# Patient Record
Sex: MALE | Race: WHITE | NOT HISPANIC OR LATINO | Employment: FULL TIME | URBAN - METROPOLITAN AREA
[De-identification: names, ages, dates, MRNs, and addresses within clinical notes are randomized per-mention and may not be internally consistent; named-entity substitution may affect disease eponyms.]

---

## 2018-11-12 ENCOUNTER — EMPLOYEE HEALTH (OUTPATIENT)
Dept: OCCUPATIONAL MEDICINE | Facility: CLINIC | Age: 28
End: 2018-11-12

## 2018-11-12 ENCOUNTER — EH NON-PROVIDER (OUTPATIENT)
Dept: OCCUPATIONAL MEDICINE | Facility: CLINIC | Age: 28
End: 2018-11-12

## 2018-11-12 ENCOUNTER — HOSPITAL ENCOUNTER (OUTPATIENT)
Facility: MEDICAL CENTER | Age: 28
End: 2018-11-12
Attending: PREVENTIVE MEDICINE
Payer: COMMERCIAL

## 2018-11-12 VITALS
BODY MASS INDEX: 35.93 KG/M2 | OXYGEN SATURATION: 96 % | TEMPERATURE: 98 F | SYSTOLIC BLOOD PRESSURE: 126 MMHG | HEIGHT: 70 IN | WEIGHT: 251 LBS | HEART RATE: 103 BPM | DIASTOLIC BLOOD PRESSURE: 72 MMHG

## 2018-11-12 DIAGNOSIS — Z02.1 PRE-EMPLOYMENT HEALTH SCREENING EXAMINATION: ICD-10-CM

## 2018-11-12 DIAGNOSIS — Z02.1 PRE-EMPLOYMENT DRUG SCREENING: ICD-10-CM

## 2018-11-12 LAB
AMP AMPHETAMINE: NORMAL
BAR BARBITURATES: NORMAL
BZO BENZODIAZEPINES: NORMAL
COC COCAINE: NORMAL
INT CON NEG: NORMAL
INT CON POS: NORMAL
MDMA ECSTASY: NORMAL
MET METHAMPHETAMINES: NORMAL
MTD METHADONE: NORMAL
OPI OPIATES: NORMAL
OXY OXYCODONE: NORMAL
PCP PHENCYCLIDINE: NORMAL
POC URINE DRUG SCREEN OCDRS: NORMAL
THC: NORMAL

## 2018-11-12 PROCEDURE — 8915 PR COMPREHENSIVE PHYSICAL: Performed by: PREVENTIVE MEDICINE

## 2018-11-12 PROCEDURE — 86480 TB TEST CELL IMMUN MEASURE: CPT | Performed by: PREVENTIVE MEDICINE

## 2018-11-12 PROCEDURE — 86787 VARICELLA-ZOSTER ANTIBODY: CPT | Performed by: PREVENTIVE MEDICINE

## 2018-11-12 PROCEDURE — 90686 IIV4 VACC NO PRSV 0.5 ML IM: CPT | Performed by: PREVENTIVE MEDICINE

## 2018-11-12 PROCEDURE — 94375 RESPIRATORY FLOW VOLUME LOOP: CPT | Performed by: PREVENTIVE MEDICINE

## 2018-11-12 PROCEDURE — 80305 DRUG TEST PRSMV DIR OPT OBS: CPT | Performed by: PREVENTIVE MEDICINE

## 2018-11-12 PROCEDURE — 90746 HEPB VACCINE 3 DOSE ADULT IM: CPT | Performed by: PREVENTIVE MEDICINE

## 2018-11-13 NOTE — PROGRESS NOTES
Patient's body mass index is 36.01 kg/m². Exercise and nutrition counseling were performed at this visit.

## 2018-11-14 LAB
M TB TUBERC IFN-G BLD QL: NEGATIVE
M TB TUBERC IFN-G/MITOGEN IGNF BLD: 0.01
M TB TUBERC IGNF/MITOGEN IGNF CONTROL: 50.49 [IU]/ML
MITOGEN IGNF BCKGRD COR BLD-ACNC: 0.02 [IU]/ML
VZV IGG SER IA-ACNC: 2.72

## 2018-11-19 ENCOUNTER — EH NON-PROVIDER (OUTPATIENT)
Dept: OCCUPATIONAL MEDICINE | Facility: CLINIC | Age: 28
End: 2018-11-19

## 2018-11-19 DIAGNOSIS — Z71.85 IMMUNIZATION COUNSELING: ICD-10-CM

## 2018-11-19 NOTE — PROGRESS NOTES
Pre-employment medical surveillance reviewed and signed. Quantiferon result documented in immunizations. Document returned to monthly assigned MA.

## 2019-09-24 ENCOUNTER — IMMUNIZATION (OUTPATIENT)
Dept: OCCUPATIONAL MEDICINE | Facility: CLINIC | Age: 29
End: 2019-09-24

## 2019-09-24 DIAGNOSIS — Z23 NEED FOR VACCINATION: ICD-10-CM

## 2019-09-30 ENCOUNTER — HOSPITAL ENCOUNTER (OUTPATIENT)
Dept: LAB | Facility: MEDICAL CENTER | Age: 29
End: 2019-09-30
Payer: COMMERCIAL

## 2019-09-30 LAB
BDY FAT % MEASURED: 32.5 %
BP DIAS: 81 MMHG
BP SYS: 128 MMHG
CHOLEST SERPL-MCNC: 153 MG/DL (ref 100–199)
DIABETES HTDIA: NO
EVENT NAME HTEVT: NORMAL
FASTING HTFAS: YES
GLUCOSE SERPL-MCNC: 92 MG/DL (ref 65–99)
HDLC SERPL-MCNC: 31 MG/DL
HYPERTENSION HTHYP: NO
LDLC SERPL CALC-MCNC: 82 MG/DL
SCREENING LOC CITY HTCIT: NORMAL
SCREENING LOC STATE HTSTA: NORMAL
SCREENING LOCATION HTLOC: NORMAL
SMOKING HTSMO: NO
SUBSCRIBER ID HTSID: NORMAL
TRIGL SERPL-MCNC: 202 MG/DL (ref 0–149)

## 2019-09-30 PROCEDURE — S5190 WELLNESS ASSESSMENT BY NONPH: HCPCS

## 2019-09-30 PROCEDURE — 80061 LIPID PANEL: CPT

## 2019-09-30 PROCEDURE — 36415 COLL VENOUS BLD VENIPUNCTURE: CPT

## 2019-09-30 PROCEDURE — 82947 ASSAY GLUCOSE BLOOD QUANT: CPT

## 2019-10-04 PROCEDURE — 90686 IIV4 VACC NO PRSV 0.5 ML IM: CPT | Performed by: PREVENTIVE MEDICINE

## 2019-11-29 ENCOUNTER — TELEPHONE (OUTPATIENT)
Dept: SCHEDULING | Facility: IMAGING CENTER | Age: 29
End: 2019-11-29

## 2020-03-19 ENCOUNTER — OFFICE VISIT (OUTPATIENT)
Dept: URGENT CARE | Facility: CLINIC | Age: 30
End: 2020-03-19
Payer: COMMERCIAL

## 2020-03-19 VITALS
RESPIRATION RATE: 18 BRPM | TEMPERATURE: 98.2 F | BODY MASS INDEX: 34.36 KG/M2 | SYSTOLIC BLOOD PRESSURE: 130 MMHG | HEART RATE: 110 BPM | WEIGHT: 240 LBS | OXYGEN SATURATION: 97 % | DIASTOLIC BLOOD PRESSURE: 80 MMHG | HEIGHT: 70 IN

## 2020-03-19 DIAGNOSIS — J22 LOWER RESPIRATORY INFECTION (E.G., BRONCHITIS, PNEUMONIA, PNEUMONITIS, PULMONITIS): ICD-10-CM

## 2020-03-19 DIAGNOSIS — J01.40 ACUTE NON-RECURRENT PANSINUSITIS: ICD-10-CM

## 2020-03-19 PROCEDURE — 99204 OFFICE O/P NEW MOD 45 MIN: CPT | Performed by: INTERNAL MEDICINE

## 2020-03-19 RX ORDER — ALBUTEROL SULFATE 90 UG/1
2 AEROSOL, METERED RESPIRATORY (INHALATION) EVERY 6 HOURS PRN
Qty: 8.5 G | Refills: 0 | Status: SHIPPED | OUTPATIENT
Start: 2020-03-19 | End: 2021-03-23

## 2020-03-19 RX ORDER — DOXYCYCLINE 100 MG/1
100 TABLET ORAL 2 TIMES DAILY
Qty: 14 TAB | Refills: 0 | Status: SHIPPED | OUTPATIENT
Start: 2020-03-19 | End: 2021-03-23

## 2020-03-19 ASSESSMENT — ENCOUNTER SYMPTOMS
RHINORRHEA: 1
SHORTNESS OF BREATH: 1
COUGH: 1
FEVER: 0
MYALGIAS: 0
WHEEZING: 0

## 2020-03-20 NOTE — PROGRESS NOTES
Subjective:     Chao Pereira Jr. is a 29 y.o. male who presents for Cough (and congestion for a week.  )       Cough   This is a new problem. The current episode started in the past 7 days. The problem has been gradually worsening. The problem occurs constantly. The cough is productive of purulent sputum. Associated symptoms include nasal congestion, postnasal drip, rhinorrhea and shortness of breath. Pertinent negatives include no fever, myalgias or wheezing.   History reviewed. No pertinent past medical history.History reviewed. No pertinent surgical history.  Social History     Socioeconomic History   • Marital status: Single     Spouse name: Not on file   • Number of children: Not on file   • Years of education: Not on file   • Highest education level: Not on file   Occupational History   • Not on file   Social Needs   • Financial resource strain: Not on file   • Food insecurity     Worry: Not on file     Inability: Not on file   • Transportation needs     Medical: Not on file     Non-medical: Not on file   Tobacco Use   • Smoking status: Never Smoker   • Smokeless tobacco: Never Used   Substance and Sexual Activity   • Alcohol use: Yes   • Drug use: No   • Sexual activity: Not on file   Lifestyle   • Physical activity     Days per week: Not on file     Minutes per session: Not on file   • Stress: Not on file   Relationships   • Social connections     Talks on phone: Not on file     Gets together: Not on file     Attends [employer] service: Not on file     Active member of club or organization: Not on file     Attends meetings of clubs or organizations: Not on file     Relationship status: Not on file   • Intimate partner violence     Fear of current or ex partner: Not on file     Emotionally abused: Not on file     Physically abused: Not on file     Forced sexual activity: Not on file   Other Topics Concern   • Not on file   Social History Narrative   • Not on file    History reviewed. No pertinent  "family history. Review of Systems   Constitutional: Negative for fever.   HENT: Positive for postnasal drip and rhinorrhea.    Respiratory: Positive for cough and shortness of breath. Negative for wheezing.    Musculoskeletal: Negative for myalgias.   All other systems reviewed and are negative.  No Known Allergies   Objective:   /80   Pulse (!) 110   Temp 36.8 °C (98.2 °F)   Ht 1.778 m (5' 10\")   Wt 108.9 kg (240 lb)   SpO2 97%   BMI 34.44 kg/m²   Physical Exam  Constitutional:       General: He is not in acute distress.     Appearance: He is well-developed.   HENT:      Head: Normocephalic and atraumatic.      Right Ear: Tympanic membrane, ear canal and external ear normal.      Left Ear: Tympanic membrane, ear canal and external ear normal.      Nose: Mucosal edema and rhinorrhea present.      Right Sinus: Maxillary sinus tenderness and frontal sinus tenderness present.      Left Sinus: Maxillary sinus tenderness and frontal sinus tenderness present.      Mouth/Throat:      Mouth: Mucous membranes are moist.      Pharynx: Oropharynx is clear. Posterior oropharyngeal erythema present.   Eyes:      Conjunctiva/sclera: Conjunctivae normal.   Neck:      Musculoskeletal: No neck rigidity.   Cardiovascular:      Rate and Rhythm: Normal rate and regular rhythm.   Pulmonary:      Effort: Pulmonary effort is normal. No respiratory distress.      Breath sounds: Normal breath sounds.   Lymphadenopathy:      Cervical: No cervical adenopathy.   Skin:     General: Skin is warm and dry.      Capillary Refill: Capillary refill takes less than 2 seconds.   Neurological:      Mental Status: He is alert and oriented to person, place, and time.      Sensory: No sensory deficit.      Deep Tendon Reflexes: Reflexes are normal and symmetric.   Psychiatric:         Mood and Affect: Mood normal.         Behavior: Behavior normal.           Assessment/Plan:   Assessment    1. Lower respiratory infection (e.g., bronchitis, " pneumonia, pneumonitis, pulmonitis)  - doxycycline monohydrate (ADOXA) 100 MG tablet; Take 1 Tab by mouth 2 times a day.  Dispense: 14 Tab; Refill: 0  - albuterol 108 (90 Base) MCG/ACT Aero Soln inhalation aerosol; Inhale 2 Puffs by mouth every 6 hours as needed for Shortness of Breath.  Dispense: 8.5 g; Refill: 0    2. Acute non-recurrent pansinusitis  - doxycycline monohydrate (ADOXA) 100 MG tablet; Take 1 Tab by mouth 2 times a day.  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

## 2020-04-06 ENCOUNTER — EH NON-PROVIDER (OUTPATIENT)
Dept: OCCUPATIONAL MEDICINE | Facility: CLINIC | Age: 30
End: 2020-04-06

## 2020-04-06 DIAGNOSIS — Z01.89 RESPIRATORY CLEARANCE EXAMINATION, ENCOUNTER FOR: ICD-10-CM

## 2020-04-06 PROCEDURE — 94375 RESPIRATORY FLOW VOLUME LOOP: CPT | Performed by: FAMILY MEDICINE

## 2020-05-11 ENCOUNTER — HOSPITAL ENCOUNTER (OUTPATIENT)
Dept: LAB | Facility: MEDICAL CENTER | Age: 30
End: 2020-05-11
Attending: NURSE PRACTITIONER
Payer: COMMERCIAL

## 2020-05-11 PROCEDURE — 86787 VARICELLA-ZOSTER ANTIBODY: CPT

## 2020-05-11 PROCEDURE — 36415 COLL VENOUS BLD VENIPUNCTURE: CPT

## 2020-05-11 PROCEDURE — 86480 TB TEST CELL IMMUN MEASURE: CPT

## 2020-05-13 LAB
GAMMA INTERFERON BACKGROUND BLD IA-ACNC: 0.02 IU/ML
M TB IFN-G BLD-IMP: NEGATIVE
M TB IFN-G CD4+ BCKGRND COR BLD-ACNC: 0 IU/ML
MITOGEN IGNF BCKGRD COR BLD-ACNC: >10 IU/ML
QFT TB2 - NIL TBQ2: 0 IU/ML
VZV IGG SER IA-ACNC: 2.54

## 2020-09-22 ENCOUNTER — IMMUNIZATION (OUTPATIENT)
Dept: OCCUPATIONAL MEDICINE | Facility: CLINIC | Age: 30
End: 2020-09-22

## 2020-09-22 DIAGNOSIS — Z23 NEED FOR VACCINATION: ICD-10-CM

## 2020-09-22 PROCEDURE — 90686 IIV4 VACC NO PRSV 0.5 ML IM: CPT | Performed by: PREVENTIVE MEDICINE

## 2020-11-25 ENCOUNTER — EH NON-PROVIDER (OUTPATIENT)
Dept: OCCUPATIONAL MEDICINE | Facility: CLINIC | Age: 30
End: 2020-11-25

## 2020-11-25 ENCOUNTER — HOSPITAL ENCOUNTER (OUTPATIENT)
Facility: MEDICAL CENTER | Age: 30
End: 2020-11-25
Attending: PREVENTIVE MEDICINE
Payer: COMMERCIAL

## 2020-11-25 DIAGNOSIS — Z11.59 ENCOUNTER FOR SCREENING FOR OTHER VIRAL DISEASES: ICD-10-CM

## 2020-11-25 LAB — COVID ORDER STATUS COVID19: NORMAL

## 2020-11-25 PROCEDURE — U0003 INFECTIOUS AGENT DETECTION BY NUCLEIC ACID (DNA OR RNA); SEVERE ACUTE RESPIRATORY SYNDROME CORONAVIRUS 2 (SARS-COV-2) (CORONAVIRUS DISEASE [COVID-19]), AMPLIFIED PROBE TECHNIQUE, MAKING USE OF HIGH THROUGHPUT TECHNOLOGIES AS DESCRIBED BY CMS-2020-01-R: HCPCS | Performed by: PREVENTIVE MEDICINE

## 2020-11-25 PROCEDURE — U0003 INFECTIOUS AGENT DETECTION BY NUCLEIC ACID (DNA OR RNA); SEVERE ACUTE RESPIRATORY SYNDROME CORONAVIRUS 2 (SARS-COV-2) (CORONAVIRUS DISEASE [COVID-19]), AMPLIFIED PROBE TECHNIQUE, MAKING USE OF HIGH THROUGHPUT TECHNOLOGIES AS DESCRIBED BY CMS-2020-01-R: HCPCS

## 2020-11-26 LAB
SARS-COV-2 RNA RESP QL NAA+PROBE: NOTDETECTED
SPECIMEN SOURCE: NORMAL

## 2020-12-17 DIAGNOSIS — Z23 NEED FOR VACCINATION: ICD-10-CM

## 2021-03-23 ENCOUNTER — OFFICE VISIT (OUTPATIENT)
Dept: MEDICAL GROUP | Facility: IMAGING CENTER | Age: 31
End: 2021-03-23
Payer: COMMERCIAL

## 2021-03-23 VITALS
HEIGHT: 70 IN | OXYGEN SATURATION: 96 % | DIASTOLIC BLOOD PRESSURE: 78 MMHG | WEIGHT: 272 LBS | RESPIRATION RATE: 20 BRPM | BODY MASS INDEX: 38.94 KG/M2 | SYSTOLIC BLOOD PRESSURE: 110 MMHG | HEART RATE: 108 BPM | TEMPERATURE: 99 F

## 2021-03-23 DIAGNOSIS — Q74.2 CONGENITAL FOOT ABNORMALITY: ICD-10-CM

## 2021-03-23 DIAGNOSIS — M62.830 BACK SPASM: ICD-10-CM

## 2021-03-23 DIAGNOSIS — D22.9 MULTIPLE ATYPICAL SKIN MOLES: ICD-10-CM

## 2021-03-23 DIAGNOSIS — E78.1 HYPERTRIGLYCERIDEMIA: ICD-10-CM

## 2021-03-23 DIAGNOSIS — Z88.9 H/O SEASONAL ALLERGIES: ICD-10-CM

## 2021-03-23 DIAGNOSIS — Z76.89 ENCOUNTER TO ESTABLISH CARE: ICD-10-CM

## 2021-03-23 PROCEDURE — 99214 OFFICE O/P EST MOD 30 MIN: CPT | Performed by: PHYSICIAN ASSISTANT

## 2021-03-23 RX ORDER — MELOXICAM 15 MG/1
TABLET ORAL
COMMUNITY
Start: 2021-03-15 | End: 2021-09-13

## 2021-03-23 RX ORDER — CYCLOBENZAPRINE HCL 5 MG
5-10 TABLET ORAL 3 TIMES DAILY PRN
Qty: 20 TABLET | Refills: 0 | Status: SHIPPED | OUTPATIENT
Start: 2021-03-23 | End: 2021-09-10

## 2021-03-23 ASSESSMENT — PATIENT HEALTH QUESTIONNAIRE - PHQ9: CLINICAL INTERPRETATION OF PHQ2 SCORE: 0

## 2021-03-23 ASSESSMENT — PAIN SCALES - GENERAL: PAINLEVEL: NO PAIN

## 2021-03-23 NOTE — ASSESSMENT & PLAN NOTE
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 He had a triple arthrodesis of his left foot in 2005, pt opted out of the second surgery on his right foot. He has been seeing a podiatrist who put him on Mobic which has been helping. He works on his feet all day and now gets pain into his knees and hips which makes it hard for him to sleep. He is going to get prescription insoles.

## 2021-03-23 NOTE — PATIENT INSTRUCTIONS
Once resulted, your lab/test/imaging results will show up automatically in your MyChart. Please wait for my interpretation and recommendations prior to viewing your results to avoid any unnecessary confusion or misinterpretation. I will address all of the lab values that I interpret as abnormal and message you accordingly on your MyChart. I will always send you a message on your labs even if they are normal. If you do not hear back from me within 3 business days after getting your labs drawn, then please send me a message on MyChart so I can obtain your labs (especially if you went to an outside lab - [facility], [facility], etc). If you have any additional questions or concerns beyond my interpretation of your results, please make an appointment with me to discuss in further detail.    Thank you,    Melody Vallejo PA-C

## 2021-03-23 NOTE — PROGRESS NOTES
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 male.     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 He had a triple arthrodesis of his left foot in 2005, pt opted out of the second surgery on his right foot. He has been seeing a podiatrist who put him on Mobic which has been helping. He works on his feet all day and now gets pain into his knees and hips which makes it hard for him to sleep. He is going to get prescription insoles.      Depression Screening    Little interest or pleasure in doing things?  0 - not at all  Feeling down, depressed , or hopeless? 0 - not at all  Patient Health Questionnaire Score: 0      Allergies:   Patient has no known allergies.  Current Outpatient Medications Ordered in Epic   Medication Sig Dispense Refill   • meloxicam (MOBIC) 15 MG tablet      • cyclobenzaprine (FLEXERIL) 5 mg tablet Take 1-2 Tablets by mouth 3 times a day as needed. 20 tablet 0   • albuterol (VENTOLIN OR PROVENTIL) 108 (90 BASE) MCG/ACT AERS inhalation aerosol Inhale 2 Puffs by mouth every 6 hours as needed for Shortness of Breath. 8.5 g 3     No current [facility]-ordered facility-administered medications on file.     Past Medical History:   Diagnosis Date   • Allergy      Past Surgical History:   Procedure Laterality Date   • OTHER  2005    triple arthrodesis   • VASECTOMY       Social History     Tobacco Use   • Smoking status: Never Smoker   • Smokeless tobacco: Never Used   Substance Use Topics   • Alcohol use: Yes     Comment: occasional - 1x a month   • Drug use: No     Social History     Social History Narrative   • Not on file     Family History   Problem Relation Age of Onset   • Hypertension Father    • Cancer Neg Hx    • Diabetes Neg Hx    • Heart " "Disease Neg Hx    • Stroke Neg Hx        Health Maintenance/Screening  --Diet: keto, limited red meat  --Exercise: walks a lot at work    --STI Screening: refused  --HIV Screening: refused  --Immunizations:    Influenza: UTD   Tetanus: due 2026      ROS:   Gen: no fevers/chills, no changes in weight  Eyes: no changes in vision  ENT: no sore throat, no hearing loss, no bloody nose  Pulm: no sob, no cough  CV: no chest pain, no palpitations  GI: no nausea/vomiting, no diarrhea  : no dysuria or hematuria  MSk: no myalgias  Skin: no rash  Neuro: no headaches, no numbness/tingling  Heme/Lymph: no easy bruising      Objective:     Exam: /78 (BP Location: Right arm, Patient Position: Sitting, BP Cuff Size: Adult)   Pulse (!) 108   Temp 37.2 °C (99 °F) (Temporal)   Resp 20   Ht 1.778 m (5' 10\")   Wt 123 kg (272 lb)   SpO2 96%  Body mass index is 39.03 kg/m².    General: Normal appearing. No distress.  HEENT: Normocephalic. Eyes conjunctiva clear lids without ptosis, pupils equal  Neck: Supple without JVD. Thyroid is not enlarged.  Pulmonary: Clear to ausculation.  Normal effort. No rales, ronchi, or wheezing.  Cardiovascular: Regular rate and rhythm without murmur. Carotid and radial pulses are intact and equal bilaterally.  Abdomen: Soft, nontender, nondistended. Normal bowel sounds. Liver and spleen are not palpable  Neurologic: Grossly nonfocal  Lymph: No cervical or supraclavicular lymph nodes are palpable  Skin: Warm and dry.  No obvious lesions.  Musculoskeletal: Normal gait. No extremity cyanosis, clubbing, or edema. Outward rotation of feet B/L. Limited ROM, but able to plantar and dorsiflex.  Psych: Normal mood and affect. Alert and oriented x3. Judgment and insight is normal.    Assessment & Plan:   30 y.o. male with the following -    1. Encounter to establish care  Pleasant 30-year-old male here to establish care.  Lab orders reviewed.  Vaccinations discussed.  Diet and exercise discussed.    2. " Congenital foot abnormality  Patient with chronic outward rotation of bilateral feet with history of left foot triple arthrodesis.  Will refer to orthopedics for their evaluation.  Would recommend updated imaging of hips and knees.  Will trial Flexeril for his back spasms related to his gait.  - REFERRAL TO ORTHOPEDICS    3. Back spasm  - cyclobenzaprine (FLEXERIL) 5 mg tablet; Take 1-2 Tablets by mouth 3 times a day as needed.  Dispense: 20 tablet; Refill: 0    4. H/O seasonal allergies  - CBC WITH DIFFERENTIAL; Future  - Comp Metabolic Panel; Future    5. Hypertriglyceridemia  - CBC WITH DIFFERENTIAL; Future  - Comp Metabolic Panel; Future  - Lipid Profile; Future    6. BMI 39.0-39.9,adult  Nutritionist evaluation next visit if no change in weight.  - CBC WITH DIFFERENTIAL; Future  - Comp Metabolic Panel; Future  - Lipid Profile; Future    7. Multiple atypical skin moles  - REFERRAL TO DERMATOLOGY    Return in about 3 months (around 6/23/2021) for Follow-up.    Melody Kemp PA-C    Please note that this dictation was created using voice recognition software. I have made every reasonable attempt to correct obvious errors, but I expect that there are errors of grammar and possibly content that I did not discover before finalizing the note.

## 2021-03-25 ENCOUNTER — TELEPHONE (OUTPATIENT)
Dept: MEDICAL GROUP | Facility: IMAGING CENTER | Age: 31
End: 2021-03-25

## 2021-03-25 NOTE — TELEPHONE ENCOUNTER
----- Message from Melody Kemp P.A.-C. sent at 3/23/2021  2:31 PM PDT -----  Last EMG and records from [facility]

## 2021-03-25 NOTE — TELEPHONE ENCOUNTER
----- Message from Melody Kemp P.A.-C. sent at 3/23/2021  2:31 PM PDT -----  Records and imaging from Dr. Zhoa through [facility]

## 2021-06-25 ENCOUNTER — HOSPITAL ENCOUNTER (OUTPATIENT)
Dept: RADIOLOGY | Facility: MEDICAL CENTER | Age: 31
End: 2021-06-25
Attending: ORTHOPAEDIC SURGERY
Payer: COMMERCIAL

## 2021-06-25 DIAGNOSIS — M19.071 PRIMARY LOCALIZED OSTEOARTHROSIS OF RIGHT ANKLE AND FOOT: ICD-10-CM

## 2021-06-25 PROCEDURE — 73700 CT LOWER EXTREMITY W/O DYE: CPT | Mod: RT

## 2021-09-07 ENCOUNTER — EH NON-PROVIDER (OUTPATIENT)
Dept: OCCUPATIONAL MEDICINE | Facility: CLINIC | Age: 31
End: 2021-09-07

## 2021-09-07 ENCOUNTER — HOSPITAL ENCOUNTER (OUTPATIENT)
Facility: MEDICAL CENTER | Age: 31
End: 2021-09-07
Attending: NURSE PRACTITIONER
Payer: COMMERCIAL

## 2021-09-07 DIAGNOSIS — Z11.59 ENCOUNTER FOR SCREENING FOR OTHER VIRAL DISEASES: Primary | ICD-10-CM

## 2021-09-07 DIAGNOSIS — Z11.59 ENCOUNTER FOR SCREENING FOR OTHER VIRAL DISEASES: ICD-10-CM

## 2021-09-07 LAB
COVID ORDER STATUS COVID19: NORMAL
SARS-COV-2 RNA RESP QL NAA+PROBE: DETECTED
SPECIMEN SOURCE: ABNORMAL

## 2021-09-07 PROCEDURE — U0003 INFECTIOUS AGENT DETECTION BY NUCLEIC ACID (DNA OR RNA); SEVERE ACUTE RESPIRATORY SYNDROME CORONAVIRUS 2 (SARS-COV-2) (CORONAVIRUS DISEASE [COVID-19]), AMPLIFIED PROBE TECHNIQUE, MAKING USE OF HIGH THROUGHPUT TECHNOLOGIES AS DESCRIBED BY CMS-2020-01-R: HCPCS | Performed by: NURSE PRACTITIONER

## 2021-09-09 NOTE — PATIENT COMMUNICATION
Patient called COVID hotline back.  Notified pt. of POSITIVE COVID-19 test. Pt will be excluded from work for 10 days from their onset of symptoms. They have been instructed to isolate at home during this time.           Projected release date is 9/14, to return to work on their next scheduled shift.

## 2021-09-09 NOTE — PATIENT COMMUNICATION
Attempted to contact patient with result of POSITIVE COVID-19 test.  Non-detailed message was left without using any patient identifiers, asking for a call back at [phone].     Pt will be excluded from work for 10 days from their onset of symptoms. They should be isolating at home during this time.      Projected release date is 9/14, to return to work on their next scheduled shift.

## 2021-09-10 ENCOUNTER — TELEMEDICINE (OUTPATIENT)
Dept: MEDICAL GROUP | Facility: IMAGING CENTER | Age: 31
End: 2021-09-10
Payer: COMMERCIAL

## 2021-09-10 VITALS — BODY MASS INDEX: 37.45 KG/M2 | WEIGHT: 261 LBS

## 2021-09-10 DIAGNOSIS — U07.1 COVID-19: ICD-10-CM

## 2021-09-10 DIAGNOSIS — R05.9 COUGH: ICD-10-CM

## 2021-09-10 DIAGNOSIS — M79.10 MUSCLE ACHE: ICD-10-CM

## 2021-09-10 PROCEDURE — 99214 OFFICE O/P EST MOD 30 MIN: CPT | Mod: 95,CR,CS | Performed by: PHYSICIAN ASSISTANT

## 2021-09-10 RX ORDER — ALBUTEROL SULFATE 90 UG/1
2 AEROSOL, METERED RESPIRATORY (INHALATION) EVERY 6 HOURS PRN
Qty: 8.5 G | Refills: 1 | Status: SHIPPED
Start: 2021-09-10 | End: 2021-12-01

## 2021-09-10 RX ORDER — CYCLOBENZAPRINE HCL 10 MG
10 TABLET ORAL 2 TIMES DAILY PRN
Qty: 30 TABLET | Refills: 0 | Status: SHIPPED
Start: 2021-09-10 | End: 2021-12-03

## 2021-09-10 RX ORDER — IBUPROFEN 800 MG/1
TABLET ORAL
COMMUNITY
Start: 2021-08-25 | End: 2021-09-13

## 2021-09-10 NOTE — PATIENT INSTRUCTIONS
My recommendations for an upper respiratory infection:  - Use a humidifier, especially at night. Cold or warm water humidifiers have the same effect.  - Sarmad Med squeeze bottle sinus rinses or plain nasal saline twice a day.  - Hot tea + honey + fresh lemon juice  - Honey by itself has been shown to help provide cough relief  - Frequent hand washing, rest, hydration  - OTC meds as recommended today during your visit - Mucinex DM

## 2021-09-10 NOTE — PROGRESS NOTES
Virtual Visit: Established Patient   This visit was conducted via Zoom using secure and encrypted videoconferencing technology. The patient was in a private location in the state of [place].    The patient's identity was confirmed and verbal consent was obtained for this virtual visit.    Subjective:   CC:   Chief Complaint   Patient presents with   • Medication Refill     meloxicam never recieved. Refills on albuterol and cyclobenzaprine   • Covid-19 Home Monitoring Video Visit       Chao Pereira Jr. is a 30 y.o. male presenting for evaluation and management of:    COVID-19  Pt states that around one week ago he noticed an elevated heart rate. He started to develop no appetite, no taste or smell. He feels tired and achy. No significant dizziness. Mild cough and chest congestion. He still notices that his heart rate increases to the 140s when he walks. Improves with rest.    Has been using Mucinex D bid x 7 days. Taking Tylenol and ibuprofen as needed. Flexeril helping with the body aches, but he is out. Taking Zinc, vitamin d, vitamin b. Pt was not vaccinated.    Oxygen saturation mostly above 90% when he checks it at home.       ROS   Denies any recent fevers or chills. No nausea or vomiting. No chest pains or shortness of breath.     No Known Allergies    Current medicines (including changes today)  Current Outpatient Medications   Medication Sig Dispense Refill   • albuterol 108 (90 Base) MCG/ACT Aero Soln inhalation aerosol Inhale 2 Puffs every 6 hours as needed for Shortness of Breath. 8.5 g 1   • cyclobenzaprine (FLEXERIL) 10 mg Tab Take 1 Tablet by mouth 2 times a day as needed for Muscle Spasms. 30 Tablet 0   • ibuprofen (MOTRIN) 800 MG Tab      • meloxicam (MOBIC) 15 MG tablet  (Patient not taking: Reported on 9/10/2021)       No current facility-administered medications for this visit.       Patient Active Problem List    Diagnosis Date Noted   • COVID-19 09/10/2021   • H/O seasonal allergies  03/23/2021   • Hypertriglyceridemia 03/23/2021   • BMI 39.0-39.9,adult 03/23/2021   • Congenital foot abnormality 03/23/2021       Family History   Problem Relation Age of Onset   • Hypertension Father    • Cancer Neg Hx    • Diabetes Neg Hx    • Heart Disease Neg Hx    • Stroke Neg Hx        He  has a past medical history of Allergy.  He  has a past surgical history that includes other (2005) and vasectomy.         Objective:   Wt 118 kg (261 lb)   BMI 37.45 kg/m²   RR 12    Physical Exam:  Constitutional: Alert, no distress, well-groomed.  Skin: No rashes in visible areas.  Eye: Round. Conjunctiva clear, lids normal. No icterus.   ENMT: Lips pink without lesions, good dentition, moist mucous membranes. Phonation normal.  Neck: No masses, no thyromegaly. Moves freely without pain.  Respiratory: Unlabored respiratory effort, no cough or audible wheeze  Psych: Alert and oriented x3, normal affect and mood.     Assessment and Plan:   The following treatment plan was discussed:     1. COVID-19  Day 7. Change Mucinex D to Mucinex DM as it may contributing to his tachycardia. Albuterol 1-2 puffs every 4-6 hours as needed.  Encouraged Covid vaccination once feeling better.  Increase fluids and rest.  Antihistamines and Flonase for nasal symptoms.  Frequent handwashing.  Covid long-haul symptoms discussed. Cardiac work up if tachycardia persists.     2. Cough  See above  - albuterol 108 (90 Base) MCG/ACT Aero Soln inhalation aerosol; Inhale 2 Puffs every 6 hours as needed for Shortness of Breath.  Dispense: 8.5 g; Refill: 1    3. Muscle ache  Refill Flexeril.  - cyclobenzaprine (FLEXERIL) 10 mg Tab; Take 1 Tablet by mouth 2 times a day as needed for Muscle Spasms.  Dispense: 30 Tablet; Refill: 0    Follow-up: Return in about 4 weeks (around 10/8/2021) for Follow-up - pre-op respiratory check.         Meoldy Zuniga PA-C ([place])  Physician Assistant Certified  [facility]    Please note that this dictation was  created using voice recognition software. I have made every reasonable attempt to correct obvious errors, but I expect that there are errors of grammar and possibly content that I did not discover before finalizing the note.

## 2021-09-10 NOTE — ASSESSMENT & PLAN NOTE
Pt states that around one week ago he noticed an elevated heart rate. He started to develop no appetite, no taste or smell. He feels tired and achy. No significant dizziness. Mild cough and chest congestion. He still notices that his heart rate increases to the 140s when he walks. Improves with rest.    Has been using Mucinex D bid x 7 days. Taking Tylenol and ibuprofen as needed. Flexeril helping with the body aches, but he is out. Taking Zinc, vitamin d, vitamin b. Pt was not vaccinated.    Oxygen saturation mostly above 90% when he checks it at home.

## 2021-09-13 ENCOUNTER — HOSPITAL ENCOUNTER (INPATIENT)
Facility: MEDICAL CENTER | Age: 31
LOS: 7 days | DRG: 871 | End: 2021-09-20
Attending: EMERGENCY MEDICINE | Admitting: STUDENT IN AN ORGANIZED HEALTH CARE EDUCATION/TRAINING PROGRAM
Payer: COMMERCIAL

## 2021-09-13 ENCOUNTER — APPOINTMENT (OUTPATIENT)
Dept: RADIOLOGY | Facility: MEDICAL CENTER | Age: 31
DRG: 871 | End: 2021-09-13
Attending: EMERGENCY MEDICINE
Payer: COMMERCIAL

## 2021-09-13 DIAGNOSIS — J96.00 ACUTE RESPIRATORY FAILURE DUE TO COVID-19 (HCC): ICD-10-CM

## 2021-09-13 DIAGNOSIS — J96.01 ACUTE HYPOXEMIC RESPIRATORY FAILURE DUE TO COVID-19 (HCC): ICD-10-CM

## 2021-09-13 DIAGNOSIS — R74.01 TRANSAMINITIS: ICD-10-CM

## 2021-09-13 DIAGNOSIS — U07.1 ACUTE HYPOXEMIC RESPIRATORY FAILURE DUE TO COVID-19 (HCC): ICD-10-CM

## 2021-09-13 DIAGNOSIS — U07.1 ACUTE RESPIRATORY FAILURE DUE TO COVID-19 (HCC): ICD-10-CM

## 2021-09-13 PROBLEM — R00.0 TACHYCARDIA WITH HEART RATE 141-160 BEATS PER MINUTE: Status: ACTIVE | Noted: 2021-09-13

## 2021-09-13 LAB
ALBUMIN SERPL BCP-MCNC: 4.2 G/DL (ref 3.2–4.9)
ALBUMIN/GLOB SERPL: 1.1 G/DL
ALP SERPL-CCNC: 126 U/L (ref 30–99)
ALT SERPL-CCNC: 112 U/L (ref 2–50)
ANION GAP SERPL CALC-SCNC: 19 MMOL/L (ref 7–16)
APPEARANCE UR: CLEAR
AST SERPL-CCNC: 127 U/L (ref 12–45)
BACTERIA #/AREA URNS HPF: ABNORMAL /HPF
BASOPHILS # BLD AUTO: 0.1 % (ref 0–1.8)
BASOPHILS # BLD: 0.01 K/UL (ref 0–0.12)
BILIRUB SERPL-MCNC: 0.7 MG/DL (ref 0.1–1.5)
BILIRUB UR QL STRIP.AUTO: NEGATIVE
BUN SERPL-MCNC: 21 MG/DL (ref 8–22)
CALCIUM SERPL-MCNC: 8.9 MG/DL (ref 8.5–10.5)
CHLORIDE SERPL-SCNC: 94 MMOL/L (ref 96–112)
CO2 SERPL-SCNC: 21 MMOL/L (ref 20–33)
COLOR UR: ABNORMAL
CREAT SERPL-MCNC: 1.13 MG/DL (ref 0.5–1.4)
CRP SERPL HS-MCNC: 8.46 MG/DL (ref 0–0.75)
D DIMER PPP IA.FEU-MCNC: 0.75 UG/ML (FEU) (ref 0–0.5)
EKG IMPRESSION: NORMAL
EOSINOPHIL # BLD AUTO: 0 K/UL (ref 0–0.51)
EOSINOPHIL NFR BLD: 0 % (ref 0–6.9)
EPI CELLS #/AREA URNS HPF: ABNORMAL /HPF
ERYTHROCYTE [DISTWIDTH] IN BLOOD BY AUTOMATED COUNT: 41.6 FL (ref 35.9–50)
EST. AVERAGE GLUCOSE BLD GHB EST-MCNC: 140 MG/DL
GLOBULIN SER CALC-MCNC: 3.8 G/DL (ref 1.9–3.5)
GLUCOSE SERPL-MCNC: 120 MG/DL (ref 65–99)
GLUCOSE UR STRIP.AUTO-MCNC: NEGATIVE MG/DL
HBA1C MFR BLD: 6.5 % (ref 4–5.6)
HCT VFR BLD AUTO: 55.7 % (ref 42–52)
HGB BLD-MCNC: 18.6 G/DL (ref 14–18)
HYALINE CASTS #/AREA URNS LPF: ABNORMAL /LPF
IMM GRANULOCYTES # BLD AUTO: 0.05 K/UL (ref 0–0.11)
IMM GRANULOCYTES NFR BLD AUTO: 0.7 % (ref 0–0.9)
KETONES UR STRIP.AUTO-MCNC: ABNORMAL MG/DL
LACTATE BLD-SCNC: 2.6 MMOL/L (ref 0.5–2)
LACTATE BLD-SCNC: 2.8 MMOL/L (ref 0.5–2)
LACTATE BLD-SCNC: 3.2 MMOL/L (ref 0.5–2)
LEUKOCYTE ESTERASE UR QL STRIP.AUTO: NEGATIVE
LYMPHOCYTES # BLD AUTO: 0.59 K/UL (ref 1–4.8)
LYMPHOCYTES NFR BLD: 8.7 % (ref 22–41)
MAGNESIUM SERPL-MCNC: 2.2 MG/DL (ref 1.5–2.5)
MCH RBC QN AUTO: 25.8 PG (ref 27–33)
MCHC RBC AUTO-ENTMCNC: 33.4 G/DL (ref 33.7–35.3)
MCV RBC AUTO: 77.4 FL (ref 81.4–97.8)
MICRO URNS: ABNORMAL
MONOCYTES # BLD AUTO: 0.24 K/UL (ref 0–0.85)
MONOCYTES NFR BLD AUTO: 3.5 % (ref 0–13.4)
MUCOUS THREADS #/AREA URNS HPF: ABNORMAL /HPF
NEUTROPHILS # BLD AUTO: 5.9 K/UL (ref 1.82–7.42)
NEUTROPHILS NFR BLD: 87 % (ref 44–72)
NITRITE UR QL STRIP.AUTO: NEGATIVE
NRBC # BLD AUTO: 0 K/UL
NRBC BLD-RTO: 0 /100 WBC
PH UR STRIP.AUTO: 5.5 [PH] (ref 5–8)
PLATELET # BLD AUTO: 280 K/UL (ref 164–446)
PMV BLD AUTO: 10.5 FL (ref 9–12.9)
POTASSIUM SERPL-SCNC: 3.7 MMOL/L (ref 3.6–5.5)
PROCALCITONIN SERPL-MCNC: 0.53 NG/ML
PROT SERPL-MCNC: 8 G/DL (ref 6–8.2)
PROT UR QL STRIP: 100 MG/DL
RBC # BLD AUTO: 7.2 M/UL (ref 4.7–6.1)
RBC UR QL AUTO: NEGATIVE
SODIUM SERPL-SCNC: 134 MMOL/L (ref 135–145)
SP GR UR STRIP.AUTO: 1.03
UROBILINOGEN UR STRIP.AUTO-MCNC: 1 MG/DL
WBC # BLD AUTO: 6.8 K/UL (ref 4.8–10.8)
WBC #/AREA URNS HPF: ABNORMAL /HPF

## 2021-09-13 PROCEDURE — 81001 URINALYSIS AUTO W/SCOPE: CPT

## 2021-09-13 PROCEDURE — 700102 HCHG RX REV CODE 250 W/ 637 OVERRIDE(OP): Performed by: STUDENT IN AN ORGANIZED HEALTH CARE EDUCATION/TRAINING PROGRAM

## 2021-09-13 PROCEDURE — 83036 HEMOGLOBIN GLYCOSYLATED A1C: CPT

## 2021-09-13 PROCEDURE — 99285 EMERGENCY DEPT VISIT HI MDM: CPT

## 2021-09-13 PROCEDURE — 71045 X-RAY EXAM CHEST 1 VIEW: CPT

## 2021-09-13 PROCEDURE — A9270 NON-COVERED ITEM OR SERVICE: HCPCS | Performed by: STUDENT IN AN ORGANIZED HEALTH CARE EDUCATION/TRAINING PROGRAM

## 2021-09-13 PROCEDURE — 84145 PROCALCITONIN (PCT): CPT

## 2021-09-13 PROCEDURE — 96372 THER/PROPH/DIAG INJ SC/IM: CPT

## 2021-09-13 PROCEDURE — 96375 TX/PRO/DX INJ NEW DRUG ADDON: CPT

## 2021-09-13 PROCEDURE — 93005 ELECTROCARDIOGRAM TRACING: CPT

## 2021-09-13 PROCEDURE — 85025 COMPLETE CBC W/AUTO DIFF WBC: CPT

## 2021-09-13 PROCEDURE — 86140 C-REACTIVE PROTEIN: CPT

## 2021-09-13 PROCEDURE — 87040 BLOOD CULTURE FOR BACTERIA: CPT

## 2021-09-13 PROCEDURE — 770020 HCHG ROOM/CARE - TELE (206)

## 2021-09-13 PROCEDURE — 85379 FIBRIN DEGRADATION QUANT: CPT

## 2021-09-13 PROCEDURE — 80053 COMPREHEN METABOLIC PANEL: CPT

## 2021-09-13 PROCEDURE — 96374 THER/PROPH/DIAG INJ IV PUSH: CPT

## 2021-09-13 PROCEDURE — 83605 ASSAY OF LACTIC ACID: CPT

## 2021-09-13 PROCEDURE — 87086 URINE CULTURE/COLONY COUNT: CPT

## 2021-09-13 PROCEDURE — 83735 ASSAY OF MAGNESIUM: CPT

## 2021-09-13 PROCEDURE — 93005 ELECTROCARDIOGRAM TRACING: CPT | Performed by: EMERGENCY MEDICINE

## 2021-09-13 PROCEDURE — 700111 HCHG RX REV CODE 636 W/ 250 OVERRIDE (IP): Performed by: STUDENT IN AN ORGANIZED HEALTH CARE EDUCATION/TRAINING PROGRAM

## 2021-09-13 PROCEDURE — 700101 HCHG RX REV CODE 250: Performed by: STUDENT IN AN ORGANIZED HEALTH CARE EDUCATION/TRAINING PROGRAM

## 2021-09-13 PROCEDURE — 99223 1ST HOSP IP/OBS HIGH 75: CPT | Performed by: STUDENT IN AN ORGANIZED HEALTH CARE EDUCATION/TRAINING PROGRAM

## 2021-09-13 RX ORDER — CLONIDINE HYDROCHLORIDE 0.1 MG/1
0.1 TABLET ORAL EVERY 6 HOURS PRN
Status: DISCONTINUED | OUTPATIENT
Start: 2021-09-13 | End: 2021-09-20 | Stop reason: HOSPADM

## 2021-09-13 RX ORDER — ONDANSETRON 2 MG/ML
4 INJECTION INTRAMUSCULAR; INTRAVENOUS EVERY 4 HOURS PRN
Status: DISCONTINUED | OUTPATIENT
Start: 2021-09-13 | End: 2021-09-13

## 2021-09-13 RX ORDER — GUAIFENESIN/DEXTROMETHORPHAN 100-10MG/5
10 SYRUP ORAL EVERY 6 HOURS PRN
Status: DISCONTINUED | OUTPATIENT
Start: 2021-09-13 | End: 2021-09-20 | Stop reason: HOSPADM

## 2021-09-13 RX ORDER — GUAIFENESIN 600 MG/1
600 TABLET, EXTENDED RELEASE ORAL 2 TIMES DAILY PRN
Status: DISCONTINUED | OUTPATIENT
Start: 2021-09-13 | End: 2021-09-13

## 2021-09-13 RX ORDER — DEXAMETHASONE 4 MG/1
6 TABLET ORAL DAILY
Status: DISCONTINUED | OUTPATIENT
Start: 2021-09-13 | End: 2021-09-20 | Stop reason: HOSPADM

## 2021-09-13 RX ORDER — ACETAMINOPHEN 325 MG/1
650 TABLET ORAL EVERY 6 HOURS PRN
Status: DISCONTINUED | OUTPATIENT
Start: 2021-09-13 | End: 2021-09-20 | Stop reason: HOSPADM

## 2021-09-13 RX ORDER — B-COMPLEX WITH VITAMIN C
1 TABLET ORAL DAILY
COMMUNITY
End: 2021-10-18

## 2021-09-13 RX ORDER — VITAMIN B COMPLEX
1000 TABLET ORAL DAILY
COMMUNITY
End: 2021-12-01

## 2021-09-13 RX ORDER — BISACODYL 10 MG
10 SUPPOSITORY, RECTAL RECTAL
Status: DISCONTINUED | OUTPATIENT
Start: 2021-09-13 | End: 2021-09-20 | Stop reason: HOSPADM

## 2021-09-13 RX ORDER — ENALAPRILAT 1.25 MG/ML
1.25 INJECTION INTRAVENOUS EVERY 6 HOURS PRN
Status: DISCONTINUED | OUTPATIENT
Start: 2021-09-13 | End: 2021-09-20 | Stop reason: HOSPADM

## 2021-09-13 RX ORDER — PROCHLORPERAZINE EDISYLATE 5 MG/ML
5-10 INJECTION INTRAMUSCULAR; INTRAVENOUS EVERY 4 HOURS PRN
Status: DISCONTINUED | OUTPATIENT
Start: 2021-09-13 | End: 2021-09-20 | Stop reason: HOSPADM

## 2021-09-13 RX ORDER — ONDANSETRON 4 MG/1
4 TABLET, ORALLY DISINTEGRATING ORAL EVERY 6 HOURS PRN
Status: DISCONTINUED | OUTPATIENT
Start: 2021-09-13 | End: 2021-09-20 | Stop reason: HOSPADM

## 2021-09-13 RX ORDER — ONDANSETRON 4 MG/1
4 TABLET, ORALLY DISINTEGRATING ORAL EVERY 4 HOURS PRN
Status: DISCONTINUED | OUTPATIENT
Start: 2021-09-13 | End: 2021-09-13

## 2021-09-13 RX ORDER — LABETALOL HYDROCHLORIDE 5 MG/ML
10 INJECTION, SOLUTION INTRAVENOUS EVERY 4 HOURS PRN
Status: DISCONTINUED | OUTPATIENT
Start: 2021-09-13 | End: 2021-09-20 | Stop reason: HOSPADM

## 2021-09-13 RX ORDER — LORAZEPAM 2 MG/ML
2 INJECTION INTRAMUSCULAR ONCE
Status: COMPLETED | OUTPATIENT
Start: 2021-09-13 | End: 2021-09-13

## 2021-09-13 RX ORDER — METOPROLOL TARTRATE 1 MG/ML
5 INJECTION, SOLUTION INTRAVENOUS
Status: COMPLETED | OUTPATIENT
Start: 2021-09-13 | End: 2021-09-14

## 2021-09-13 RX ORDER — POLYETHYLENE GLYCOL 3350 17 G/17G
1 POWDER, FOR SOLUTION ORAL
Status: DISCONTINUED | OUTPATIENT
Start: 2021-09-13 | End: 2021-09-20 | Stop reason: HOSPADM

## 2021-09-13 RX ORDER — PROMETHAZINE HYDROCHLORIDE 25 MG/1
12.5-25 SUPPOSITORY RECTAL EVERY 4 HOURS PRN
Status: DISCONTINUED | OUTPATIENT
Start: 2021-09-13 | End: 2021-09-20 | Stop reason: HOSPADM

## 2021-09-13 RX ORDER — ONDANSETRON 2 MG/ML
4 INJECTION INTRAMUSCULAR; INTRAVENOUS EVERY 6 HOURS PRN
Status: DISCONTINUED | OUTPATIENT
Start: 2021-09-13 | End: 2021-09-20 | Stop reason: HOSPADM

## 2021-09-13 RX ORDER — AMOXICILLIN 250 MG
2 CAPSULE ORAL 2 TIMES DAILY
Status: DISCONTINUED | OUTPATIENT
Start: 2021-09-13 | End: 2021-09-20 | Stop reason: HOSPADM

## 2021-09-13 RX ORDER — GINSENG 100 MG
50 CAPSULE ORAL DAILY
COMMUNITY
End: 2021-12-01

## 2021-09-13 RX ORDER — LORAZEPAM 2 MG/ML
2 INJECTION INTRAMUSCULAR EVERY 4 HOURS PRN
Status: DISCONTINUED | OUTPATIENT
Start: 2021-09-13 | End: 2021-09-20 | Stop reason: HOSPADM

## 2021-09-13 RX ORDER — PROMETHAZINE HYDROCHLORIDE 25 MG/1
12.5-25 TABLET ORAL EVERY 4 HOURS PRN
Status: DISCONTINUED | OUTPATIENT
Start: 2021-09-13 | End: 2021-09-20 | Stop reason: HOSPADM

## 2021-09-13 RX ADMIN — METOPROLOL TARTRATE 5 MG: 1 INJECTION, SOLUTION INTRAVENOUS at 20:35

## 2021-09-13 RX ADMIN — LORAZEPAM 2 MG: 2 INJECTION INTRAMUSCULAR; INTRAVENOUS at 18:31

## 2021-09-13 RX ADMIN — ENOXAPARIN SODIUM 40 MG: 40 INJECTION SUBCUTANEOUS at 18:31

## 2021-09-13 RX ADMIN — METOPROLOL TARTRATE 5 MG: 1 INJECTION, SOLUTION INTRAVENOUS at 19:05

## 2021-09-13 RX ADMIN — ACETAMINOPHEN 650 MG: 325 TABLET, FILM COATED ORAL at 20:36

## 2021-09-13 RX ADMIN — DEXAMETHASONE 6 MG: 4 TABLET ORAL at 18:31

## 2021-09-13 ASSESSMENT — LIFESTYLE VARIABLES: DO YOU DRINK ALCOHOL: NO

## 2021-09-13 ASSESSMENT — ENCOUNTER SYMPTOMS
MYALGIAS: 1
SHORTNESS OF BREATH: 1
DIARRHEA: 1

## 2021-09-13 NOTE — ED PROVIDER NOTES
ED Provider Note    CHIEF COMPLAINT  Chief Complaint   Patient presents with   • Shortness of Breath     diagnosed with COVID on 9/7/21, symptoms started on 9/3/21. Patient reports he's had fever, chills and diarrhea.        HPI  Chao Pereira Jr. is a 30 y.o. male who presents for evaluation of progressive shortness of breath after being diagnosed with COVID-19.  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  He states that this is the 10th day of symptoms, the first week or so he felt okay but since then has had progressive shortness of breath and tachycardia at home which he treated with his wife's metoprolol.  He reports fever and chills and diarrhea.  Chest tightness as well.  Denies any other medical problems.     REVIEW OF SYSTEMS  Negative for rash, abdominal pain, focal weakness, focal numbness, focal tingling, back pain. All other systems are negative.     PAST MEDICAL HISTORY  Past Medical History:   Diagnosis Date   • Allergy        FAMILY HISTORY  Family History   Problem Relation Age of Onset   • Hypertension Father    • Cancer Neg Hx    • Diabetes Neg Hx    • Heart Disease Neg Hx    • Stroke Neg Hx        SOCIAL HISTORY  Social History     Tobacco Use   • Smoking status: Never Smoker   • Smokeless tobacco: Never Used   Vaping Use   • Vaping Use: Never used   Substance Use Topics   • Alcohol use: Yes     Comment: occasional - 1x a month   • Drug use: No       SURGICAL HISTORY  Past Surgical History:   Procedure Laterality Date   • OTHER 2005    triple arthrodesis   • VASECTOMY         CURRENT MEDICATIONS  I personally reviewed the medication list in the charting documentation.     ALLERGIES  No Known Allergies    MEDICAL RECORD  I have reviewed patient's medical record and pertinent results are listed above.      PHYSICAL EXAM  VITAL SIGNS: /79   Pulse (!) 151   Temp 37.2 °C (98.9 °F) (Temporal)   Resp 20    "Ht 1.778 m (5' 10\")   Wt 118 kg (261 lb)   SpO2 91%   BMI 37.45 kg/m²    Constitutional: Acutely ill-appearing, 2-3 word sentences, oxygen facemask  HENT: Normocephalic, no obvious evidence of acute trauma.  Eyes: No scleral icterus. Normal conjunctiva   Neck: Comfortable movement without any obvious restriction in the range of motion.  Cardiovascular: Upon ascultation I appreciate a regular heart rhythm and a tachycardic rate.   Thorax & Lungs: Normal nonlabored respirations.  Upon application of the stethoscope for auscultation I find there to be no associated chest wall tenderness.  I appreciate no wheezing, rhonchi or rales. There is normal air movement.    Abdomen: The abdomen is not visibly distended. Upon palpation, I find it to be without tenderness.  No mass appreciated.  Skin: The exposed portions of skin reveal no obvious rash or other abnormalities.  Extremities/Musculoskeletal: No obvious sign of acute trauma. No asymmetric calf tenderness or edema.   Neurologic: Alert & oriented. No focal deficits observed.   Psychiatric: Normal affect appropriate for the clinical situation.    DIAGNOSTIC STUDIES / PROCEDURES    LABS/EKGs  Results for orders placed or performed during the hospital encounter of 09/13/21   Lactic acid (lactate)   Result Value Ref Range    Lactic Acid 3.2 (H) 0.5 - 2.0 mmol/L   Lactic acid (lactate): Repeat if initial lactic acid result is greater than 2   Result Value Ref Range    Lactic Acid 2.6 (H) 0.5 - 2.0 mmol/L   Lactic acid (lactate): Repeat if initial lactic acid result is greater than 2   Result Value Ref Range    Lactic Acid 2.8 (H) 0.5 - 2.0 mmol/L   CBC WITH DIFFERENTIAL   Result Value Ref Range    WBC 6.8 4.8 - 10.8 K/uL    RBC 7.20 (H) 4.70 - 6.10 M/uL    Hemoglobin 18.6 (H) 14.0 - 18.0 g/dL    Hematocrit 55.7 (H) 42.0 - 52.0 %    MCV 77.4 (L) 81.4 - 97.8 fL    MCH 25.8 (L) 27.0 - 33.0 pg    MCHC 33.4 (L) 33.7 - 35.3 g/dL    RDW 41.6 35.9 - 50.0 fL    Platelet Count 280 " 164 - 446 K/uL    MPV 10.5 9.0 - 12.9 fL    Neutrophils-Polys 87.00 (H) 44.00 - 72.00 %    Lymphocytes 8.70 (L) 22.00 - 41.00 %    Monocytes 3.50 0.00 - 13.40 %    Eosinophils 0.00 0.00 - 6.90 %    Basophils 0.10 0.00 - 1.80 %    Immature Granulocytes 0.70 0.00 - 0.90 %    Nucleated RBC 0.00 /100 WBC    Neutrophils (Absolute) 5.90 1.82 - 7.42 K/uL    Lymphs (Absolute) 0.59 (L) 1.00 - 4.80 K/uL    Monos (Absolute) 0.24 0.00 - 0.85 K/uL    Eos (Absolute) 0.00 0.00 - 0.51 K/uL    Baso (Absolute) 0.01 0.00 - 0.12 K/uL    Immature Granulocytes (abs) 0.05 0.00 - 0.11 K/uL    NRBC (Absolute) 0.00 K/uL   COMP METABOLIC PANEL   Result Value Ref Range    Sodium 134 (L) 135 - 145 mmol/L    Potassium 3.7 3.6 - 5.5 mmol/L    Chloride 94 (L) 96 - 112 mmol/L    Co2 21 20 - 33 mmol/L    Anion Gap 19.0 (H) 7.0 - 16.0    Glucose 120 (H) 65 - 99 mg/dL    Bun 21 8 - 22 mg/dL    Creatinine 1.13 0.50 - 1.40 mg/dL    Calcium 8.9 8.5 - 10.5 mg/dL    AST(SGOT) 127 (H) 12 - 45 U/L    ALT(SGPT) 112 (H) 2 - 50 U/L    Alkaline Phosphatase 126 (H) 30 - 99 U/L    Total Bilirubin 0.7 0.1 - 1.5 mg/dL    Albumin 4.2 3.2 - 4.9 g/dL    Total Protein 8.0 6.0 - 8.2 g/dL    Globulin 3.8 (H) 1.9 - 3.5 g/dL    A-G Ratio 1.1 g/dL   URINALYSIS    Specimen: Urine   Result Value Ref Range    Color DK Yellow     Character Clear     Specific Gravity 1.030 <1.035    Ph 5.5 5.0 - 8.0    Glucose Negative Negative mg/dL    Ketones Trace (A) Negative mg/dL    Protein 100 (A) Negative mg/dL    Bilirubin Negative Negative    Urobilinogen, Urine 1.0 Negative    Nitrite Negative Negative    Leukocyte Esterase Negative Negative    Occult Blood Negative Negative    Micro Urine Req Microscopic    ESTIMATED GFR   Result Value Ref Range    GFR If African American >60 >60 mL/min/1.73 m 2    GFR If Non African American >60 >60 mL/min/1.73 m 2   URINE MICROSCOPIC (W/UA)   Result Value Ref Range    WBC 5-10 (A) /hpf    Bacteria Few (A) None /hpf    Epithelial Cells Few /hpf     Mucous Threads Moderate /hpf    Hyaline Cast 3-5 (A) /lpf   EKG (NOW)   Result Value Ref Range    Report       [facility] Emergency Dept.    Test Date:  2021  Pt Name:    DIANE CHIANG                Department: ER  MRN:        [id number]                      Room:  Gender:     Male                         Technician: [id number]  :        1990                   Requested By:ER TRIAGE PROTOCOL  Order #:    [id number]                    Reading MD: CAROL QUIROGA MD    Measurements  Intervals                                Axis  Rate:       141                          P:          49  HI:         132                          QRS:        7  QRSD:       68                           T:          28  QT:         262  QTc:        402    Interpretive Statements  12 Lead EKG interpreted by me to show: -- Rate 141 -- Rhythm: Sinus  tachycardia-- Axis: Normal -- HI and QRS Intervals: Normal -- T waves: No  acute  changes -- ST segments: No acute changes -- Ectopy: None. My impression of  this  EKG: Does not indicate acute ischemia at this time.  Electronically Signed On 20 17:40:31 PDT by CAROL QUIROGA MD          RADIOLOGY  DX-CHEST-PORTABLE (1 VIEW)   Final Result      Multifocal bilateral pneumonia, consistent with Covid 19            COURSE & MEDICAL DECISION MAKING  I have reviewed any medical record information, laboratory studies and radiographic results as noted above.  Differential diagnoses includes: COVID-19, dehydration, electrolyte abnormalities, anemia, pneumothorax, pneumonia    Encounter Summary: This is a very pleasant 30 y.o. male who unfortunately required evaluation in the emergency department today with acute respiratory failure secondary to COVID-19.  Unvaccinated.  At the time of examination he was requiring 10 L of nasal cannula oxygen to maintain oxygen saturation above 90%.  He is ill-appearing.  Will not administer antibiotics despite the markers of sepsis as  this is a viral process.  The patient will require hospitalization for further evaluation and care, the meantime he will be watched quite closely here in the emergency department till he is brought upstairs.    CRITICAL CARE  The very real possibilty of a deterioration of this patient's condition required the highest level of my preparedness for sudden, emergent intervention.  I provided critical care services, which included medication orders, frequent reevaluations of the patient's condition and response to treatment, ordering and reviewing test results, and discussing the case with various consultants.  The critical care time associated with the care of the patient was 39 minutes. Review chart for interventions. This time is exclusive of any other billable procedures.         DISPOSITION: Admit in guarded condition      FINAL IMPRESSION  1. Acute hypoxemic respiratory failure due to COVID-19 (HCC)           This dictation was created using voice recognition software. The accuracy of the dictation is limited to the abilities of the software. I expect there may be some errors of grammar and possibly content. The nursing notes were reviewed and certain aspects of this information were incorporated into this note.    Electronically signed by: Alok Bernal M.D., 9/13/2021 3:43 PM       intact

## 2021-09-13 NOTE — ED TRIAGE NOTES
Chief Complaint   Patient presents with   • Shortness of Breath     diagnosed with COVID on 9/7/21, symptoms started on 9/3/21. Patient reports he's had fever, chills and diarrhea.          Patient ambulated to [facility] for above complaint. Patient A+Ox4. Patient 78% on RA and currently requiring 4 L of oxygen. Patient SOB and fatigue.     Pt ambulated to triage for above complaint.  Pt is AO x 4, follows commands, and responds appropriately to questions. Patient's breathing is mild and pain is currently 5/10 on the 0-10 pain scale.  Pt placed in senior lounge. Patient educated on triage process and encouraged to alert staff for any changes.

## 2021-09-14 LAB
ALBUMIN SERPL BCP-MCNC: 3.6 G/DL (ref 3.2–4.9)
ALBUMIN/GLOB SERPL: 1.2 G/DL
ALP SERPL-CCNC: 109 U/L (ref 30–99)
ALT SERPL-CCNC: 86 U/L (ref 2–50)
ANION GAP SERPL CALC-SCNC: 19 MMOL/L (ref 7–16)
AST SERPL-CCNC: 104 U/L (ref 12–45)
BASOPHILS # BLD AUTO: 0.2 % (ref 0–1.8)
BASOPHILS # BLD: 0.01 K/UL (ref 0–0.12)
BILIRUB SERPL-MCNC: 0.6 MG/DL (ref 0.1–1.5)
BUN SERPL-MCNC: 19 MG/DL (ref 8–22)
CALCIUM SERPL-MCNC: 8.3 MG/DL (ref 8.5–10.5)
CHLORIDE SERPL-SCNC: 93 MMOL/L (ref 96–112)
CHOLEST SERPL-MCNC: 62 MG/DL (ref 100–199)
CO2 SERPL-SCNC: 19 MMOL/L (ref 20–33)
CREAT SERPL-MCNC: 0.77 MG/DL (ref 0.5–1.4)
EOSINOPHIL # BLD AUTO: 0 K/UL (ref 0–0.51)
EOSINOPHIL NFR BLD: 0 % (ref 0–6.9)
ERYTHROCYTE [DISTWIDTH] IN BLOOD BY AUTOMATED COUNT: 40.5 FL (ref 35.9–50)
GLOBULIN SER CALC-MCNC: 3 G/DL (ref 1.9–3.5)
GLUCOSE SERPL-MCNC: 132 MG/DL (ref 65–99)
HAV IGM SERPL QL IA: NORMAL
HBV CORE IGM SER QL: NORMAL
HBV SURFACE AG SER QL: NORMAL
HCT VFR BLD AUTO: 48.2 % (ref 42–52)
HCV AB SER QL: NORMAL
HDLC SERPL-MCNC: 17 MG/DL
HGB BLD-MCNC: 16.1 G/DL (ref 14–18)
IMM GRANULOCYTES # BLD AUTO: 0.05 K/UL (ref 0–0.11)
IMM GRANULOCYTES NFR BLD AUTO: 0.9 % (ref 0–0.9)
LDLC SERPL CALC-MCNC: 26 MG/DL
LYMPHOCYTES # BLD AUTO: 0.64 K/UL (ref 1–4.8)
LYMPHOCYTES NFR BLD: 11.7 % (ref 22–41)
MCH RBC QN AUTO: 25.5 PG (ref 27–33)
MCHC RBC AUTO-ENTMCNC: 33.4 G/DL (ref 33.7–35.3)
MCV RBC AUTO: 76.3 FL (ref 81.4–97.8)
MONOCYTES # BLD AUTO: 0.21 K/UL (ref 0–0.85)
MONOCYTES NFR BLD AUTO: 3.8 % (ref 0–13.4)
NEUTROPHILS # BLD AUTO: 4.55 K/UL (ref 1.82–7.42)
NEUTROPHILS NFR BLD: 83.4 % (ref 44–72)
NRBC # BLD AUTO: 0 K/UL
NRBC BLD-RTO: 0 /100 WBC
PLATELET # BLD AUTO: 259 K/UL (ref 164–446)
PMV BLD AUTO: 11.1 FL (ref 9–12.9)
POTASSIUM SERPL-SCNC: 3.7 MMOL/L (ref 3.6–5.5)
PROT SERPL-MCNC: 6.6 G/DL (ref 6–8.2)
RBC # BLD AUTO: 6.32 M/UL (ref 4.7–6.1)
SODIUM SERPL-SCNC: 131 MMOL/L (ref 135–145)
TRIGL SERPL-MCNC: 95 MG/DL (ref 0–149)
WBC # BLD AUTO: 5.5 K/UL (ref 4.8–10.8)

## 2021-09-14 PROCEDURE — 36415 COLL VENOUS BLD VENIPUNCTURE: CPT

## 2021-09-14 PROCEDURE — 700101 HCHG RX REV CODE 250: Performed by: INTERNAL MEDICINE

## 2021-09-14 PROCEDURE — 85025 COMPLETE CBC W/AUTO DIFF WBC: CPT

## 2021-09-14 PROCEDURE — 700101 HCHG RX REV CODE 250: Performed by: STUDENT IN AN ORGANIZED HEALTH CARE EDUCATION/TRAINING PROGRAM

## 2021-09-14 PROCEDURE — 770020 HCHG ROOM/CARE - TELE (206)

## 2021-09-14 PROCEDURE — 700111 HCHG RX REV CODE 636 W/ 250 OVERRIDE (IP): Performed by: STUDENT IN AN ORGANIZED HEALTH CARE EDUCATION/TRAINING PROGRAM

## 2021-09-14 PROCEDURE — 97535 SELF CARE MNGMENT TRAINING: CPT

## 2021-09-14 PROCEDURE — 94640 AIRWAY INHALATION TREATMENT: CPT

## 2021-09-14 PROCEDURE — A9270 NON-COVERED ITEM OR SERVICE: HCPCS | Performed by: INTERNAL MEDICINE

## 2021-09-14 PROCEDURE — 86480 TB TEST CELL IMMUN MEASURE: CPT

## 2021-09-14 PROCEDURE — 700105 HCHG RX REV CODE 258: Performed by: INTERNAL MEDICINE

## 2021-09-14 PROCEDURE — 700102 HCHG RX REV CODE 250 W/ 637 OVERRIDE(OP): Performed by: INTERNAL MEDICINE

## 2021-09-14 PROCEDURE — 94760 N-INVAS EAR/PLS OXIMETRY 1: CPT

## 2021-09-14 PROCEDURE — 99291 CRITICAL CARE FIRST HOUR: CPT | Performed by: INTERNAL MEDICINE

## 2021-09-14 PROCEDURE — 80053 COMPREHEN METABOLIC PANEL: CPT

## 2021-09-14 PROCEDURE — 80074 ACUTE HEPATITIS PANEL: CPT

## 2021-09-14 PROCEDURE — XW033E5 INTRODUCTION OF REMDESIVIR ANTI-INFECTIVE INTO PERIPHERAL VEIN, PERCUTANEOUS APPROACH, NEW TECHNOLOGY GROUP 5: ICD-10-PCS | Performed by: INTERNAL MEDICINE

## 2021-09-14 PROCEDURE — 700111 HCHG RX REV CODE 636 W/ 250 OVERRIDE (IP): Performed by: INTERNAL MEDICINE

## 2021-09-14 PROCEDURE — 80061 LIPID PANEL: CPT

## 2021-09-14 RX ADMIN — CEFTRIAXONE SODIUM 2 G: 2 INJECTION, POWDER, FOR SOLUTION INTRAMUSCULAR; INTRAVENOUS at 09:43

## 2021-09-14 RX ADMIN — LORAZEPAM 2 MG: 2 INJECTION INTRAMUSCULAR; INTRAVENOUS at 18:07

## 2021-09-14 RX ADMIN — ENOXAPARIN SODIUM 40 MG: 40 INJECTION SUBCUTANEOUS at 18:00

## 2021-09-14 RX ADMIN — METOPROLOL TARTRATE 5 MG: 1 INJECTION, SOLUTION INTRAVENOUS at 09:43

## 2021-09-14 RX ADMIN — REMDESIVIR 200 MG: 100 INJECTION, POWDER, LYOPHILIZED, FOR SOLUTION INTRAVENOUS at 00:22

## 2021-09-14 RX ADMIN — LORAZEPAM 2 MG: 2 INJECTION INTRAMUSCULAR; INTRAVENOUS at 00:22

## 2021-09-14 RX ADMIN — BARICITINIB 4 MG: 2 TABLET, FILM COATED ORAL at 13:46

## 2021-09-14 RX ADMIN — LORAZEPAM 2 MG: 2 INJECTION INTRAMUSCULAR; INTRAVENOUS at 22:23

## 2021-09-14 RX ADMIN — LORAZEPAM 2 MG: 2 INJECTION INTRAMUSCULAR; INTRAVENOUS at 09:43

## 2021-09-14 ASSESSMENT — COPD QUESTIONNAIRES
DO YOU EVER COUGH UP ANY MUCUS OR PHLEGM?: NO/ONLY WITH OCCASIONAL COLDS OR INFECTIONS
HAVE YOU SMOKED AT LEAST 100 CIGARETTES IN YOUR ENTIRE LIFE: NO/DON'T KNOW
COPD SCREENING SCORE: 0
DURING THE PAST 4 WEEKS HOW MUCH DID YOU FEEL SHORT OF BREATH: NONE/LITTLE OF THE TIME

## 2021-09-14 ASSESSMENT — ENCOUNTER SYMPTOMS
COUGH: 1
DEPRESSION: 0
NERVOUS/ANXIOUS: 1
CHILLS: 0
SHORTNESS OF BREATH: 1
MYALGIAS: 0
NAUSEA: 0
DIZZINESS: 0
ABDOMINAL PAIN: 0
VOMITING: 0
FEVER: 0
HEADACHES: 0

## 2021-09-14 ASSESSMENT — COGNITIVE AND FUNCTIONAL STATUS - GENERAL
STANDING UP FROM CHAIR USING ARMS: A LITTLE
MOVING FROM LYING ON BACK TO SITTING ON SIDE OF FLAT BED: A LITTLE
WALKING IN HOSPITAL ROOM: A LITTLE
CLIMB 3 TO 5 STEPS WITH RAILING: A LOT
MOBILITY SCORE: 19
SUGGESTED CMS G CODE MODIFIER MOBILITY: CK

## 2021-09-14 ASSESSMENT — ACTIVITIES OF DAILY LIVING (ADL): TOILETING: INDEPENDENT

## 2021-09-14 ASSESSMENT — PAIN DESCRIPTION - PAIN TYPE: TYPE: ACUTE PAIN

## 2021-09-14 ASSESSMENT — GAIT ASSESSMENTS: GAIT LEVEL OF ASSIST: UNABLE TO PARTICIPATE

## 2021-09-14 NOTE — H&P
Hospital Medicine History & Physical Note    Date of Service  9/13/2021    Primary Care Physician  Melody Zuniga P.A.-C.    Consultants      Code Status  Full Code    Chief Complaint  Chief Complaint   Patient presents with   • Shortness of Breath     diagnosed with COVID on 9/7/21, symptoms started on 9/3/21. Patient reports he's had fever, chills and diarrhea.        History of Presenting Illness  Chao Pereira Jr. is a 30 y.o. male with no past medical hx who presented 9/13/2021 for evaluation of progressive shortness of breath after being diagnosed with COVID-19.  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  He states that this is the 10th day of symptoms, he has been taking his wife's metoprolol at home for his tachycardia and had noted chest tightness and myalgias/malaise.      Notable findings include: , LA 2.6, Na 123, AST//112, AG 19, glucose 120, RR 29    Chest xray showing: Multifocal bilateral pneumonia, consistent with Covid 19     RT is consulted on admission due to patient likely needing high-flow, stat IV metoprolol PRN ordered and patient is admitted to telemetry.     I discussed the plan of care with patient.    Review of Systems  Review of Systems   Constitutional: Positive for malaise/fatigue.   Respiratory: Positive for shortness of breath.    Gastrointestinal: Positive for diarrhea.   Musculoskeletal: Positive for myalgias.   All other systems reviewed and are negative.      Past Medical History   has a past medical history of Allergy. reviewed.    Surgical History   has a past surgical history that includes other (2005) and vasectomy.  Reviewed.    Family History  family history includes Hypertension in his father.   Family history reviewed with patient. There is no family history that is pertinent to the chief complaint.     Social History   reports that he has never smoked. He has never used smokeless  tobacco. He reports current alcohol use. He reports that he does not use drugs. reviewed.    Allergies  No Known Allergies    Medications  Prior to Admission Medications   Prescriptions Last Dose Informant Patient Reported? Taking?   albuterol 108 (90 Base) MCG/ACT Aero Soln inhalation aerosol   No No   Sig: Inhale 2 Puffs every 6 hours as needed for Shortness of Breath.   cyclobenzaprine (FLEXERIL) 10 mg Tab   No No   Sig: Take 1 Tablet by mouth 2 times a day as needed for Muscle Spasms.   ibuprofen (MOTRIN) 800 MG Tab   Yes No   meloxicam (MOBIC) 15 MG tablet   Yes No   Patient not taking: Reported on 9/10/2021      Facility-Administered Medications: None       Physical Exam  Temp:  [37.2 °C (98.9 °F)] 37.2 °C (98.9 °F)  Pulse:  [140-151] 149  Resp:  [20-29] 26  BP: (119-139)/(73-82) 125/82  SpO2:  [89 %-93 %] 92 %  Blood Pressure: 119/76   Temperature: 37.2 °C (98.9 °F)   Pulse: (!) 143   Respiration: (!) 22   Pulse Oximetry: 93 %       Physical Exam     Constitutional: acute respiratory distress requiring 10L   HENT: Normocephalic, no obvious evidence of acute trauma.  Eyes: No scleral icterus. Normal conjunctiva   Neck: Comfortable movement without any obvious restriction in the range of motion.  Cardiovascular: tachycardia  Thorax & Lungs: acute respiratory distress. No wheezing, rales or rhonchi heard on ausculation.  there is no obvious chest wall tenderness. Shallow breaths with limited excursion b/l   Abdomen: The abdomen is not visibly distended. Upon palpation, I find it to be without tenderness.  No mass appreciated.  Skin: The exposed portions of skin reveal no obvious rash or other abnormalities.  Extremities/Musculoskeletal: no lower extremity edema with no asymmetry.  Neurologic: Alert & oriented. No focal deficits observed.   Psychiatric: appears anxious    Laboratory:  Recent Labs     09/13/21  1444   WBC 6.8   RBC 7.20*   HEMOGLOBIN 18.6*   HEMATOCRIT 55.7*   MCV 77.4*   MCH 25.8*   MCHC 33.4*    RDW 41.6   PLATELETCT 280   MPV 10.5     Recent Labs     09/13/21  1444   SODIUM 134*   POTASSIUM 3.7   CHLORIDE 94*   CO2 21   GLUCOSE 120*   BUN 21   CREATININE 1.13   CALCIUM 8.9     Recent Labs     09/13/21  1444   ALTSGPT 112*   ASTSGOT 127*   ALKPHOSPHAT 126*   TBILIRUBIN 0.7   GLUCOSE 120*         No results for input(s): NTPROBNP in the last 72 hours.      No results for input(s): TROPONINT in the last 72 hours.    Imaging:  DX-CHEST-PORTABLE (1 VIEW)   Final Result      Multifocal bilateral pneumonia, consistent with Covid 19              Assessment/Plan:  I anticipate this patient will require at least two midnights for appropriate medical management, necessitating inpatient admission.    Acute respiratory failure due to COVID-19 (HCC)  Assessment & Plan  - Conservative use of IVF  - Duonebs, O2 therapy, and proning as needed  -  Lasix as needed to remove excess fluid.  - Decadron 6mg qd  - Holding abx, f/u w/ Procal  - f/u Blood Cultures, Sputum Cx  - Check D-dimer, CRP, Lactic Acid  - Lovenox for VTE ppx  - Consider ID consult for Remdesivir//Tocolizumab    Tachycardia with heart rate 141-160 beats per minute- (present on admission)  Assessment & Plan  IV Metoprolol prn  Admitted with telemetry  Continue to monitor    BMI 39.0-39.9,adult- (present on admission)  Assessment & Plan  Patient's body mass index is 37.45 kg/m². Exercise and nutrition counseling advised prior to discharge    Hypertriglyceridemia- (present on admission)  Assessment & Plan  Lipid panel ordered to assess  Takes no home meds currently      VTE prophylaxis: enoxaparin ppx

## 2021-09-14 NOTE — PROGRESS NOTES
Brief ID note:    -Baricitinib approval requested by Dr. Granda  -Chart reviewed.  Patient with progressive COVID-19 pneumonia, now on HFNC  -Patient already on dexamethasone, prophylactic Lovenox  -Baricitinib approved per [facility] protocol.  Will initiate a 14-day course of 4 mg p.o. daily  -Please provide the patient EUA fact sheet to patient or caregiver, available on Inside [facility]  -Check baseline QuantiFERON gold, hepatitis B surface antigen, surface antibody, core antibody  -Check CBC with differential daily, CMP daily  -Discontinue baricitinib if:  • eGFR < 15 mL/min/1.73m2  • Hgb < 8 g/dL  • Significant immunosuppression: ALC < 200/uL, ANC < 1000/uL  • VTE in last 12 weeks or recurrent DVT/PE  • AST > 225 or ALT > 250 u/L    • Active serious infection other than COVID-19  -Okay to discontinue if cleared for discharge

## 2021-09-14 NOTE — CARE PLAN
The patient is Stable - Low risk of patient condition declining or worsening    Shift Goals  Clinical Goals: maintain oxygenation and HR below 120  Patient Goals: rest and oxygenation  Family Goals: ORTEGA    Progress made toward(s) clinical / shift goals:  Continuous O2 saturation monitoring in place. Oxygenation maintaining above 90%.    Patient is not progressing towards the following goals:

## 2021-09-14 NOTE — PROGRESS NOTES
Report received from JONO Nuñez. Patient arrived to unit via acls RN on non rebreather 15L. RT to bedside to place patient on high flow. O2 saturations 90-93%. Pt A&Ox4. No complaints of pain, just tired. Oriented to room. Instructed to utilize call light for assistance due to increased O2 demand, pt verbalized understanding. Pt wife updated via telephone.

## 2021-09-14 NOTE — ED NOTES
Med rec updated and complete. Allergies reviewed. Pt denies  Antibiotic use in lat 30 days.       home pharmacy [facility]  [phone]    • Zinc 50 MG Tab Take 50 mg by mouth every day.     • vitamin D3 (CHOLECALCIFEROL) 1000 Unit (25 mcg) Tab Take 1,000 Units by mouth every day.     • B Complex Vitamins (VITAMIN B COMPLEX) Tab Take 1 Tablet by mouth every day.     • albuterol 108 (90 Base) MCG/ACT Aero Soln inhalation aerosol Inhale 2 Puffs every 6 hours as needed for Shortness of Breath. 8.5 g 1   • cyclobenzaprine (FLEXERIL) 10 mg Tab Take 1 Tablet by mouth 2 times a day as needed for Muscle Spasms. 30 Tablet 0

## 2021-09-14 NOTE — PROGRESS NOTES
Pulmonary/Critical Care Medicine   Brief Consult Note    Date of service: 9/13/2021  Time: 11:19 PM    Asked to evaluate this patient for treatment options (Remdesivir), he is a previously health 31 yo unvaccinated male with COVID-19 pneumonia. CrCl 121 mL/min, , . Day 10 of symptoms, he is on 12 L of O2.    Patient is already on dexamethasone, prophylactic Lovenox  Remdesivir approved. Will initiate a 5-day course of IV remdesivir  Monitor daily CMP. Discontinue remdesivir if ALT >500 or if creatinine clearance <30  Continue management per hospitalist teams. Please call us back if formal consult requested    KAL Ortega Jr.OFani  [facility] Critical [facility]

## 2021-09-14 NOTE — THERAPY
Physical Therapy Note      Attempted to see patient for PT evaluation.  Patient able to perform supine <> sit with HOB elevated with SPV however upon sitting EOB patient began coughing & O2 sats dropped as low as 81% (on 15L oxymask).  Patient slowly recovered to mid 80s but again dropped to 81% with further coughing.  Further evaluation deferred due to inability to maintain O2 sats.  Patient with limited talking in order to maintain O2 sats.  Will re-attempt formal PT eval when patient's respiratory status is more stable.       09/14/21 0563   Prior Living Situation   Prior Services Home-Independent   Housing / Facility 1 Story House   Steps Into Home 1   Steps In Home 0   Equipment Owned None   Lives with - Patient's Self Care Capacity Spouse;Child Less than 18 Years of Age (Wife & 3 kids)   Prior Level of Functional Mobility   Bed Mobility Independent   Transfer Status Independent   Ambulation Independent   Distance Ambulation (Feet) (community)   Assistive Devices Used None   Stairs Independent   Cognition    Cognition / Consciousness WDL   Level of Consciousness Alert   Comments Pleasant & cooperative, patient with limited talking due to SOB and O2 desat   Balance Assessment   Sitting Balance (Static) Fair +   Sitting Balance (Dynamic) Fair   Weight Shift Sitting Fair   Comments Standing deferred due to desats in sitting   Gait Analysis   Gait Level Of Assist Unable to Participate   Comments Desats with sitting EOB, further activity deferred   Bed Mobility    Supine to Sit Supervised   Sit to Supine Supervised   Scooting Supervised   Comments HOB elevated   Functional Mobility   Sit to Stand Unable to Participate   Bed, Chair, Wheelchair Transfer Unable to Participate   Comments Deferred due to desats with sitting EOB   Activity Tolerance   Sitting in Chair NT   Sitting Edge of Bed 5 min   Standing NT   Comments Limited by O2 sats, SOB   Session Information   Date / Session Number  9/14 - educ only (Needs  EVAL)

## 2021-09-14 NOTE — PROGRESS NOTES
Pt to EOB with therapy this morning. O2 sats 80% on 15L oxymask after returning to rest for several minutes. Nonrebreather placed on patient. O2 sats 85% after nonrebreather initiation, HR 120s. PRN ativan and metoprolol given, educated to take deep breaths. O2 sats persistent between 83-88% on non-rebreather. MD and charge RN aware, patient to transfer to T8 for high flow oxygen.

## 2021-09-14 NOTE — PROGRESS NOTES
4 Eyes Skin Assessment Completed by JONO Sweet and JONO Gonzalez.    Head WDL  Ears WDL  Nose WDL  Mouth WDL  Neck WDL  Breast/Chest WDL  Shoulder Blades WDL  Spine WDL  (R) Arm/Elbow/Hand WDL  (L) Arm/Elbow/Hand WDL  Abdomen WDL  Groin WDL  Scrotum/Coccyx/Buttocks WDL  (R) Leg WDL  (L) Leg WDL  (R) Heel/Foot/Toe WDL  (L) Heel/Foot/Toe WDL          Devices In Places Tele Box, Pulse Ox and Oxy Mask      Interventions In Place Pillows    Possible Skin Injury No    Pictures Uploaded Into Epic N/A  Wound Consult Placed N/A  RN Wound Prevention Protocol Ordered No

## 2021-09-14 NOTE — ASSESSMENT & PLAN NOTE
Patient's body mass index is 37.45 kg/m². Exercise and nutrition counseling advised prior to discharge

## 2021-09-14 NOTE — CARE PLAN
45L 100%  Problem: Humidified High Flow Nasal Cannula  Goal: Maintain adequate oxygenation dependent on patient condition  Description: 1.  Implement humidified high flow oxygen therapy  2.  Titrate high flow oxygen to maintain appropriate SpO2  Outcome: Progressing

## 2021-09-14 NOTE — ASSESSMENT & PLAN NOTE
Patient with COVID-19 pneumonia and worsening respiratory status now with acute respiratory failure  - Conservative use of IVF  - Duonebs, O2 therapy, and proning as tolerated  - Decadron 6mg qd  - Holding abx, procal only mildly elevated, spoke with pharmacy likely covid related, continue to monitor procal will keep off abx for now  - f/u Blood Cultures, Sputum Cx  - Lovenox for VTE ppx  -Patient did receive 1 dose of remdesivir however has been transitioned to high flow nasal cannula thus this was discontinued  - he was started on baricitinib by ID, discontinued 9/18 due to elevated liver enzymes  -Supportive care, wean oxygen as able  - improving, now off HFNC

## 2021-09-14 NOTE — PROGRESS NOTES
Hospital Medicine Daily Progress Note    Date of Service  9/14/2021    Chief Complaint  Chao Pereira Jr. is a 30 y.o. male admitted 9/13/2021 with shortness of breath and covid.     Hospital Course  30 y.o. male with no past medical hx who presented 9/13/2021 for evaluation of progressive shortness of breath after being diagnosed with COVID-19.  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 Chest x-ray showed multifocal bilateral pneumonia. Patient septic from Covid infection. Started on Decadron, critical care was consulted for remdesivir which has been approved and started.    Interval Problem Update  Patient started on remdesivir last night. Patient on 15 L oxygen mask this morning. He continues to be tachycardic and tachypneic. D-dimer mildly elevated but less than 3.     Later this morning patient satting low 80s on max NRB, upgraded patient to telemetry for HFNC, satting 92% on my evaluation. Remdesivir discontinued, and ID consulted for barcitinib.     I have personally seen and examined the patient at bedside. I discussed the plan of care with patient and bedside RN.    Consultants/Specialty  critical care and infectious disease    Code Status  Full Code    Disposition  Patient is not medically cleared.   Anticipate discharge to D.  I have placed the appropriate orders for post-discharge needs.    Review of Systems  Review of Systems   Constitutional: Positive for malaise/fatigue. Negative for chills and fever.   Respiratory: Positive for cough and shortness of breath.    Cardiovascular: Negative for chest pain.   Gastrointestinal: Negative for abdominal pain, nausea and vomiting.   Genitourinary: Negative for dysuria.   Musculoskeletal: Negative for myalgias.   Skin: Negative for rash.   Neurological: Negative for dizziness and headaches.   Psychiatric/Behavioral: Negative for depression. The patient is nervous/anxious.    All other  systems reviewed and are negative.       Physical Exam  Temp:  [36.1 °C (97 °F)-38 °C (100.4 °F)] 36.1 °C (97 °F)  Pulse:  [101-151] 101  Resp:  [18-32] 18  BP: (115-139)/(69-94) 127/73  SpO2:  [85 %-94 %] 90 %    Physical Exam  Vitals and nursing note reviewed.   Constitutional:       General: He is in acute distress.      Appearance: He is obese. He is ill-appearing.   HENT:      Head: Normocephalic and atraumatic.   Eyes:      Conjunctiva/sclera: Conjunctivae normal.   Cardiovascular:      Rate and Rhythm: Regular rhythm. Tachycardia present.      Pulses: Normal pulses.   Pulmonary:      Effort: Respiratory distress present.      Breath sounds: Rhonchi present. No wheezing.      Comments: On HFNC   Abdominal:      General: Abdomen is flat. There is no distension.      Palpations: Abdomen is soft.      Tenderness: There is no abdominal tenderness.   Musculoskeletal:         General: Normal range of motion.      Cervical back: Normal range of motion and neck supple.      Right lower leg: No edema.      Left lower leg: No edema.   Skin:     General: Skin is warm and dry.      Findings: No rash.   Neurological:      General: No focal deficit present.      Mental Status: He is alert and oriented to person, place, and time.      Cranial Nerves: No cranial nerve deficit.   Psychiatric:         Mood and Affect: Mood normal.         Behavior: Behavior normal.         Fluids  No intake or output data in the 24 hours ending 09/14/21 1124    Laboratory  Recent Labs     09/13/21  1444 09/14/21  0234   WBC 6.8 5.5   RBC 7.20* 6.32*   HEMOGLOBIN 18.6* 16.1   HEMATOCRIT 55.7* 48.2   MCV 77.4* 76.3*   MCH 25.8* 25.5*   MCHC 33.4* 33.4*   RDW 41.6 40.5   PLATELETCT 280 259   MPV 10.5 11.1     Recent Labs     09/13/21  1444 09/14/21  0234   SODIUM 134* 131*   POTASSIUM 3.7 3.7   CHLORIDE 94* 93*   CO2 21 19*   GLUCOSE 120* 132*   BUN 21 19   CREATININE 1.13 0.77   CALCIUM 8.9 8.3*             Recent Labs     09/14/21  0234    TRIGLYCERIDE 95   HDL 17*   LDL 26       Imaging  DX-CHEST-PORTABLE (1 VIEW)   Final Result      Multifocal bilateral pneumonia, consistent with Covid 19           Assessment/Plan  Acute respiratory failure due to COVID-19 (HCC)  Assessment & Plan  - Conservative use of IVF  - Duonebs, O2 therapy, and proning as needed  - Decadron 6mg qd  - Holding abx, procal only mildly elevated, spoke with pharmacy likely covid related, continue to monitor procal will keep off abx for now  - f/u Blood Cultures, Sputum Cx  - Lovenox for VTE ppx  - Crit Care consulted for remdesivir, given 1x dose, dc as now on HFNC  - ID consulted for Barcitinib    Tachycardia with heart rate 141-160 beats per minute- (present on admission)  Assessment & Plan  Continue covid treatment  Admitted with telemetry  Continue to monitor    BMI 39.0-39.9,adult- (present on admission)  Assessment & Plan  Patient's body mass index is 37.45 kg/m². Exercise and nutrition counseling advised prior to discharge    Hypertriglyceridemia- (present on admission)  Assessment & Plan  Lipid panel, triglycerides wnl  Takes no home meds currently    resolved       VTE prophylaxis: SCDs/TEDs and enoxaparin ppx    Patient is critically ill.   The patient continues to have: worsening hypoxic respiratory failure.  The vital organ system that is affected is the: Lungs  If untreated there is a high chance of deterioration into and eventually death.  The critical care that I am providing today is: Close monitoring of vital signs including oxygenation, pt on high flow oxygen device, and discussing care with consultants.  The critical care that has been undertaken is medically complex.  There has been no overlap in critical care time.  Critical Care Time not including procedures: 40 mins    I have performed a physical exam and reviewed and updated ROS and Plan today (9/14/2021). In review of yesterday's note (9/13/2021), there are no changes except as documented above.

## 2021-09-14 NOTE — ED NOTES
Repeat lactic completed.  Pt evaluated by RT, pt not requiring high flow NC at this time.    Pt medicated per MAR.

## 2021-09-14 NOTE — THERAPY
Occupational Therapy  Daily Treatment     Patient Name: Chao Pereira Jr.  Age:  30 y.o., Sex:  male  Medical Record #: [id number]  Today's Date: 9/14/2021               Unable to eval pt 2/2 low O2 sats, will completed eval when pt mieddiclly stable.       09/14/21 0824   Activity Tolerance   Comments limited by O2 sats.  pt dropped into the low 80s on 15L/pm.  pt O2 sats remained in the mid 80s after returning to bed.   RN notified

## 2021-09-15 LAB
ALBUMIN SERPL BCP-MCNC: 3.2 G/DL (ref 3.2–4.9)
ALBUMIN/GLOB SERPL: 1 G/DL
ALP SERPL-CCNC: 94 U/L (ref 30–99)
ALT SERPL-CCNC: 73 U/L (ref 2–50)
ANION GAP SERPL CALC-SCNC: 13 MMOL/L (ref 7–16)
AST SERPL-CCNC: 72 U/L (ref 12–45)
BACTERIA UR CULT: NORMAL
BASOPHILS # BLD AUTO: 0 % (ref 0–1.8)
BASOPHILS # BLD: 0 K/UL (ref 0–0.12)
BILIRUB SERPL-MCNC: 0.4 MG/DL (ref 0.1–1.5)
BUN SERPL-MCNC: 24 MG/DL (ref 8–22)
BURR CELLS BLD QL SMEAR: NORMAL
CALCIUM SERPL-MCNC: 8.4 MG/DL (ref 8.5–10.5)
CHLORIDE SERPL-SCNC: 99 MMOL/L (ref 96–112)
CO2 SERPL-SCNC: 21 MMOL/L (ref 20–33)
CREAT SERPL-MCNC: 0.82 MG/DL (ref 0.5–1.4)
EOSINOPHIL # BLD AUTO: 0 K/UL (ref 0–0.51)
EOSINOPHIL NFR BLD: 0 % (ref 0–6.9)
ERYTHROCYTE [DISTWIDTH] IN BLOOD BY AUTOMATED COUNT: 40.8 FL (ref 35.9–50)
GAMMA INTERFERON BACKGROUND BLD IA-ACNC: 0.04 IU/ML
GLOBULIN SER CALC-MCNC: 3.2 G/DL (ref 1.9–3.5)
GLUCOSE SERPL-MCNC: 122 MG/DL (ref 65–99)
HCT VFR BLD AUTO: 47.1 % (ref 42–52)
HGB BLD-MCNC: 15.4 G/DL (ref 14–18)
LYMPHOCYTES # BLD AUTO: 1.15 K/UL (ref 1–4.8)
LYMPHOCYTES NFR BLD: 19.1 % (ref 22–41)
M TB IFN-G BLD-IMP: ABNORMAL
M TB IFN-G CD4+ BCKGRND COR BLD-ACNC: 0 IU/ML
MANUAL DIFF BLD: NORMAL
MCH RBC QN AUTO: 25 PG (ref 27–33)
MCHC RBC AUTO-ENTMCNC: 32.7 G/DL (ref 33.7–35.3)
MCV RBC AUTO: 76.6 FL (ref 81.4–97.8)
METAMYELOCYTES NFR BLD MANUAL: 0.9 %
MITOGEN IGNF BCKGRD COR BLD-ACNC: 0.28 IU/ML
MONOCYTES # BLD AUTO: 0.37 K/UL (ref 0–0.85)
MONOCYTES NFR BLD AUTO: 6.1 % (ref 0–13.4)
MORPHOLOGY BLD-IMP: NORMAL
NEUTROPHILS # BLD AUTO: 4.43 K/UL (ref 1.82–7.42)
NEUTROPHILS NFR BLD: 73.9 % (ref 44–72)
NRBC # BLD AUTO: 0 K/UL
NRBC BLD-RTO: 0 /100 WBC
PLATELET # BLD AUTO: 311 K/UL (ref 164–446)
PLATELET BLD QL SMEAR: NORMAL
PMV BLD AUTO: 10.8 FL (ref 9–12.9)
POIKILOCYTOSIS BLD QL SMEAR: NORMAL
POTASSIUM SERPL-SCNC: 3.6 MMOL/L (ref 3.6–5.5)
PROCALCITONIN SERPL-MCNC: 0.8 NG/ML
PROT SERPL-MCNC: 6.4 G/DL (ref 6–8.2)
QFT TB2 - NIL TBQ2: 0 IU/ML
RBC # BLD AUTO: 6.15 M/UL (ref 4.7–6.1)
RBC BLD AUTO: PRESENT
SIGNIFICANT IND 70042: NORMAL
SITE SITE: NORMAL
SODIUM SERPL-SCNC: 133 MMOL/L (ref 135–145)
SOURCE SOURCE: NORMAL
WBC # BLD AUTO: 6 K/UL (ref 4.8–10.8)

## 2021-09-15 PROCEDURE — 84145 PROCALCITONIN (PCT): CPT

## 2021-09-15 PROCEDURE — 770020 HCHG ROOM/CARE - TELE (206)

## 2021-09-15 PROCEDURE — 80053 COMPREHEN METABOLIC PANEL: CPT

## 2021-09-15 PROCEDURE — 36415 COLL VENOUS BLD VENIPUNCTURE: CPT

## 2021-09-15 PROCEDURE — 700102 HCHG RX REV CODE 250 W/ 637 OVERRIDE(OP): Performed by: INTERNAL MEDICINE

## 2021-09-15 PROCEDURE — 94760 N-INVAS EAR/PLS OXIMETRY 1: CPT

## 2021-09-15 PROCEDURE — 94640 AIRWAY INHALATION TREATMENT: CPT

## 2021-09-15 PROCEDURE — 85027 COMPLETE CBC AUTOMATED: CPT

## 2021-09-15 PROCEDURE — 700102 HCHG RX REV CODE 250 W/ 637 OVERRIDE(OP): Performed by: STUDENT IN AN ORGANIZED HEALTH CARE EDUCATION/TRAINING PROGRAM

## 2021-09-15 PROCEDURE — 700111 HCHG RX REV CODE 636 W/ 250 OVERRIDE (IP): Performed by: STUDENT IN AN ORGANIZED HEALTH CARE EDUCATION/TRAINING PROGRAM

## 2021-09-15 PROCEDURE — 99291 CRITICAL CARE FIRST HOUR: CPT | Performed by: STUDENT IN AN ORGANIZED HEALTH CARE EDUCATION/TRAINING PROGRAM

## 2021-09-15 PROCEDURE — 99291 CRITICAL CARE FIRST HOUR: CPT | Performed by: INTERNAL MEDICINE

## 2021-09-15 PROCEDURE — 85007 BL SMEAR W/DIFF WBC COUNT: CPT

## 2021-09-15 PROCEDURE — A9270 NON-COVERED ITEM OR SERVICE: HCPCS | Performed by: STUDENT IN AN ORGANIZED HEALTH CARE EDUCATION/TRAINING PROGRAM

## 2021-09-15 PROCEDURE — A9270 NON-COVERED ITEM OR SERVICE: HCPCS | Performed by: INTERNAL MEDICINE

## 2021-09-15 RX ADMIN — DEXAMETHASONE 6 MG: 4 TABLET ORAL at 05:59

## 2021-09-15 RX ADMIN — LORAZEPAM 2 MG: 2 INJECTION INTRAMUSCULAR; INTRAVENOUS at 08:48

## 2021-09-15 RX ADMIN — ENOXAPARIN SODIUM 40 MG: 40 INJECTION SUBCUTANEOUS at 17:34

## 2021-09-15 RX ADMIN — LORAZEPAM 2 MG: 2 INJECTION INTRAMUSCULAR; INTRAVENOUS at 21:04

## 2021-09-15 RX ADMIN — BARICITINIB 4 MG: 2 TABLET, FILM COATED ORAL at 17:34

## 2021-09-15 RX ADMIN — ACETAMINOPHEN 650 MG: 325 TABLET, FILM COATED ORAL at 09:54

## 2021-09-15 RX ADMIN — ACETAMINOPHEN 650 MG: 325 TABLET, FILM COATED ORAL at 21:04

## 2021-09-15 RX ADMIN — LORAZEPAM 2 MG: 2 INJECTION INTRAMUSCULAR; INTRAVENOUS at 02:52

## 2021-09-15 ASSESSMENT — PAIN DESCRIPTION - PAIN TYPE
TYPE: ACUTE PAIN
TYPE: ACUTE PAIN

## 2021-09-15 ASSESSMENT — ENCOUNTER SYMPTOMS
DIZZINESS: 0
NERVOUS/ANXIOUS: 1
SHORTNESS OF BREATH: 1
MYALGIAS: 0
VOMITING: 0
FEVER: 0
DEPRESSION: 0
ABDOMINAL PAIN: 0
COUGH: 1
HEADACHES: 0
CHILLS: 0
NAUSEA: 0

## 2021-09-15 ASSESSMENT — FIBROSIS 4 INDEX: FIB4 SCORE: 0.87

## 2021-09-15 NOTE — PROGRESS NOTES
"Pulmonary/Critical Care Medicine   Progress Note    Date of service: 9/15/2021  Time: 0930    I was called by the hospitalist and the rapid response team to evaluate the patient for potential need to transfer to the ICU.  He is an unfortunate 30-year-old unvaccinated male who has COVID-19 pneumonia with associated acute hypoxic respiratory failure.  He is receiving appropriate therapy and is on high flow nasal cannula 60 L/min / 100% FiO2.  Earlier in the morning, he was more tachypneic but with some Ativan and Tylenol, he is breathing more comfortably and speaking in full sentences at the time of my evaluation.  He has refused to self prone because \"I am scared of breathing that way.\"  We discussed the potential benefit of it in preventing need for intubation and he agrees to try.  He has desaturation episodes with coughing and movement down into the mid 80% range but is currently 91%.  At this time, I do not recommend transfer to the intensive care unit as his therapeutic goals are being met in his current location.  We will continue to monitor closely and have a low threshold for transfer and intubation if he worsens.  Recommend continuing the steroids, monoclonal antibody, antitussives, and keeping euvolemic.  Strongly recommend self proning as discussed above.    I spent extensive time in reviewing the patient's condition, physical examination, laboratory and imaging data, prior documentation, in discussion with RN, patient, hospitalist, rapid response team, and in formulating an assessment/plan.    Critical Care time: 31 min. No time overlap, procedures not included in time.  [id number]    "

## 2021-09-15 NOTE — PROGRESS NOTES
Assumed care at 0645. Bedside report received from ernestine Miles. Patient's chart and MAR reviewed. 12 hour chart check complete. Assessment complete, pt 3/10 pain at this time, generalized. Pt is awake in bed, laying on side. Education regarding IS use and proning given. Pt is A & O x 4. Patient was updated on plan of care for the day. Patient gave verbal permission for wife and friends to be updated on his condition. Questions answered and concerns addressed.  Pt denies any additional needs at this time. White board updated. Call light, phone and personal belongings within reach. Bed alarm on and working appropriately. Vital signs stable.

## 2021-09-15 NOTE — THERAPY
Contact Note    Patient Name: Chao Pereira Jr.  Age:  30 y.o., Sex:  male  Medical Record #: [id number]  Today's Date: 9/15/2021       09/15/21 0801   Interdisciplinary Plan of Care Collaboration   Collaboration Comments Pt on 60L HHFNC at 100% FiO2, will hold PT eval until appropriate for therapy.

## 2021-09-15 NOTE — PROGRESS NOTES
Hospital Medicine Daily Progress Note    Date of Service  9/15/2021    Chief Complaint  Chao Pereira Jr. is a 30 y.o. male admitted 9/13/2021 with shortness of breath and covid.     Hospital Course  30 y.o. male with no past medical hx who presented 9/13/2021 for evaluation of progressive shortness of breath after being diagnosed with COVID-19.  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 Chest x-ray showed multifocal bilateral pneumonia. Patient septic from Covid infection. Started on Decadron, critical care was consulted for remdesivir which has been approved and started.    Interval Problem Update    Patient seen and evaluated at bedside, currently on 60 L high flow with 100% FiO2, O2 saturation ranging from 86 to 92%.  Patient has not been self proning as he states he is scared of breathing in that position.  Patient was educated on importance of this and he will continue to try.  We will continue with dexamethasone and baricitinib  Patient evaluated by pulmonary critical care due to his suboptimal oxygen levels and high O2 needs however currently patient is hemodynamically stable and in no significant distress we will continue with current plan of care on telemetry floor      I have personally seen and examined the patient at bedside. I discussed the plan of care with patient and bedside RN.    Consultants/Specialty  critical care and infectious disease    Code Status  Full Code    Disposition  Patient is not medically cleared.   Anticipate discharge to TBD.  I have placed the appropriate orders for post-discharge needs.    Review of Systems  Review of Systems   Constitutional: Positive for malaise/fatigue. Negative for chills and fever.   Respiratory: Positive for cough and shortness of breath.    Cardiovascular: Negative for chest pain.   Gastrointestinal: Negative for abdominal pain, nausea and vomiting.   Genitourinary: Negative for  dysuria.   Musculoskeletal: Negative for myalgias.   Skin: Negative for rash.   Neurological: Negative for dizziness and headaches.   Psychiatric/Behavioral: Negative for depression. The patient is nervous/anxious.    All other systems reviewed and are negative.       Physical Exam  Temp:  [36.2 °C (97.2 °F)-36.7 °C (98 °F)] 36.5 °C (97.7 °F)  Pulse:  [104-124] 117  Resp:  [16-24] 24  BP: (112-117)/(69-71) 117/71  SpO2:  [84 %-94 %] 88 %    Physical Exam  Vitals and nursing note reviewed.   Constitutional:       General: He is not in acute distress.     Appearance: He is obese. He is ill-appearing.      Comments: Appears uncomfortable but is not acutely distressed   HENT:      Head: Normocephalic and atraumatic.   Eyes:      Conjunctiva/sclera: Conjunctivae normal.   Cardiovascular:      Rate and Rhythm: Regular rhythm. Tachycardia present.      Pulses: Normal pulses.   Pulmonary:      Effort: Respiratory distress present.      Breath sounds: Rhonchi present. No wheezing.      Comments: On HFNC   Abdominal:      General: Abdomen is flat. There is no distension.      Palpations: Abdomen is soft.      Tenderness: There is no abdominal tenderness.   Musculoskeletal:         General: Normal range of motion.      Cervical back: Normal range of motion and neck supple.      Right lower leg: No edema.      Left lower leg: No edema.   Skin:     General: Skin is warm and dry.      Findings: No rash.   Neurological:      General: No focal deficit present.      Mental Status: He is alert and oriented to person, place, and time.      Cranial Nerves: No cranial nerve deficit.   Psychiatric:         Mood and Affect: Mood normal.         Behavior: Behavior normal.         Fluids  No intake or output data in the 24 hours ending 09/15/21 1504    Laboratory  Recent Labs     09/13/21  1444 09/14/21  0234 09/15/21  0056   WBC 6.8 5.5 6.0   RBC 7.20* 6.32* 6.15*   HEMOGLOBIN 18.6* 16.1 15.4   HEMATOCRIT 55.7* 48.2 47.1   MCV 77.4* 76.3*  76.6*   MCH 25.8* 25.5* 25.0*   MCHC 33.4* 33.4* 32.7*   RDW 41.6 40.5 40.8   PLATELETCT 280 259 311   MPV 10.5 11.1 10.8     Recent Labs     09/13/21  1444 09/14/21  0234 09/15/21  0056   SODIUM 134* 131* 133*   POTASSIUM 3.7 3.7 3.6   CHLORIDE 94* 93* 99   CO2 21 19* 21   GLUCOSE 120* 132* 122*   BUN 21 19 24*   CREATININE 1.13 0.77 0.82   CALCIUM 8.9 8.3* 8.4*             Recent Labs     09/14/21  0234   TRIGLYCERIDE 95   HDL 17*   LDL 26       Imaging  DX-CHEST-PORTABLE (1 VIEW)   Final Result      Multifocal bilateral pneumonia, consistent with Covid 19           Assessment/Plan  * Acute respiratory failure due to COVID-19 (HCC)  Assessment & Plan  Patient with COVID-19 pneumonia and worsening respiratory status now with acute respiratory failure  - Conservative use of IVF  - Duonebs, O2 therapy, and proning as tolerated  - Decadron 6mg qd  - Holding abx, procal only mildly elevated, spoke with pharmacy likely covid related, continue to monitor procal will keep off abx for now  - f/u Blood Cultures, Sputum Cx  - Lovenox for VTE ppx  -Patient did receive 1 dose of remdesivir however has been transitioned to high flow nasal cannula thus this was discontinued, he was started on baricitinib by ID  -Supportive care, wean oxygen as able    Tachycardia with heart rate 141-160 beats per minute- (present on admission)  Assessment & Plan  Continue covid treatment  Admitted with telemetry  Continue to monitor    BMI 39.0-39.9,adult- (present on admission)  Assessment & Plan  Patient's body mass index is 37.45 kg/m². Exercise and nutrition counseling advised prior to discharge    Hypertriglyceridemia- (present on admission)  Assessment & Plan  Lipid panel, triglycerides wnl  Takes no home meds currently    resolved       VTE prophylaxis: SCDs/TEDs and enoxaparin ppx    Patient is critically ill.   The patient continues to have: worsening hypoxic respiratory failure.  The vital organ system that is affected is the:  Lungs  If untreated there is a high chance of deterioration into and eventually death.  The critical care that I am providing today is: Close monitoring of vital signs including oxygenation, pt on high flow oxygen device, and discussing care with consultants.  The critical care that has been undertaken is medically complex.  There has been no overlap in critical care time.  Critical Care Time not including procedures: 36 mins    I have performed a physical exam and reviewed and updated ROS and Plan today (9/15/2021). In review of yesterday's note (9/14/2021), there are no changes except as documented above.

## 2021-09-15 NOTE — DISCHARGE PLANNING
Care Transition Team Assessment  Assessment completed via chart review. Patient is covid positive.    Patient previously independent. Patient has support from spouse and mother. Patient lives in [place]. Patient sees PCP Melody Zuniga.    Information Source  Orientation Level: Oriented X4  Information Given By: Other (Comments) (chart review)  Who is responsible for making decisions for patient? : Patient    Readmission Evaluation  Is this a readmission?: No    Elopement Risk  Legal Hold: No  Ambulatory or Self Mobile in Wheelchair: Yes  Disoriented: No  Psychiatric Symptoms: None  History of Wandering: No  Elopement Risk: Not at Risk for Elopement    Interdisciplinary Discharge Planning  Lives with - Patient's Self Care Capacity: Spouse, Child Less than 18 Years of Age (Wife & 3 kids)  Housing / Facility: [address] [place] [address]  Prior Services: Home-Independent    Discharge Preparedness  What is your plan after discharge?: Home with help  What are your discharge supports?: Parent, Spouse  Prior Functional Level: Ambulatory, Drives Self, Independent with Activities of Daily Living, Independent with Medication Management  Difficulity with ADLs: None  Difficulity with IADLs: None    Functional Assesment  Prior Functional Level: Ambulatory, Drives Self, Independent with Activities of Daily Living, Independent with Medication Management    Finances  Financial Barriers to Discharge: No  Prescription Coverage: Yes    Advance Directive  Advance Directive?: None    Domestic Abuse  Have you ever been the victim of abuse or violence?: No    Psychological Assessment  History of Substance Abuse: None  History of Psychiatric Problems: No  Non-compliant with Treatment: No  Newly Diagnosed Illness: Yes    Discharge Risks or Barriers  Discharge risks or barriers?: No    Anticipated Discharge Information  Discharge Disposition: Discharged to home/self care (01)  Discharge Address: [address] [place] [address]  Discharge Contact Phone Number: arin  [phone]

## 2021-09-15 NOTE — THERAPY
Missed Therapy     Patient Name: Chao Pereira Jr.  Age:  30 y.o., Sex:  male  Medical Record #: [id number]  Today's Date: 9/15/2021    Occupational Therapy order received and acknowledged. Per department decision algorithm, patient is currently not appropriate for OT services as the patient is not maintaining oxygenation saturations on HFNC 60L at 100%. Will continue to monitor the patient's status and initiate OT services as able and appropriate.       Asher Mckeon OTD, OTR/L

## 2021-09-16 LAB
ALBUMIN SERPL BCP-MCNC: 3.6 G/DL (ref 3.2–4.9)
ALBUMIN/GLOB SERPL: 1.2 G/DL
ALP SERPL-CCNC: 94 U/L (ref 30–99)
ALT SERPL-CCNC: 91 U/L (ref 2–50)
ANION GAP SERPL CALC-SCNC: 13 MMOL/L (ref 7–16)
AST SERPL-CCNC: 87 U/L (ref 12–45)
BASOPHILS # BLD AUTO: 0 % (ref 0–1.8)
BASOPHILS # BLD: 0 K/UL (ref 0–0.12)
BILIRUB SERPL-MCNC: 0.6 MG/DL (ref 0.1–1.5)
BUN SERPL-MCNC: 27 MG/DL (ref 8–22)
CALCIUM SERPL-MCNC: 8.3 MG/DL (ref 8.5–10.5)
CHLORIDE SERPL-SCNC: 99 MMOL/L (ref 96–112)
CO2 SERPL-SCNC: 21 MMOL/L (ref 20–33)
CREAT SERPL-MCNC: 0.74 MG/DL (ref 0.5–1.4)
EOSINOPHIL # BLD AUTO: 0 K/UL (ref 0–0.51)
EOSINOPHIL NFR BLD: 0 % (ref 0–6.9)
ERYTHROCYTE [DISTWIDTH] IN BLOOD BY AUTOMATED COUNT: 39.4 FL (ref 35.9–50)
GLOBULIN SER CALC-MCNC: 2.9 G/DL (ref 1.9–3.5)
GLUCOSE SERPL-MCNC: 136 MG/DL (ref 65–99)
HCT VFR BLD AUTO: 45.2 % (ref 42–52)
HGB BLD-MCNC: 14.9 G/DL (ref 14–18)
IMM GRANULOCYTES # BLD AUTO: 0.03 K/UL (ref 0–0.11)
IMM GRANULOCYTES NFR BLD AUTO: 0.7 % (ref 0–0.9)
LYMPHOCYTES # BLD AUTO: 0.84 K/UL (ref 1–4.8)
LYMPHOCYTES NFR BLD: 18.7 % (ref 22–41)
MCH RBC QN AUTO: 25.3 PG (ref 27–33)
MCHC RBC AUTO-ENTMCNC: 33 G/DL (ref 33.7–35.3)
MCV RBC AUTO: 76.7 FL (ref 81.4–97.8)
MONOCYTES # BLD AUTO: 0.62 K/UL (ref 0–0.85)
MONOCYTES NFR BLD AUTO: 13.8 % (ref 0–13.4)
NEUTROPHILS # BLD AUTO: 3 K/UL (ref 1.82–7.42)
NEUTROPHILS NFR BLD: 66.8 % (ref 44–72)
NRBC # BLD AUTO: 0 K/UL
NRBC BLD-RTO: 0 /100 WBC
PLATELET # BLD AUTO: 319 K/UL (ref 164–446)
PMV BLD AUTO: 11 FL (ref 9–12.9)
POTASSIUM SERPL-SCNC: 3.7 MMOL/L (ref 3.6–5.5)
PROT SERPL-MCNC: 6.5 G/DL (ref 6–8.2)
RBC # BLD AUTO: 5.89 M/UL (ref 4.7–6.1)
SODIUM SERPL-SCNC: 133 MMOL/L (ref 135–145)
WBC # BLD AUTO: 4.5 K/UL (ref 4.8–10.8)

## 2021-09-16 PROCEDURE — 770020 HCHG ROOM/CARE - TELE (206)

## 2021-09-16 PROCEDURE — 700111 HCHG RX REV CODE 636 W/ 250 OVERRIDE (IP): Performed by: STUDENT IN AN ORGANIZED HEALTH CARE EDUCATION/TRAINING PROGRAM

## 2021-09-16 PROCEDURE — 94760 N-INVAS EAR/PLS OXIMETRY 1: CPT

## 2021-09-16 PROCEDURE — 99291 CRITICAL CARE FIRST HOUR: CPT | Performed by: STUDENT IN AN ORGANIZED HEALTH CARE EDUCATION/TRAINING PROGRAM

## 2021-09-16 PROCEDURE — A9270 NON-COVERED ITEM OR SERVICE: HCPCS | Performed by: STUDENT IN AN ORGANIZED HEALTH CARE EDUCATION/TRAINING PROGRAM

## 2021-09-16 PROCEDURE — 85025 COMPLETE CBC W/AUTO DIFF WBC: CPT

## 2021-09-16 PROCEDURE — 36415 COLL VENOUS BLD VENIPUNCTURE: CPT

## 2021-09-16 PROCEDURE — 80053 COMPREHEN METABOLIC PANEL: CPT

## 2021-09-16 PROCEDURE — 700102 HCHG RX REV CODE 250 W/ 637 OVERRIDE(OP): Performed by: STUDENT IN AN ORGANIZED HEALTH CARE EDUCATION/TRAINING PROGRAM

## 2021-09-16 PROCEDURE — 700102 HCHG RX REV CODE 250 W/ 637 OVERRIDE(OP): Performed by: INTERNAL MEDICINE

## 2021-09-16 PROCEDURE — 94640 AIRWAY INHALATION TREATMENT: CPT

## 2021-09-16 PROCEDURE — A9270 NON-COVERED ITEM OR SERVICE: HCPCS | Performed by: INTERNAL MEDICINE

## 2021-09-16 RX ADMIN — ACETAMINOPHEN 650 MG: 325 TABLET, FILM COATED ORAL at 16:32

## 2021-09-16 RX ADMIN — BARICITINIB 4 MG: 2 TABLET, FILM COATED ORAL at 19:53

## 2021-09-16 RX ADMIN — DEXAMETHASONE 6 MG: 4 TABLET ORAL at 05:24

## 2021-09-16 RX ADMIN — LORAZEPAM 2 MG: 2 INJECTION INTRAMUSCULAR; INTRAVENOUS at 16:32

## 2021-09-16 RX ADMIN — ENOXAPARIN SODIUM 40 MG: 40 INJECTION SUBCUTANEOUS at 19:52

## 2021-09-16 ASSESSMENT — ENCOUNTER SYMPTOMS
HEADACHES: 0
DIZZINESS: 0
NERVOUS/ANXIOUS: 1
FEVER: 0
VOMITING: 0
DEPRESSION: 0
MYALGIAS: 0
COUGH: 1
SHORTNESS OF BREATH: 1
NAUSEA: 0
ABDOMINAL PAIN: 0
CHILLS: 0

## 2021-09-16 NOTE — THERAPY
Physical Therapy order received and acknowledged. Per department decision algorithm, patient is currently not appropriate for PT services as the patient is on maximal non invasive oxygen delivery system with tenuous respiratory status and desaturation with simple in bed activities. As respiratory status has only declined since time of PT order placement, will complete order at this time. Please re-order PT services when patient can maintain O2 sats WNL with out of bed tasks with nursing staff.    Suzanne Maynard PT DPT

## 2021-09-16 NOTE — PROGRESS NOTES
Hospital Medicine Daily Progress Note    Date of Service  9/16/2021    Chief Complaint  Chao Pereira Jr. is a 30 y.o. male admitted 9/13/2021 with shortness of breath and covid.     Hospital Course  30 y.o. male with no past medical hx who presented 9/13/2021 for evaluation of progressive shortness of breath after being diagnosed with COVID-19.  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 Chest x-ray showed multifocal bilateral pneumonia. Patient septic from Covid infection. Started on Decadron, critical care was consulted for remdesivir which has been approved and started.    Interval Problem Update    Patient seen and evaluated at bedside, no new issues, does look more relaxed and calm today   Still requiring 60L, 100% FiO2, O2 sats >90%, HR also improved   continue with dexamethasone and baricitinib  Cont. Aggressive RT, encourage proning  Supportive care       I have personally seen and examined the patient at bedside. I discussed the plan of care with patient and bedside RN.    Consultants/Specialty  critical care and infectious disease    Code Status  Full Code    Disposition  Patient is not medically cleared.   Anticipate discharge to TBD.  I have placed the appropriate orders for post-discharge needs.    Review of Systems  Review of Systems   Constitutional: Positive for malaise/fatigue. Negative for chills and fever.   Respiratory: Positive for cough and shortness of breath.    Cardiovascular: Negative for chest pain.   Gastrointestinal: Negative for abdominal pain, nausea and vomiting.   Genitourinary: Negative for dysuria.   Musculoskeletal: Negative for myalgias.   Skin: Negative for rash.   Neurological: Negative for dizziness and headaches.   Psychiatric/Behavioral: Negative for depression. The patient is nervous/anxious.    All other systems reviewed and are negative.       Physical Exam  Temp:  [35.9 °C (96.6 °F)-36.7 °C (98.1  °F)] 35.9 °C (96.6 °F)  Pulse:  [] 96  Resp:  [16-24] 19  BP: (105-123)/(60-81) 123/81  SpO2:  [85 %-97 %] 91 %    Physical Exam  Vitals and nursing note reviewed.   Constitutional:       General: He is not in acute distress.     Appearance: He is obese. He is ill-appearing.      Comments: Appears uncomfortable but is not acutely distressed   HENT:      Head: Normocephalic and atraumatic.   Eyes:      Conjunctiva/sclera: Conjunctivae normal.   Cardiovascular:      Rate and Rhythm: Regular rhythm. Tachycardia present.      Pulses: Normal pulses.   Pulmonary:      Effort: Respiratory distress present.      Breath sounds: Rhonchi present. No wheezing.      Comments: On HFNC   Abdominal:      General: Abdomen is flat. There is no distension.      Palpations: Abdomen is soft.      Tenderness: There is no abdominal tenderness.   Musculoskeletal:         General: Normal range of motion.      Cervical back: Normal range of motion and neck supple.      Right lower leg: No edema.      Left lower leg: No edema.   Skin:     General: Skin is warm and dry.      Findings: No rash.   Neurological:      General: No focal deficit present.      Mental Status: He is alert and oriented to person, place, and time.      Cranial Nerves: No cranial nerve deficit.   Psychiatric:         Mood and Affect: Mood normal.         Behavior: Behavior normal.         Fluids    Intake/Output Summary (Last 24 hours) at 9/16/2021 1534  Last data filed at 9/16/2021 0943  Gross per 24 hour   Intake --   Output 550 ml   Net -550 ml       Laboratory  Recent Labs     09/14/21  0234 09/15/21  0056 09/16/21  0027   WBC 5.5 6.0 4.5*   RBC 6.32* 6.15* 5.89   HEMOGLOBIN 16.1 15.4 14.9   HEMATOCRIT 48.2 47.1 45.2   MCV 76.3* 76.6* 76.7*   MCH 25.5* 25.0* 25.3*   MCHC 33.4* 32.7* 33.0*   RDW 40.5 40.8 39.4   PLATELETCT 259 311 319   MPV 11.1 10.8 11.0     Recent Labs     09/14/21  0234 09/15/21  0056 09/16/21  0027   SODIUM 131* 133* 133*   POTASSIUM 3.7 3.6  3.7   CHLORIDE 93* 99 99   CO2 19* 21 21   GLUCOSE 132* 122* 136*   BUN 19 24* 27*   CREATININE 0.77 0.82 0.74   CALCIUM 8.3* 8.4* 8.3*             Recent Labs     09/14/21  0234   TRIGLYCERIDE 95   HDL 17*   LDL 26       Imaging  DX-CHEST-PORTABLE (1 VIEW)   Final Result      Multifocal bilateral pneumonia, consistent with Covid 19           Assessment/Plan  * Acute respiratory failure due to COVID-19 (HCC)  Assessment & Plan  Patient with COVID-19 pneumonia and worsening respiratory status now with acute respiratory failure  - Conservative use of IVF  - Duonebs, O2 therapy, and proning as tolerated  - Decadron 6mg qd  - Holding abx, procal only mildly elevated, spoke with pharmacy likely covid related, continue to monitor procal will keep off abx for now  - f/u Blood Cultures, Sputum Cx  - Lovenox for VTE ppx  -Patient did receive 1 dose of remdesivir however has been transitioned to high flow nasal cannula thus this was discontinued, he was started on baricitinib by ID  -Supportive care, wean oxygen as able    Tachycardia with heart rate 141-160 beats per minute- (present on admission)  Assessment & Plan  Continue covid treatment  Admitted with telemetry  Continue to monitor    BMI 39.0-39.9,adult- (present on admission)  Assessment & Plan  Patient's body mass index is 37.45 kg/m². Exercise and nutrition counseling advised prior to discharge    Hypertriglyceridemia- (present on admission)  Assessment & Plan  Lipid panel, triglycerides wnl  Takes no home meds currently    resolved       VTE prophylaxis: SCDs/TEDs and enoxaparin ppx    Patient is critically ill.   The patient continues to have: worsening hypoxic respiratory failure.  The vital organ system that is affected is the: Lungs  If untreated there is a high chance of deterioration into and eventually death.  The critical care that I am providing today is: Close monitoring of vital signs including oxygenation, pt on high flow oxygen device, and discussing  care with consultants.  The critical care that has been undertaken is medically complex.  There has been no overlap in critical care time.  Critical Care Time not including procedures: 36 mins    I have performed a physical exam and reviewed and updated ROS and Plan today (9/16/2021). In review of yesterday's note (9/15/2021), there are no changes except as documented above.

## 2021-09-16 NOTE — THERAPY
Missed Therapy     Patient Name: Chao Pereira Jr.  Age:  30 y.o., Sex:  male  Medical Record #: [id number]  Today's Date: 9/16/2021    Occupational Therapy order received and acknowledged. Per department decision algorithm, patient is currently not appropriate for OT services as the patient is on maximal non invasive oxygen delivery system with tenuous respiratory status and desaturation with simple in bed activities. As respiratory status has only declined since time of OT order placement, will complete order at this time. Please re-order OT services when patient can maintain O2 sats WNL with out of bed tasks with nursing staff.      Asher Mckeon OTD, OTR/L

## 2021-09-17 ENCOUNTER — TELEPHONE (OUTPATIENT)
Dept: OCCUPATIONAL MEDICINE | Facility: CLINIC | Age: 31
End: 2021-09-17

## 2021-09-17 LAB
ALBUMIN SERPL BCP-MCNC: 3.4 G/DL (ref 3.2–4.9)
ALBUMIN/GLOB SERPL: 1.2 G/DL
ALP SERPL-CCNC: 90 U/L (ref 30–99)
ALT SERPL-CCNC: 250 U/L (ref 2–50)
ANION GAP SERPL CALC-SCNC: 14 MMOL/L (ref 7–16)
AST SERPL-CCNC: 201 U/L (ref 12–45)
BASOPHILS # BLD AUTO: 0.2 % (ref 0–1.8)
BASOPHILS # BLD: 0.01 K/UL (ref 0–0.12)
BILIRUB SERPL-MCNC: 0.7 MG/DL (ref 0.1–1.5)
BUN SERPL-MCNC: 25 MG/DL (ref 8–22)
CALCIUM SERPL-MCNC: 8.4 MG/DL (ref 8.5–10.5)
CHLORIDE SERPL-SCNC: 98 MMOL/L (ref 96–112)
CO2 SERPL-SCNC: 22 MMOL/L (ref 20–33)
CREAT SERPL-MCNC: 0.77 MG/DL (ref 0.5–1.4)
EOSINOPHIL # BLD AUTO: 0 K/UL (ref 0–0.51)
EOSINOPHIL NFR BLD: 0 % (ref 0–6.9)
ERYTHROCYTE [DISTWIDTH] IN BLOOD BY AUTOMATED COUNT: 38.8 FL (ref 35.9–50)
GLOBULIN SER CALC-MCNC: 2.9 G/DL (ref 1.9–3.5)
GLUCOSE SERPL-MCNC: 136 MG/DL (ref 65–99)
HCT VFR BLD AUTO: 43.7 % (ref 42–52)
HGB BLD-MCNC: 14.7 G/DL (ref 14–18)
IMM GRANULOCYTES # BLD AUTO: 0.06 K/UL (ref 0–0.11)
IMM GRANULOCYTES NFR BLD AUTO: 0.9 % (ref 0–0.9)
LYMPHOCYTES # BLD AUTO: 0.94 K/UL (ref 1–4.8)
LYMPHOCYTES NFR BLD: 14.8 % (ref 22–41)
MCH RBC QN AUTO: 25.8 PG (ref 27–33)
MCHC RBC AUTO-ENTMCNC: 33.6 G/DL (ref 33.7–35.3)
MCV RBC AUTO: 76.8 FL (ref 81.4–97.8)
MONOCYTES # BLD AUTO: 0.75 K/UL (ref 0–0.85)
MONOCYTES NFR BLD AUTO: 11.8 % (ref 0–13.4)
NEUTROPHILS # BLD AUTO: 4.59 K/UL (ref 1.82–7.42)
NEUTROPHILS NFR BLD: 72.3 % (ref 44–72)
NRBC # BLD AUTO: 0 K/UL
NRBC BLD-RTO: 0 /100 WBC
PLATELET # BLD AUTO: 294 K/UL (ref 164–446)
PMV BLD AUTO: 11.4 FL (ref 9–12.9)
POTASSIUM SERPL-SCNC: 3.4 MMOL/L (ref 3.6–5.5)
PROCALCITONIN SERPL-MCNC: 0.11 NG/ML
PROT SERPL-MCNC: 6.3 G/DL (ref 6–8.2)
RBC # BLD AUTO: 5.69 M/UL (ref 4.7–6.1)
SODIUM SERPL-SCNC: 134 MMOL/L (ref 135–145)
WBC # BLD AUTO: 6.4 K/UL (ref 4.8–10.8)

## 2021-09-17 PROCEDURE — A9270 NON-COVERED ITEM OR SERVICE: HCPCS | Performed by: STUDENT IN AN ORGANIZED HEALTH CARE EDUCATION/TRAINING PROGRAM

## 2021-09-17 PROCEDURE — 36415 COLL VENOUS BLD VENIPUNCTURE: CPT

## 2021-09-17 PROCEDURE — 700102 HCHG RX REV CODE 250 W/ 637 OVERRIDE(OP): Performed by: STUDENT IN AN ORGANIZED HEALTH CARE EDUCATION/TRAINING PROGRAM

## 2021-09-17 PROCEDURE — 99291 CRITICAL CARE FIRST HOUR: CPT | Performed by: STUDENT IN AN ORGANIZED HEALTH CARE EDUCATION/TRAINING PROGRAM

## 2021-09-17 PROCEDURE — A9270 NON-COVERED ITEM OR SERVICE: HCPCS | Performed by: INTERNAL MEDICINE

## 2021-09-17 PROCEDURE — 80053 COMPREHEN METABOLIC PANEL: CPT

## 2021-09-17 PROCEDURE — 94640 AIRWAY INHALATION TREATMENT: CPT

## 2021-09-17 PROCEDURE — 700111 HCHG RX REV CODE 636 W/ 250 OVERRIDE (IP): Performed by: STUDENT IN AN ORGANIZED HEALTH CARE EDUCATION/TRAINING PROGRAM

## 2021-09-17 PROCEDURE — 770020 HCHG ROOM/CARE - TELE (206)

## 2021-09-17 PROCEDURE — 700102 HCHG RX REV CODE 250 W/ 637 OVERRIDE(OP): Performed by: INTERNAL MEDICINE

## 2021-09-17 PROCEDURE — 85025 COMPLETE CBC W/AUTO DIFF WBC: CPT

## 2021-09-17 PROCEDURE — 84145 PROCALCITONIN (PCT): CPT

## 2021-09-17 RX ORDER — CYCLOBENZAPRINE HCL 10 MG
10 TABLET ORAL 3 TIMES DAILY PRN
Status: DISCONTINUED | OUTPATIENT
Start: 2021-09-17 | End: 2021-09-20 | Stop reason: HOSPADM

## 2021-09-17 RX ORDER — MELOXICAM 7.5 MG/1
7.5 TABLET ORAL DAILY
Status: DISCONTINUED | OUTPATIENT
Start: 2021-09-17 | End: 2021-09-20 | Stop reason: HOSPADM

## 2021-09-17 RX ADMIN — BARICITINIB 4 MG: 2 TABLET, FILM COATED ORAL at 18:01

## 2021-09-17 RX ADMIN — ENOXAPARIN SODIUM 40 MG: 40 INJECTION SUBCUTANEOUS at 18:02

## 2021-09-17 RX ADMIN — MELOXICAM 7.5 MG: 7.5 TABLET ORAL at 18:01

## 2021-09-17 RX ADMIN — LORAZEPAM 2 MG: 2 INJECTION INTRAMUSCULAR; INTRAVENOUS at 00:01

## 2021-09-17 RX ADMIN — LORAZEPAM 2 MG: 2 INJECTION INTRAMUSCULAR; INTRAVENOUS at 10:24

## 2021-09-17 RX ADMIN — CYCLOBENZAPRINE 10 MG: 10 TABLET, FILM COATED ORAL at 18:08

## 2021-09-17 RX ADMIN — DEXAMETHASONE 6 MG: 4 TABLET ORAL at 04:40

## 2021-09-17 ASSESSMENT — COGNITIVE AND FUNCTIONAL STATUS - GENERAL
STANDING UP FROM CHAIR USING ARMS: A LOT
TOILETING: A LITTLE
MOBILITY SCORE: 12
TURNING FROM BACK TO SIDE WHILE IN FLAT BAD: A LITTLE
CLIMB 3 TO 5 STEPS WITH RAILING: TOTAL
DAILY ACTIVITIY SCORE: 19
MOVING TO AND FROM BED TO CHAIR: A LOT
DRESSING REGULAR UPPER BODY CLOTHING: A LITTLE
SUGGESTED CMS G CODE MODIFIER DAILY ACTIVITY: CK
DRESSING REGULAR LOWER BODY CLOTHING: A LITTLE
HELP NEEDED FOR BATHING: A LOT
SUGGESTED CMS G CODE MODIFIER MOBILITY: CL
MOVING FROM LYING ON BACK TO SITTING ON SIDE OF FLAT BED: A LOT
WALKING IN HOSPITAL ROOM: A LOT

## 2021-09-17 ASSESSMENT — PAIN DESCRIPTION - PAIN TYPE
TYPE: CHRONIC PAIN
TYPE: ACUTE PAIN
TYPE: ACUTE PAIN

## 2021-09-17 ASSESSMENT — ENCOUNTER SYMPTOMS
HEADACHES: 0
NAUSEA: 0
NERVOUS/ANXIOUS: 1
MYALGIAS: 0
DIZZINESS: 0
ABDOMINAL PAIN: 0
COUGH: 1
CHILLS: 0
VOMITING: 0
FEVER: 0
DEPRESSION: 0
SHORTNESS OF BREATH: 1

## 2021-09-17 ASSESSMENT — FIBROSIS 4 INDEX: FIB4 SCORE: 1.38

## 2021-09-17 NOTE — TELEPHONE ENCOUNTER
Phone Number Called: [phone] (home)       Call outcome: Did not leave a detailed message. Requested patient to call back.    Message: Calling patient to follow up with clearing for work. Unable to reach the patient. Asked to call us back and we will also follow up on Monday.

## 2021-09-17 NOTE — CARE PLAN
The patient is Watcher - Medium risk of patient condition declining or worsening    Shift Goals  Clinical Goals: proning- monitor O2  Patient Goals: rest, comfort  Family Goals: n/a    Progress made toward(s) clinical / shift goals: discussed POC, answered all current questions. Discussed importance of proning.     Patient is not progressing towards the following goals:na    Problem: Pain - Standard  Goal: Alleviation of pain or a reduction in pain to the patient’s comfort goal  Outcome: Progressing     Problem: Knowledge Deficit - Standard  Goal: Patient and family/care givers will demonstrate understanding of plan of care, disease process/condition, diagnostic tests and medications  Outcome: Progressing

## 2021-09-17 NOTE — CARE PLAN
The patient is Watcher - Medium risk of patient condition declining or worsening    Shift Goals  Clinical Goals: proning- monitor O2  Patient Goals: rest, comfort  Family Goals: n/a    Progress made toward(s) clinical / shift goals:  Yes    Problem: Pain - Standard  Goal: Alleviation of pain or a reduction in pain to the patient’s comfort goal  9/16/2021 2255 by Chichi Cleary R.N.  Outcome: Progressing  9/16/2021 2254 by BLANCHE BarrientosN.  Outcome: Progressing     Problem: Knowledge Deficit - Standard  Goal: Patient and family/care givers will demonstrate understanding of plan of care, disease process/condition, diagnostic tests and medications  9/16/2021 2255 by Chichi Cleary R.N.  Outcome: Progressing  9/16/2021 2254 by SOLITARIO Barrientos.N.  Outcome: Progressing       Patient is not progressing towards the following goals:

## 2021-09-17 NOTE — PROGRESS NOTES
Hospital Medicine Daily Progress Note    Date of Service  9/17/2021    Chief Complaint  Chao Pereira Jr. is a 30 y.o. male admitted 9/13/2021 with shortness of breath and covid.     Hospital Course  30 y.o. male with no past medical hx who presented 9/13/2021 for evaluation of progressive shortness of breath after being diagnosed with COVID-19.  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 Chest x-ray showed multifocal bilateral pneumonia. Patient septic from Covid infection. Started on Decadron, critical care was consulted for remdesivir which has been approved and started.    Interval Problem Update    Patient seen and evaluated at bedside, no new issues, still having anxiety with proning   O2 needs improving, titrated down to 50L, 70% FiO2  continue with dexamethasone and baricitinib, monitor liver enzymes, if ALT >250 will need to discontinue baricitinib   Cont. Aggressive RT, encourage proning/mobilizing as able   Supportive care   Pain in hips, continue home flexeril and mobic      I have personally seen and examined the patient at bedside. I discussed the plan of care with patient and bedside RN.    Consultants/Specialty  critical care and infectious disease    Code Status  Full Code    Disposition  Patient is not medically cleared.   Anticipate discharge to D.  I have placed the appropriate orders for post-discharge needs.    Review of Systems  Review of Systems   Constitutional: Positive for malaise/fatigue. Negative for chills and fever.   Respiratory: Positive for cough and shortness of breath.    Cardiovascular: Negative for chest pain.   Gastrointestinal: Negative for abdominal pain, nausea and vomiting.   Genitourinary: Negative for dysuria.   Musculoskeletal: Negative for myalgias.   Skin: Negative for rash.   Neurological: Negative for dizziness and headaches.   Psychiatric/Behavioral: Negative for depression. The patient is  nervous/anxious.    All other systems reviewed and are negative.       Physical Exam  Temp:  [36.1 °C (97 °F)-36.9 °C (98.4 °F)] 36.1 °C (97 °F)  Pulse:  [] 93  Resp:  [17-20] 20  BP: (104-122)/(59-80) 104/59  SpO2:  [86 %-97 %] 92 %    Physical Exam  Vitals and nursing note reviewed.   Constitutional:       General: He is not in acute distress.     Appearance: He is obese. He is ill-appearing.      Comments: Appears uncomfortable but is not acutely distressed   HENT:      Head: Normocephalic and atraumatic.   Eyes:      Conjunctiva/sclera: Conjunctivae normal.   Cardiovascular:      Rate and Rhythm: Regular rhythm. Tachycardia present.      Pulses: Normal pulses.   Pulmonary:      Effort: Respiratory distress present.      Breath sounds: Rhonchi present. No wheezing.      Comments: On HFNC   Abdominal:      General: Abdomen is flat. There is no distension.      Palpations: Abdomen is soft.      Tenderness: There is no abdominal tenderness.   Musculoskeletal:         General: Normal range of motion.      Cervical back: Normal range of motion and neck supple.      Right lower leg: No edema.      Left lower leg: No edema.   Skin:     General: Skin is warm and dry.      Findings: No rash.   Neurological:      General: No focal deficit present.      Mental Status: He is alert and oriented to person, place, and time.      Cranial Nerves: No cranial nerve deficit.   Psychiatric:         Mood and Affect: Mood normal.         Behavior: Behavior normal.         Fluids    Intake/Output Summary (Last 24 hours) at 9/17/2021 1453  Last data filed at 9/17/2021 0800  Gross per 24 hour   Intake 240 ml   Output 700 ml   Net -460 ml       Laboratory  Recent Labs     09/15/21  0056 09/16/21  0027 09/17/21  0055   WBC 6.0 4.5* 6.4   RBC 6.15* 5.89 5.69   HEMOGLOBIN 15.4 14.9 14.7   HEMATOCRIT 47.1 45.2 43.7   MCV 76.6* 76.7* 76.8*   MCH 25.0* 25.3* 25.8*   MCHC 32.7* 33.0* 33.6*   RDW 40.8 39.4 38.8   PLATELETCT 311 319 294   MPV  10.8 11.0 11.4     Recent Labs     09/15/21  0056 09/16/21  0027 09/17/21  0055   SODIUM 133* 133* 134*   POTASSIUM 3.6 3.7 3.4*   CHLORIDE 99 99 98   CO2 21 21 22   GLUCOSE 122* 136* 136*   BUN 24* 27* 25*   CREATININE 0.82 0.74 0.77   CALCIUM 8.4* 8.3* 8.4*                   Imaging  DX-CHEST-PORTABLE (1 VIEW)   Final Result      Multifocal bilateral pneumonia, consistent with Covid 19           Assessment/Plan  * Acute respiratory failure due to COVID-19 (HCC)  Assessment & Plan  Patient with COVID-19 pneumonia and worsening respiratory status now with acute respiratory failure  - Conservative use of IVF  - Duonebs, O2 therapy, and proning as tolerated  - Decadron 6mg qd  - Holding abx, procal only mildly elevated, spoke with pharmacy likely covid related, continue to monitor procal will keep off abx for now  - f/u Blood Cultures, Sputum Cx  - Lovenox for VTE ppx  -Patient did receive 1 dose of remdesivir however has been transitioned to high flow nasal cannula thus this was discontinued, he was started on baricitinib by ID  -Supportive care, wean oxygen as able    Tachycardia with heart rate 141-160 beats per minute- (present on admission)  Assessment & Plan  Continue covid treatment  Admitted with telemetry  Continue to monitor    BMI 39.0-39.9,adult- (present on admission)  Assessment & Plan  Patient's body mass index is 37.45 kg/m². Exercise and nutrition counseling advised prior to discharge    Hypertriglyceridemia- (present on admission)  Assessment & Plan  Lipid panel, triglycerides wnl  Takes no home meds currently    resolved       VTE prophylaxis: SCDs/TEDs and enoxaparin ppx    Patient is critically ill.   The patient continues to have: worsening hypoxic respiratory failure.  The vital organ system that is affected is the: Lungs  If untreated there is a high chance of deterioration into and eventually death.  The critical care that I am providing today is: Close monitoring of vital signs including  oxygenation, pt on high flow oxygen device, and discussing care with consultants.  The critical care that has been undertaken is medically complex.  There has been no overlap in critical care time.  Critical Care Time not including procedures: 36 mins    I have performed a physical exam and reviewed and updated ROS and Plan today (9/17/2021). In review of yesterday's note (9/16/2021), there are no changes except as documented above.

## 2021-09-18 LAB
ALBUMIN SERPL BCP-MCNC: 3.4 G/DL (ref 3.2–4.9)
ALBUMIN/GLOB SERPL: 1.2 G/DL
ALP SERPL-CCNC: 85 U/L (ref 30–99)
ALT SERPL-CCNC: 591 U/L (ref 2–50)
ANION GAP SERPL CALC-SCNC: 12 MMOL/L (ref 7–16)
AST SERPL-CCNC: 298 U/L (ref 12–45)
BACTERIA BLD CULT: NORMAL
BACTERIA BLD CULT: NORMAL
BASOPHILS # BLD AUTO: 0.2 % (ref 0–1.8)
BASOPHILS # BLD: 0.02 K/UL (ref 0–0.12)
BILIRUB SERPL-MCNC: 0.6 MG/DL (ref 0.1–1.5)
BUN SERPL-MCNC: 25 MG/DL (ref 8–22)
CALCIUM SERPL-MCNC: 8.4 MG/DL (ref 8.5–10.5)
CHLORIDE SERPL-SCNC: 101 MMOL/L (ref 96–112)
CO2 SERPL-SCNC: 21 MMOL/L (ref 20–33)
CREAT SERPL-MCNC: 0.82 MG/DL (ref 0.5–1.4)
EOSINOPHIL # BLD AUTO: 0.03 K/UL (ref 0–0.51)
EOSINOPHIL NFR BLD: 0.3 % (ref 0–6.9)
ERYTHROCYTE [DISTWIDTH] IN BLOOD BY AUTOMATED COUNT: 40.8 FL (ref 35.9–50)
GLOBULIN SER CALC-MCNC: 2.9 G/DL (ref 1.9–3.5)
GLUCOSE SERPL-MCNC: 97 MG/DL (ref 65–99)
HCT VFR BLD AUTO: 47.1 % (ref 42–52)
HGB BLD-MCNC: 14.8 G/DL (ref 14–18)
IMM GRANULOCYTES # BLD AUTO: 0.12 K/UL (ref 0–0.11)
IMM GRANULOCYTES NFR BLD AUTO: 1.3 % (ref 0–0.9)
LYMPHOCYTES # BLD AUTO: 1.51 K/UL (ref 1–4.8)
LYMPHOCYTES NFR BLD: 16.1 % (ref 22–41)
MCH RBC QN AUTO: 25.1 PG (ref 27–33)
MCHC RBC AUTO-ENTMCNC: 31.4 G/DL (ref 33.7–35.3)
MCV RBC AUTO: 80 FL (ref 81.4–97.8)
MONOCYTES # BLD AUTO: 0.94 K/UL (ref 0–0.85)
MONOCYTES NFR BLD AUTO: 10 % (ref 0–13.4)
NEUTROPHILS # BLD AUTO: 6.74 K/UL (ref 1.82–7.42)
NEUTROPHILS NFR BLD: 72.1 % (ref 44–72)
NRBC # BLD AUTO: 0 K/UL
NRBC BLD-RTO: 0 /100 WBC
PLATELET # BLD AUTO: 229 K/UL (ref 164–446)
PMV BLD AUTO: 12.1 FL (ref 9–12.9)
POTASSIUM SERPL-SCNC: 3.8 MMOL/L (ref 3.6–5.5)
PROT SERPL-MCNC: 6.3 G/DL (ref 6–8.2)
RBC # BLD AUTO: 5.89 M/UL (ref 4.7–6.1)
SIGNIFICANT IND 70042: NORMAL
SIGNIFICANT IND 70042: NORMAL
SITE SITE: NORMAL
SITE SITE: NORMAL
SODIUM SERPL-SCNC: 134 MMOL/L (ref 135–145)
SOURCE SOURCE: NORMAL
SOURCE SOURCE: NORMAL
WBC # BLD AUTO: 9.4 K/UL (ref 4.8–10.8)

## 2021-09-18 PROCEDURE — A9270 NON-COVERED ITEM OR SERVICE: HCPCS | Performed by: STUDENT IN AN ORGANIZED HEALTH CARE EDUCATION/TRAINING PROGRAM

## 2021-09-18 PROCEDURE — 36415 COLL VENOUS BLD VENIPUNCTURE: CPT

## 2021-09-18 PROCEDURE — 770020 HCHG ROOM/CARE - TELE (206)

## 2021-09-18 PROCEDURE — 80053 COMPREHEN METABOLIC PANEL: CPT

## 2021-09-18 PROCEDURE — 85025 COMPLETE CBC W/AUTO DIFF WBC: CPT

## 2021-09-18 PROCEDURE — 99291 CRITICAL CARE FIRST HOUR: CPT | Performed by: STUDENT IN AN ORGANIZED HEALTH CARE EDUCATION/TRAINING PROGRAM

## 2021-09-18 PROCEDURE — 700102 HCHG RX REV CODE 250 W/ 637 OVERRIDE(OP): Performed by: STUDENT IN AN ORGANIZED HEALTH CARE EDUCATION/TRAINING PROGRAM

## 2021-09-18 PROCEDURE — 94640 AIRWAY INHALATION TREATMENT: CPT

## 2021-09-18 PROCEDURE — 700111 HCHG RX REV CODE 636 W/ 250 OVERRIDE (IP): Performed by: STUDENT IN AN ORGANIZED HEALTH CARE EDUCATION/TRAINING PROGRAM

## 2021-09-18 RX ADMIN — LORAZEPAM 2 MG: 2 INJECTION INTRAMUSCULAR; INTRAVENOUS at 22:23

## 2021-09-18 RX ADMIN — MELOXICAM 7.5 MG: 7.5 TABLET ORAL at 05:56

## 2021-09-18 RX ADMIN — DEXAMETHASONE 6 MG: 4 TABLET ORAL at 05:56

## 2021-09-18 RX ADMIN — PSYLLIUM HUSK 1 PACKET: 3.4 POWDER ORAL at 13:57

## 2021-09-18 RX ADMIN — ENOXAPARIN SODIUM 40 MG: 40 INJECTION SUBCUTANEOUS at 17:53

## 2021-09-18 ASSESSMENT — ENCOUNTER SYMPTOMS
VOMITING: 0
FEVER: 0
HEADACHES: 0
NAUSEA: 0
ABDOMINAL PAIN: 0
NERVOUS/ANXIOUS: 1
DIZZINESS: 0
COUGH: 1
DEPRESSION: 0
CHILLS: 0
MYALGIAS: 0
SHORTNESS OF BREATH: 1

## 2021-09-18 ASSESSMENT — FIBROSIS 4 INDEX: FIB4 SCORE: 1.71

## 2021-09-18 NOTE — PROGRESS NOTES
"Pt A&Ox4, somewhat withdrawn, gets anxious when talking about proning, in report it was told to me that \"the ativan alone yesterday made pt desat\", so instead of giving ativan just to prone pt, pt agreed to turn/logroll and lay on sides all day, 02 amount is slowing being decreased, currently on 40L at 50% per HFNC, will monitor.  "

## 2021-09-18 NOTE — PROGRESS NOTES
Monitor Summary  Rhythm: SR, ST  Rate:   Ectopy: O PVC, R PAC, 1.5 & 2.1 sec pause,  .16 / .10 / .38

## 2021-09-18 NOTE — PROGRESS NOTES
Hospital Medicine Daily Progress Note    Date of Service  9/18/2021    Chief Complaint  Chao Pereira Jr. is a 30 y.o. male admitted 9/13/2021 with shortness of breath and covid.     Hospital Course  30 y.o. male with no past medical hx who presented 9/13/2021 for evaluation of progressive shortness of breath after being diagnosed with COVID-19.  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 Chest x-ray showed multifocal bilateral pneumonia. Patient septic from Covid infection. Started on Decadron, critical care was consulted for remdesivir which has been approved and started.    Interval Problem Update    Patient seen and evaluated at bedside, no new issues, O2 needs improving   45L, 50% FiO2   continue with dexamethasone, discontinue baricitnib due to increasing liver enzymes   Cont. Aggressive RT, encourage proning/mobilizing as able   Supportive care     I have personally seen and examined the patient at bedside. I discussed the plan of care with patient and bedside RN.    Consultants/Specialty  critical care and infectious disease    Code Status  Full Code    Disposition  Patient is not medically cleared.   Anticipate discharge to D.  I have placed the appropriate orders for post-discharge needs.    Review of Systems  Review of Systems   Constitutional: Positive for malaise/fatigue. Negative for chills and fever.   Respiratory: Positive for cough and shortness of breath.    Cardiovascular: Negative for chest pain.   Gastrointestinal: Negative for abdominal pain, nausea and vomiting.   Genitourinary: Negative for dysuria.   Musculoskeletal: Negative for myalgias.   Skin: Negative for rash.   Neurological: Negative for dizziness and headaches.   Psychiatric/Behavioral: Negative for depression. The patient is nervous/anxious.    All other systems reviewed and are negative.       Physical Exam  Temp:  [36.1 °C (96.9 °F)-36.8 °C (98.2 °F)] 36.4  °C (97.5 °F)  Pulse:  [67-99] 91  Resp:  [17-22] 18  BP: (101-122)/(65-81) 113/78  SpO2:  [91 %-96 %] 95 %    Physical Exam  Vitals and nursing note reviewed.   Constitutional:       General: He is not in acute distress.     Appearance: He is obese. He is ill-appearing.      Comments: Appears uncomfortable but is not acutely distressed   HENT:      Head: Normocephalic and atraumatic.   Eyes:      Conjunctiva/sclera: Conjunctivae normal.   Cardiovascular:      Rate and Rhythm: Regular rhythm. Tachycardia present.      Pulses: Normal pulses.   Pulmonary:      Effort: Respiratory distress present.      Breath sounds: No wheezing or rhonchi.      Comments: On HFNC, diminished breath sounds  Abdominal:      General: Abdomen is flat. There is no distension.      Palpations: Abdomen is soft.      Tenderness: There is no abdominal tenderness.   Musculoskeletal:         General: Normal range of motion.      Cervical back: Normal range of motion and neck supple.      Right lower leg: No edema.      Left lower leg: No edema.   Skin:     General: Skin is warm and dry.      Findings: No rash.   Neurological:      General: No focal deficit present.      Mental Status: He is alert and oriented to person, place, and time.      Cranial Nerves: No cranial nerve deficit.   Psychiatric:         Mood and Affect: Mood normal.         Behavior: Behavior normal.         Fluids    Intake/Output Summary (Last 24 hours) at 9/18/2021 1413  Last data filed at 9/18/2021 0900  Gross per 24 hour   Intake 360 ml   Output 825 ml   Net -465 ml       Laboratory  Recent Labs     09/16/21  0027 09/17/21  0055 09/18/21  0254   WBC 4.5* 6.4 9.4   RBC 5.89 5.69 5.89   HEMOGLOBIN 14.9 14.7 14.8   HEMATOCRIT 45.2 43.7 47.1   MCV 76.7* 76.8* 80.0*   MCH 25.3* 25.8* 25.1*   MCHC 33.0* 33.6* 31.4*   RDW 39.4 38.8 40.8   PLATELETCT 319 294 229   MPV 11.0 11.4 12.1     Recent Labs     09/16/21  0027 09/17/21  0055 09/18/21  0254   SODIUM 133* 134* 134*    POTASSIUM 3.7 3.4* 3.8   CHLORIDE 99 98 101   CO2 21 22 21   GLUCOSE 136* 136* 97   BUN 27* 25* 25*   CREATININE 0.74 0.77 0.82   CALCIUM 8.3* 8.4* 8.4*                   Imaging  DX-CHEST-PORTABLE (1 VIEW)   Final Result      Multifocal bilateral pneumonia, consistent with Covid 19           Assessment/Plan  * Acute respiratory failure due to COVID-19 (HCC)  Assessment & Plan  Patient with COVID-19 pneumonia and worsening respiratory status now with acute respiratory failure  - Conservative use of IVF  - Duonebs, O2 therapy, and proning as tolerated  - Decadron 6mg qd  - Holding abx, procal only mildly elevated, spoke with pharmacy likely covid related, continue to monitor procal will keep off abx for now  - f/u Blood Cultures, Sputum Cx  - Lovenox for VTE ppx  -Patient did receive 1 dose of remdesivir however has been transitioned to high flow nasal cannula thus this was discontinued  - he was started on baricitinib by ID, discontinued 9/18 due to elevated liver enzymes  -Supportive care, wean oxygen as able    Tachycardia with heart rate 141-160 beats per minute- (present on admission)  Assessment & Plan  Continue covid treatment  Admitted with telemetry  Continue to monitor    BMI 39.0-39.9,adult- (present on admission)  Assessment & Plan  Patient's body mass index is 37.45 kg/m². Exercise and nutrition counseling advised prior to discharge    Hypertriglyceridemia- (present on admission)  Assessment & Plan  Lipid panel, triglycerides wnl  Takes no home meds currently    resolved       VTE prophylaxis: SCDs/TEDs and enoxaparin ppx    Patient is critically ill.   The patient continues to have: worsening hypoxic respiratory failure.  The vital organ system that is affected is the: Lungs  If untreated there is a high chance of deterioration into and eventually death.  The critical care that I am providing today is: Close monitoring of vital signs including oxygenation, pt on high flow oxygen device, and discussing  care with consultants.  The critical care that has been undertaken is medically complex.  There has been no overlap in critical care time.  Critical Care Time not including procedures: 35 mins    I have performed a physical exam and reviewed and updated ROS and Plan today (9/18/2021). In review of yesterday's note (9/17/2021), there are no changes except as documented above.

## 2021-09-19 LAB
ALBUMIN SERPL BCP-MCNC: 3.5 G/DL (ref 3.2–4.9)
ALBUMIN/GLOB SERPL: 1.3 G/DL
ALP SERPL-CCNC: 82 U/L (ref 30–99)
ALT SERPL-CCNC: 690 U/L (ref 2–50)
ANION GAP SERPL CALC-SCNC: 11 MMOL/L (ref 7–16)
AST SERPL-CCNC: 276 U/L (ref 12–45)
BILIRUB SERPL-MCNC: 0.7 MG/DL (ref 0.1–1.5)
BUN SERPL-MCNC: 19 MG/DL (ref 8–22)
CALCIUM SERPL-MCNC: 8.5 MG/DL (ref 8.5–10.5)
CHLORIDE SERPL-SCNC: 101 MMOL/L (ref 96–112)
CO2 SERPL-SCNC: 24 MMOL/L (ref 20–33)
CREAT SERPL-MCNC: 0.65 MG/DL (ref 0.5–1.4)
GLOBULIN SER CALC-MCNC: 2.7 G/DL (ref 1.9–3.5)
GLUCOSE SERPL-MCNC: 89 MG/DL (ref 65–99)
POTASSIUM SERPL-SCNC: 3.9 MMOL/L (ref 3.6–5.5)
PROT SERPL-MCNC: 6.2 G/DL (ref 6–8.2)
SODIUM SERPL-SCNC: 136 MMOL/L (ref 135–145)

## 2021-09-19 PROCEDURE — 99233 SBSQ HOSP IP/OBS HIGH 50: CPT | Performed by: STUDENT IN AN ORGANIZED HEALTH CARE EDUCATION/TRAINING PROGRAM

## 2021-09-19 PROCEDURE — 770001 HCHG ROOM/CARE - MED/SURG/GYN PRIV*

## 2021-09-19 PROCEDURE — 700111 HCHG RX REV CODE 636 W/ 250 OVERRIDE (IP): Performed by: STUDENT IN AN ORGANIZED HEALTH CARE EDUCATION/TRAINING PROGRAM

## 2021-09-19 PROCEDURE — A9270 NON-COVERED ITEM OR SERVICE: HCPCS | Performed by: STUDENT IN AN ORGANIZED HEALTH CARE EDUCATION/TRAINING PROGRAM

## 2021-09-19 PROCEDURE — 36415 COLL VENOUS BLD VENIPUNCTURE: CPT

## 2021-09-19 PROCEDURE — 80053 COMPREHEN METABOLIC PANEL: CPT

## 2021-09-19 PROCEDURE — 700102 HCHG RX REV CODE 250 W/ 637 OVERRIDE(OP): Performed by: STUDENT IN AN ORGANIZED HEALTH CARE EDUCATION/TRAINING PROGRAM

## 2021-09-19 RX ADMIN — ENOXAPARIN SODIUM 40 MG: 40 INJECTION SUBCUTANEOUS at 16:15

## 2021-09-19 RX ADMIN — PSYLLIUM HUSK 1 PACKET: 3.4 POWDER ORAL at 05:27

## 2021-09-19 RX ADMIN — MELOXICAM 7.5 MG: 7.5 TABLET ORAL at 05:28

## 2021-09-19 RX ADMIN — DEXAMETHASONE 6 MG: 4 TABLET ORAL at 05:28

## 2021-09-19 RX ADMIN — LORAZEPAM 2 MG: 2 INJECTION INTRAMUSCULAR; INTRAVENOUS at 21:49

## 2021-09-19 ASSESSMENT — ENCOUNTER SYMPTOMS
FEVER: 0
SHORTNESS OF BREATH: 1
NERVOUS/ANXIOUS: 1
NAUSEA: 0
HEADACHES: 0
DEPRESSION: 0
COUGH: 1
CHILLS: 0
MYALGIAS: 0
DIZZINESS: 0
ABDOMINAL PAIN: 0
VOMITING: 0

## 2021-09-19 ASSESSMENT — FIBROSIS 4 INDEX: FIB4 SCORE: 1.47

## 2021-09-19 ASSESSMENT — PAIN DESCRIPTION - PAIN TYPE: TYPE: ACUTE PAIN

## 2021-09-19 NOTE — PROGRESS NOTES
Received report from Alejandra DE LA CRUZ.  Patient A&O x4 currently laying in bed. Patient on 15L oxymask.  POC discussed with patient.  All needs met at this time.  Bed locked and in lowest position.  Call light within reach.

## 2021-09-19 NOTE — PROGRESS NOTES
Monitor summary:     0.18/0.08/0.38     Sinus Bradycardia/Sinus Rhythm 52 - 96 with a low of 47,     with no ectopy

## 2021-09-19 NOTE — PROGRESS NOTES
Hospital Medicine Daily Progress Note    Date of Service  9/19/2021    Chief Complaint  Chao Pereira Jr. is a 30 y.o. male admitted 9/13/2021 with shortness of breath and covid.     Hospital Course  30 y.o. male with no past medical hx who presented 9/13/2021 for evaluation of progressive shortness of breath after being diagnosed with COVID-19.  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 Chest x-ray showed multifocal bilateral pneumonia. Patient septic from Covid infection. Started on Decadron, critical care was consulted for remdesivir which has been approved and started.    Interval Problem Update    Patient seen and evaluated at bedside, no new issues, O2 needs continue to improve  On 15L mask   continue with dexamethasone, 10 day course  Cont. Aggressive RT, encourage proning/mobilizing as able   Supportive care     I have personally seen and examined the patient at bedside. I discussed the plan of care with patient and bedside RN.    Consultants/Specialty  critical care and infectious disease    Code Status  Full Code    Disposition  Patient is not medically cleared.   Anticipate discharge to D.  I have placed the appropriate orders for post-discharge needs.    Review of Systems  Review of Systems   Constitutional: Positive for malaise/fatigue. Negative for chills and fever.   Respiratory: Positive for cough and shortness of breath.    Cardiovascular: Negative for chest pain.   Gastrointestinal: Negative for abdominal pain, nausea and vomiting.   Genitourinary: Negative for dysuria.   Musculoskeletal: Negative for myalgias.   Skin: Negative for rash.   Neurological: Negative for dizziness and headaches.   Psychiatric/Behavioral: Negative for depression. The patient is nervous/anxious.    All other systems reviewed and are negative.       Physical Exam  Temp:  [36.1 °C (96.9 °F)-36.8 °C (98.2 °F)] 36.3 °C (97.4 °F)  Pulse:  []  100  Resp:  [18-19] 19  BP: (104-121)/(68-76) 121/72  SpO2:  [87 %-95 %] 93 %    Physical Exam  Vitals and nursing note reviewed.   Constitutional:       General: He is not in acute distress.     Appearance: He is obese. He is ill-appearing.      Comments: Appears uncomfortable but is not acutely distressed   HENT:      Head: Normocephalic and atraumatic.   Eyes:      Conjunctiva/sclera: Conjunctivae normal.   Cardiovascular:      Rate and Rhythm: Regular rhythm. Tachycardia present.      Pulses: Normal pulses.   Pulmonary:      Effort: Respiratory distress present.      Breath sounds: No wheezing or rhonchi.      Comments: 15L mask, good air entry, bibasilar rales  Abdominal:      General: Abdomen is flat. There is no distension.      Palpations: Abdomen is soft.      Tenderness: There is no abdominal tenderness.   Musculoskeletal:         General: Normal range of motion.      Cervical back: Normal range of motion and neck supple.      Right lower leg: No edema.      Left lower leg: No edema.   Skin:     General: Skin is warm and dry.      Findings: No rash.   Neurological:      General: No focal deficit present.      Mental Status: He is alert and oriented to person, place, and time.      Cranial Nerves: No cranial nerve deficit.   Psychiatric:         Mood and Affect: Mood normal.         Behavior: Behavior normal.         Fluids    Intake/Output Summary (Last 24 hours) at 9/19/2021 1342  Last data filed at 9/18/2021 1800  Gross per 24 hour   Intake 660 ml   Output 900 ml   Net -240 ml       Laboratory  Recent Labs     09/17/21  0055 09/18/21  0254   WBC 6.4 9.4   RBC 5.69 5.89   HEMOGLOBIN 14.7 14.8   HEMATOCRIT 43.7 47.1   MCV 76.8* 80.0*   MCH 25.8* 25.1*   MCHC 33.6* 31.4*   RDW 38.8 40.8   PLATELETCT 294 229   MPV 11.4 12.1     Recent Labs     09/17/21  0055 09/18/21  0254 09/19/21  0230   SODIUM 134* 134* 136   POTASSIUM 3.4* 3.8 3.9   CHLORIDE 98 101 101   CO2 22 21 24   GLUCOSE 136* 97 89   BUN 25* 25* 19    CREATININE 0.77 0.82 0.65   CALCIUM 8.4* 8.4* 8.5                   Imaging  DX-CHEST-PORTABLE (1 VIEW)   Final Result      Multifocal bilateral pneumonia, consistent with Covid 19           Assessment/Plan  * Acute respiratory failure due to COVID-19 (HCC)  Assessment & Plan  Patient with COVID-19 pneumonia and worsening respiratory status now with acute respiratory failure  - Conservative use of IVF  - Duonebs, O2 therapy, and proning as tolerated  - Decadron 6mg qd  - Holding abx, procal only mildly elevated, spoke with pharmacy likely covid related, continue to monitor procal will keep off abx for now  - f/u Blood Cultures, Sputum Cx  - Lovenox for VTE ppx  -Patient did receive 1 dose of remdesivir however has been transitioned to high flow nasal cannula thus this was discontinued  - he was started on baricitinib by ID, discontinued 9/18 due to elevated liver enzymes  -Supportive care, wean oxygen as able  - improving, now off HFNC    Tachycardia with heart rate 141-160 beats per minute- (present on admission)  Assessment & Plan  Continue covid treatment  Admitted with telemetry  Improved, DC tele    BMI 39.0-39.9,adult- (present on admission)  Assessment & Plan  Patient's body mass index is 37.45 kg/m². Exercise and nutrition counseling advised prior to discharge    Hypertriglyceridemia- (present on admission)  Assessment & Plan  Lipid panel, triglycerides wnl  Takes no home meds currently    resolved       VTE prophylaxis: SCDs/TEDs and enoxaparin ppx      I have performed a physical exam and reviewed and updated ROS and Plan today (9/19/2021). In review of yesterday's note (9/18/2021), there are no changes except as documented above.

## 2021-09-20 ENCOUNTER — TELEPHONE (OUTPATIENT)
Dept: OTHER | Facility: MEDICAL CENTER | Age: 31
End: 2021-09-20

## 2021-09-20 VITALS
OXYGEN SATURATION: 96 % | RESPIRATION RATE: 19 BRPM | HEIGHT: 70 IN | SYSTOLIC BLOOD PRESSURE: 101 MMHG | DIASTOLIC BLOOD PRESSURE: 63 MMHG | HEART RATE: 62 BPM | WEIGHT: 268.96 LBS | TEMPERATURE: 97.2 F | BODY MASS INDEX: 38.51 KG/M2

## 2021-09-20 PROBLEM — R74.01 TRANSAMINITIS: Status: ACTIVE | Noted: 2021-09-20

## 2021-09-20 PROBLEM — R00.0 TACHYCARDIA WITH HEART RATE 141-160 BEATS PER MINUTE: Status: RESOLVED | Noted: 2021-09-13 | Resolved: 2021-09-20

## 2021-09-20 LAB
ALBUMIN SERPL BCP-MCNC: 3.4 G/DL (ref 3.2–4.9)
ALBUMIN/GLOB SERPL: 1.4 G/DL
ALP SERPL-CCNC: 75 U/L (ref 30–99)
ALT SERPL-CCNC: 697 U/L (ref 2–50)
ANION GAP SERPL CALC-SCNC: 14 MMOL/L (ref 7–16)
AST SERPL-CCNC: 177 U/L (ref 12–45)
BILIRUB SERPL-MCNC: 0.4 MG/DL (ref 0.1–1.5)
BUN SERPL-MCNC: 19 MG/DL (ref 8–22)
CALCIUM SERPL-MCNC: 8.6 MG/DL (ref 8.5–10.5)
CHLORIDE SERPL-SCNC: 101 MMOL/L (ref 96–112)
CO2 SERPL-SCNC: 21 MMOL/L (ref 20–33)
CREAT SERPL-MCNC: 0.73 MG/DL (ref 0.5–1.4)
GLOBULIN SER CALC-MCNC: 2.5 G/DL (ref 1.9–3.5)
GLUCOSE SERPL-MCNC: 129 MG/DL (ref 65–99)
POTASSIUM SERPL-SCNC: 3.5 MMOL/L (ref 3.6–5.5)
PROT SERPL-MCNC: 5.9 G/DL (ref 6–8.2)
SODIUM SERPL-SCNC: 136 MMOL/L (ref 135–145)

## 2021-09-20 PROCEDURE — 700102 HCHG RX REV CODE 250 W/ 637 OVERRIDE(OP): Performed by: STUDENT IN AN ORGANIZED HEALTH CARE EDUCATION/TRAINING PROGRAM

## 2021-09-20 PROCEDURE — A9270 NON-COVERED ITEM OR SERVICE: HCPCS | Performed by: STUDENT IN AN ORGANIZED HEALTH CARE EDUCATION/TRAINING PROGRAM

## 2021-09-20 PROCEDURE — 36415 COLL VENOUS BLD VENIPUNCTURE: CPT

## 2021-09-20 PROCEDURE — 99453 REM MNTR PHYSIOL PARAM SETUP: CPT

## 2021-09-20 PROCEDURE — 99454 REM MNTR PHYSIOL PARAM 16-30: CPT

## 2021-09-20 PROCEDURE — 80053 COMPREHEN METABOLIC PANEL: CPT

## 2021-09-20 PROCEDURE — 99239 HOSP IP/OBS DSCHRG MGMT >30: CPT | Performed by: STUDENT IN AN ORGANIZED HEALTH CARE EDUCATION/TRAINING PROGRAM

## 2021-09-20 RX ORDER — POTASSIUM CHLORIDE 20 MEQ/1
40 TABLET, EXTENDED RELEASE ORAL ONCE
Status: COMPLETED | OUTPATIENT
Start: 2021-09-20 | End: 2021-09-20

## 2021-09-20 RX ORDER — ACETAMINOPHEN 500 MG
500 TABLET ORAL EVERY 4 HOURS PRN
COMMUNITY
Start: 2021-09-20

## 2021-09-20 RX ORDER — DEXAMETHASONE 6 MG/1
6 TABLET ORAL DAILY
Qty: 2 TABLET | Refills: 0 | Status: SHIPPED | OUTPATIENT
Start: 2021-09-21 | End: 2021-09-23

## 2021-09-20 RX ADMIN — PSYLLIUM HUSK 1 PACKET: 3.4 POWDER ORAL at 05:39

## 2021-09-20 RX ADMIN — DEXAMETHASONE 6 MG: 4 TABLET ORAL at 05:39

## 2021-09-20 RX ADMIN — MELOXICAM 7.5 MG: 7.5 TABLET ORAL at 05:39

## 2021-09-20 RX ADMIN — CYCLOBENZAPRINE 10 MG: 10 TABLET, FILM COATED ORAL at 05:39

## 2021-09-20 RX ADMIN — POTASSIUM CHLORIDE 40 MEQ: 1500 TABLET, EXTENDED RELEASE ORAL at 13:33

## 2021-09-20 ASSESSMENT — ENCOUNTER SYMPTOMS
HEADACHES: 0
COUGH: 1
SHORTNESS OF BREATH: 1
MYALGIAS: 0
FEVER: 0
NAUSEA: 0
CHILLS: 0
DEPRESSION: 0
ABDOMINAL PAIN: 0
NERVOUS/ANXIOUS: 1
DIZZINESS: 0
VOMITING: 0

## 2021-09-20 ASSESSMENT — PAIN DESCRIPTION - PAIN TYPE
TYPE: ACUTE PAIN

## 2021-09-20 NOTE — DISCHARGE PLANNING
Received Choice form at [phone]  Agency/Facility Name: [facility]  Referral sent per Choice form @ [phone]  Agency/Facility Name: [facility]  Spoke To: Rachel  Outcome: [facility] was unable to receive ETA for DME completion, however [facility] representative Rachel advised [facility] order would be fulfilled today.

## 2021-09-20 NOTE — RESPIRATORY CARE
REMOTE MONITORING PROGRAM by RESPIRATORY THERAPY  9/20/2021 at 1:44 PM by Palmira Thomas, RRT     Patient interviewed by RT. Patient agrees to Remote Monitoring program. Device instruction performed with welcome packet, consent signed and placed at bedside chart. Patient instructed on how to use MyChart and Zoom. No questions at this time.     Flyer and education performed on remote monitoring with bedside RN complete.

## 2021-09-20 NOTE — PROGRESS NOTES
Assumed care at 0700, bedside report received from Alejandra DE LA CRUZ. Pt. Is medical on the monitor. Initial assessment completed, orders reviewed, call light within reach, bed alarm is in use, and hourly rounding in place. POC addressed with patient, no additional questions at this time. Covid-19 surge in effect.

## 2021-09-20 NOTE — DISCHARGE SUMMARY
Discharge Summary    CHIEF COMPLAINT ON ADMISSION  Chief Complaint   Patient presents with   • Shortness of Breath     diagnosed with COVID on 9/7/21, symptoms started on 9/3/21. Patient reports he's had fever, chills and diarrhea.        Reason for Admission  Covid Symptoms     Admission Date  9/13/2021    CODE STATUS  Full Code    HPI & HOSPITAL COURSE  This is a 30 y.o. male unvaccinated with no significant past medical history who presented to the hospital on 9/13/2021 for evaluation of progressive shortness of breath.  Patient had been diagnosed with COVID-19 on 9/7/2021.  On presentation chest x-ray showed multifocal bilateral pneumonia.  Patient was started on Decadron and remdesivir and admitted for further evaluation.  During his admission he had increasing hypoxia and was placed on maximum high flow.  Patient oxygen needs were gradually weaned down.  Home oxygen evaluation was done the patient will require 3 L of nasal cannula oxygen which will be prescribed upon his discharge as well as home monitoring program.  Patient will will be given a prescription for Decadron to complete a total 10-day course.  He was educated on signs and symptoms that should prompt him to seek medical attention.  Upon discharge patient is ambulatory, tolerating diet and is hemodynamically stable.  He is eager and comfortable with his discharge plan.    Therefore, he is discharged in good and stable condition to home with close outpatient follow-up.    The patient met 2-midnight criteria for an inpatient stay at the time of discharge.    Discharge Date  9/20/2021    FOLLOW UP ITEMS POST DISCHARGE  Oxygen needs    DISCHARGE DIAGNOSES  Principal Problem:    Acute respiratory failure due to COVID-19 (HCC) POA: Unknown  Active Problems:    Hypertriglyceridemia POA: Yes    BMI 39.0-39.9,adult POA: Yes    Transaminitis POA: Yes  Resolved Problems:    Tachycardia with heart rate 141-160 beats per minute POA: Yes      FOLLOW UP  No future  appointments.  No follow-up provider specified.    MEDICATIONS ON DISCHARGE     Medication List      START taking these medications      Instructions   acetaminophen 500 MG Tabs  Commonly known as: TYLENOL   Take 1 Tablet by mouth every four hours as needed for Mild Pain or Moderate Pain.  Dose: 500 mg     dexamethasone 6 MG Tabs  Start taking on: September 21, 2021  Commonly known as: DECADRON   Take 1 Tablet by mouth every day for 2 days.  Dose: 6 mg        CONTINUE taking these medications      Instructions   albuterol 108 (90 Base) MCG/ACT Aers inhalation aerosol   Inhale 2 Puffs every 6 hours as needed for Shortness of Breath.  Dose: 2 Puff     cyclobenzaprine 10 mg Tabs  Commonly known as: Flexeril   Take 1 Tablet by mouth 2 times a day as needed for Muscle Spasms.  Dose: 10 mg     Vitamin B Complex Tabs   Take 1 Tablet by mouth every day.  Dose: 1 Tablet     vitamin D3 1000 Unit (25 mcg) Tabs  Commonly known as: cholecalciferol   Take 1,000 Units by mouth every day.  Dose: 1,000 Units     Zinc 50 MG Tabs   Take 50 mg by mouth every day.  Dose: 50 mg            Allergies  No Known Allergies    DIET  Orders Placed This Encounter   Procedures   • Diet Order Diet: Regular     Standing Status:   Standing     Number of Occurrences:   1     Order Specific Question:   Diet:     Answer:   Regular [1]       ACTIVITY  As tolerated.      CONSULTATIONS  Pulmonary critical care    PROCEDURES  N/A    LABORATORY  Lab Results   Component Value Date    SODIUM 136 09/20/2021    POTASSIUM 3.5 (L) 09/20/2021    CHLORIDE 101 09/20/2021    CO2 21 09/20/2021    GLUCOSE 129 (H) 09/20/2021    BUN 19 09/20/2021    CREATININE 0.73 09/20/2021        Lab Results   Component Value Date    WBC 9.4 09/18/2021    HEMOGLOBIN 14.8 09/18/2021    HEMATOCRIT 47.1 09/18/2021    PLATELETCT 229 09/18/2021        Total time of the discharge process exceeds 35 minutes.

## 2021-09-20 NOTE — DISCHARGE PLANNING
Anticipated Discharge Disposition: Home with O2    Action: Patient discussed in IDDR rounds. Patient now on 8L O2. Patient O2 has improved and will need home O2 assessment. Patient will need O2 set up and may be a good candidate for remote home monitoring.    Barriers to Discharge: home O2    Plan: LSW to arrange home O2 when orders are in    1334: LSW received O2 orders. LSW spoke to patient via phone. Patient consents to have referral sent to preferred DME. LSW confirmed demographic information over the phone.

## 2021-09-20 NOTE — PROGRESS NOTES
Hospital Medicine Daily Progress Note    Date of Service  9/20/2021    Chief Complaint  Chao Pereira Jr. is a 30 y.o. male admitted 9/13/2021 with shortness of breath and covid.     Hospital Course  30 y.o. male with no past medical hx who presented 9/13/2021 for evaluation of progressive shortness of breath after being diagnosed with COVID-19.  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 Chest x-ray showed multifocal bilateral pneumonia. Patient septic from Covid infection. Started on Decadron, critical care was consulted for remdesivir which has been approved and started.    Interval Problem Update    Patient seen and evaluated at bedside, no new issues, down to 8L on my assessment, was able to stand up and go to the restroom, felt a little SOB but continues to improve   continue with dexamethasone, 10 day course  Cont. Aggressive RT, encourage proning/mobilizing as able   Supportive care     Will complete home O2 study once patient down to 6L. Anticipate dc home in the next 1-2 days with home O2. Home Oxygen monitoring ordered.     I have personally seen and examined the patient at bedside. I discussed the plan of care with patient and bedside RN.    Consultants/Specialty  critical care and infectious disease    Code Status  Full Code    Disposition  Patient is not medically cleared.   Anticipate discharge to to home with close outpatient follow-up.  I have placed the appropriate orders for post-discharge needs.    Review of Systems  Review of Systems   Constitutional: Positive for malaise/fatigue. Negative for chills and fever.   Respiratory: Positive for cough and shortness of breath.    Cardiovascular: Negative for chest pain.   Gastrointestinal: Negative for abdominal pain, nausea and vomiting.   Genitourinary: Negative for dysuria.   Musculoskeletal: Negative for myalgias.   Skin: Negative for rash.   Neurological: Negative for  dizziness and headaches.   Psychiatric/Behavioral: Negative for depression. The patient is nervous/anxious.    All other systems reviewed and are negative.       Physical Exam  Temp:  [36.2 °C (97.2 °F)-36.9 °C (98.5 °F)] 36.2 °C (97.2 °F)  Pulse:  [] 62  Resp:  [19] 19  BP: (101-121)/(63-73) 101/63  SpO2:  [92 %-96 %] 96 %    Physical Exam  Vitals and nursing note reviewed.   Constitutional:       General: He is not in acute distress.     Appearance: He is obese. He is ill-appearing.      Comments: Resting comfortably    HENT:      Head: Normocephalic and atraumatic.   Eyes:      Conjunctiva/sclera: Conjunctivae normal.   Cardiovascular:      Rate and Rhythm: Regular rhythm. Tachycardia present.      Pulses: Normal pulses.   Pulmonary:      Effort: Respiratory distress present.      Breath sounds: No wheezing or rhonchi.      Comments: 8L mask, good air entry, bibasilar rales  Abdominal:      General: Abdomen is flat. There is no distension.      Palpations: Abdomen is soft.      Tenderness: There is no abdominal tenderness.   Musculoskeletal:         General: Normal range of motion.      Cervical back: Normal range of motion and neck supple.      Right lower leg: No edema.      Left lower leg: No edema.   Skin:     General: Skin is warm and dry.      Findings: No rash.   Neurological:      General: No focal deficit present.      Mental Status: He is alert and oriented to person, place, and time.      Cranial Nerves: No cranial nerve deficit.   Psychiatric:         Mood and Affect: Mood normal.         Behavior: Behavior normal.         Fluids  No intake or output data in the 24 hours ending 09/20/21 1254    Laboratory  Recent Labs     09/18/21  0254   WBC 9.4   RBC 5.89   HEMOGLOBIN 14.8   HEMATOCRIT 47.1   MCV 80.0*   MCH 25.1*   MCHC 31.4*   RDW 40.8   PLATELETCT 229   MPV 12.1     Recent Labs     09/18/21  0254 09/19/21  0230 09/20/21  0105   SODIUM 134* 136 136   POTASSIUM 3.8 3.9 3.5*   CHLORIDE 101 101  101   CO2 21 24 21   GLUCOSE 97 89 129*   BUN 25* 19 19   CREATININE 0.82 0.65 0.73   CALCIUM 8.4* 8.5 8.6                   Imaging  DX-CHEST-PORTABLE (1 VIEW)   Final Result      Multifocal bilateral pneumonia, consistent with Covid 19           Assessment/Plan  * Acute respiratory failure due to COVID-19 (HCC)  Assessment & Plan  Patient with COVID-19 pneumonia and worsening respiratory status now with acute respiratory failure  - Conservative use of IVF  - Duonebs, O2 therapy, and proning as tolerated  - Decadron 6mg qd  - Holding abx, procal only mildly elevated, spoke with pharmacy likely covid related, continue to monitor procal will keep off abx for now  - f/u Blood Cultures, Sputum Cx  - Lovenox for VTE ppx  -Patient did receive 1 dose of remdesivir however has been transitioned to high flow nasal cannula thus this was discontinued  - he was started on baricitinib by ID, discontinued 9/18 due to elevated liver enzymes  -Supportive care, wean oxygen as able  - improving, now off HFNC    Transaminitis- (present on admission)  Assessment & Plan  Likely secondary to Covid, worsened by use of barcitinib which is since been discontinued  -Still elevated but leveling out today  -We will need to be monitored    Tachycardia with heart rate 141-160 beats per minute- (present on admission)  Assessment & Plan  Continue covid treatment  Admitted with telemetry  Improved, DC tele    BMI 39.0-39.9,adult- (present on admission)  Assessment & Plan  Patient's body mass index is 37.45 kg/m². Exercise and nutrition counseling advised prior to discharge    Hypertriglyceridemia- (present on admission)  Assessment & Plan  Lipid panel, triglycerides wnl  Takes no home meds currently    resolved       VTE prophylaxis: SCDs/TEDs and enoxaparin ppx      I have performed a physical exam and reviewed and updated ROS and Plan today (9/20/2021). In review of yesterday's note (9/19/2021), there are no changes except as documented  above.

## 2021-09-20 NOTE — ASSESSMENT & PLAN NOTE
Likely secondary to Covid, worsened by use of barcitinib which is since been discontinued  -Still elevated but leveling out today  -We will need to be monitored

## 2021-09-20 NOTE — DISCHARGE INSTRUCTIONS
Discharge Instructions    Discharged to home by car with relative. Discharged via wheelchair, hospital escort: Yes.  Special equipment needed: Not Applicable    Be sure to schedule a follow-up appointment with your primary care doctor or any specialists as instructed.     Discharge Plan:   Diet Plan: Discussed  Activity Level: Discussed  Confirmed Follow up Appointment: Appointment Scheduled  Confirmed Symptoms Management: Discussed  Medication Reconciliation Updated: Yes    I understand that a diet low in cholesterol, fat, and sodium is recommended for good health. Unless I have been given specific instructions below for another diet, I accept this instruction as my diet prescription.   Other diet: regular    Special Instructions: None    · Is patient discharged on Warfarin / Coumadin?   No     Depression / Suicide Risk    As you are discharged from this [facility] facility, it is important to learn how to keep safe from harming yourself.    Recognize the warning signs:  · Abrupt changes in personality, positive or negative- including increase in energy   · Giving away possessions  · Change in eating patterns- significant weight changes-  positive or negative  · Change in sleeping patterns- unable to sleep or sleeping all the time   · Unwillingness or inability to communicate  · Depression  · Unusual sadness, discouragement and loneliness  · Talk of wanting to die  · Neglect of personal appearance   · Rebelliousness- reckless behavior  · Withdrawal from people/activities they love  · Confusion- inability to concentrate     If you or a loved one observes any of these behaviors or has concerns about self-harm, here's what you can do:  · Talk about it- your feelings and reasons for harming yourself  · Remove any means that you might use to hurt yourself (examples: pills, rope, extension cords, firearm)  · Get professional help from the community (Mental Health, Substance Abuse, psychological counseling)  · Do not  be alone:Call your Safe Contact- someone whom you trust who will be there for you.  · Call your local CRISIS HOTLINE 465-8350 or 174-384-7630  · Call your local Children's Mobile Crisis Response Team [place] (192) 306-4353 or www.Joyent  · Call the toll free National Suicide Prevention Hotlines   · National Suicide Prevention Lifeline 087-474-HHRP (5958)  · [facility] Hope [facility] Network 800-SUICIDE (624-3576)        Take medications as directed, complete steroid course   Use oxygen as directed   Follow up with PCP in the next 1-2 weeks         COVID-19 Frequently Asked Questions  COVID-19 (coronavirus disease) is an infection that is caused by a large family of viruses. Some viruses cause illness in people and others cause illness in animals like camels, cats, and bats. In some cases, the viruses that cause illness in animals can spread to humans.  Where did the coronavirus come from?  In December 2019, [place] told the World Health Organization (WHO) of several cases of lung disease (human respiratory illness). These cases were linked to an open seafood and livestock market in the city of [facility]. The link to the seafood and livestock market suggests that the virus may have spread from animals to humans. However, since that first outbreak in December, the virus has also been shown to spread from person to person.  What is the name of the disease and the virus?  Disease name  Early on, this disease was called novel coronavirus. This is because scientists determined that the disease was caused by a new (novel) respiratory virus. The World Health Organization (WHO) has now named the disease COVID-19, or coronavirus disease.  Virus name  The virus that causes the disease is called severe acute respiratory syndrome coronavirus 2 (SARS-CoV-2).  More information on disease and virus naming  World Health Organization (WHO):  www.who.int/emergencies/diseases/novel-coronavirus-2019/technical-guidance/naming-the-coronavirus-disease-(covid-2019)-and-the-virus-that-causes-it  Who is at risk for complications from coronavirus disease?  Some people may be at higher risk for complications from coronavirus disease. This includes older adults and people who have chronic diseases, such as heart disease, diabetes, and lung disease.  If you are at higher risk for complications, take these extra precautions:  · Avoid close contact with people who are sick or have a fever or cough. Stay at least 3-6 ft (1-2 m) away from them, if possible.  · Wash your hands often with soap and water for at least 20 seconds.  · Avoid touching your face, mouth, nose, or eyes.  · Keep supplies on hand at home, such as food, medicine, and cleaning supplies.  · Stay home as much as possible.  · Avoid social gatherings and travel.  How does coronavirus disease spread?  The virus that causes coronavirus disease spreads easily from person to person (is contagious). There are also cases of community-spread disease. This means the disease has spread to:  · People who have no known contact with other infected people.  · People who have not traveled to areas where there are known cases.  It appears to spread from one person to another through droplets from coughing or sneezing.  Can I get the virus from touching surfaces or objects?  There is still a lot that we do not know about the virus that causes coronavirus disease. Scientists are basing a lot of information on what they know about similar viruses, such as:  · Viruses cannot generally survive on surfaces for long. They need a human body (host) to survive.  · It is more likely that the virus is spread by close contact with people who are sick (direct contact), such as through:  ? Shaking hands or hugging.  ? Breathing in respiratory droplets that travel through the air. This can happen when an infected person coughs or  sneezes on or near other people.  · It is less likely that the virus is spread when a person touches a surface or object that has the virus on it (indirect contact). The virus may be able to enter the body if the person touches a surface or object and then touches his or her face, eyes, nose, or mouth.  Can a person spread the virus without having symptoms of the disease?  It may be possible for the virus to spread before a person has symptoms of the disease, but this is most likely not the main way the virus is spreading. It is more likely for the virus to spread by being in close contact with people who are sick and breathing in the respiratory droplets of a sick person's cough or sneeze.  What are the symptoms of coronavirus disease?  Symptoms vary from person to person and can range from mild to severe. Symptoms may include:  · Fever.  · Cough.  · Tiredness, weakness, or fatigue.  · Fast breathing or feeling short of breath.  These symptoms can appear anywhere from 2 to 14 days after you have been exposed to the virus. If you develop symptoms, call your health care provider. People with severe symptoms may need hospital care.  If I am exposed to the virus, how long does it take before symptoms start?  Symptoms of coronavirus disease may appear anywhere from 2 to 14 days after a person has been exposed to the virus. If you develop symptoms, call your health care provider.  Should I be tested for this virus?  Your health care provider will decide whether to test you based on your symptoms, history of exposure, and your risk factors.  How does a health care provider test for this virus?  Health care providers will collect samples to send for testing. Samples may include:  · Taking a swab of fluid from the nose.  · Taking fluid from the lungs by having you cough up mucus (sputum) into a sterile cup.  · Taking a blood sample.  · Taking a stool or urine sample.  Is there a treatment or vaccine for this  virus?  Currently, there is no vaccine to prevent coronavirus disease. Also, there are no medicines like antibiotics or antivirals to treat the virus. A person who becomes sick is given supportive care, which means rest and fluids. A person may also relieve his or her symptoms by using over-the-counter medicines that treat sneezing, coughing, and runny nose. These are the same medicines that a person takes for the common cold.  If you develop symptoms, call your health care provider. People with severe symptoms may need hospital care.  What can I do to protect myself and my family from this virus?         You can protect yourself and your family by taking the same actions that you would take to prevent the spread of other viruses. Take the following actions:  · Wash your hands often with soap and water for at least 20 seconds. If soap and water are not available, use alcohol-based hand .  · Avoid touching your face, mouth, nose, or eyes.  · Cough or sneeze into a tissue, sleeve, or elbow. Do not cough or sneeze into your hand or the air.  ? If you cough or sneeze into a tissue, throw it away immediately and wash your hands.  · Disinfect objects and surfaces that you frequently touch every day.  · Avoid close contact with people who are sick or have a fever or cough. Stay at least 3-6 ft (1-2 m) away from them, if possible.  · Stay home if you are sick, except to get medical care. Call your health care provider before you get medical care.  · Make sure your vaccines are up to date. Ask your health care provider what vaccines you need.  What should I do if I need to travel?  Follow travel recommendations from your local health authority, the CDC, and WHO.  Travel information and advice  · Centers for Disease Control and Prevention (CDC): www.cdc.gov/coronavirus/2019-ncov/travelers/index.html  · World Health Organization (WHO): www.who.int/emergencies/diseases/novel-coronavirus-2019/travel-advice  Know the  risks and take action to protect your health  · You are at higher risk of getting coronavirus disease if you are traveling to areas with an outbreak or if you are exposed to travelers from areas with an outbreak.  · Wash your hands often and practice good hygiene to lower the risk of catching or spreading the virus.  What should I do if I am sick?  General instructions to stop the spread of infection  · Wash your hands often with soap and water for at least 20 seconds. If soap and water are not available, use alcohol-based hand .  · Cough or sneeze into a tissue, sleeve, or elbow. Do not cough or sneeze into your hand or the air.  · If you cough or sneeze into a tissue, throw it away immediately and wash your hands.  · Stay home unless you must get medical care. Call your health care provider or local health authority before you get medical care.  · Avoid public areas. Do not take public transportation, if possible.  · If you can, wear a mask if you must go out of the house or if you are in close contact with someone who is not sick.  Keep your home clean  · Disinfect objects and surfaces that are frequently touched every day. This may include:  ? Counters and tables.  ? Doorknobs and light switches.  ? Sinks and faucets.  ? Electronics such as phones, remote controls, keyboards, computers, and tablets.  · Wash dishes in hot, soapy water or use a . Air-dry your dishes.  · Wash laundry in hot water.  Prevent infecting other household members  · Let healthy household members care for children and pets, if possible. If you have to care for children or pets, wash your hands often and wear a mask.  · Sleep in a different bedroom or bed, if possible.  · Do not share personal items, such as razors, toothbrushes, deodorant, griffin, brushes, towels, and washcloths.  Where to find more information  Centers for Disease Control and Prevention (CDC)  · Information and news updates:  www.cdc.gov/coronavirus/2019-ncov  World Health Organization (WHO)  · Information and news updates: www.who.int/emergencies/diseases/novel-coronavirus-2019  · Coronavirus health topic: www.who.int/health-topics/coronavirus  · Questions and answers on COVID-19: www.who.int/news-room/q-a-detail/d-l-rqlgfsnehcjni  · Global tracker: [employer]  American Academy of Pediatrics (AAP)  · Information for families: www.healthychildren.org/English/health-issues/conditions/chest-lungs/Pages/2019-Novel-Coronavirus.aspx  The coronavirus situation is changing rapidly. Check your local health authority website or the CDC and WHO websites for updates and news.  When should I contact a health care provider?  · Contact your health care provider if you have symptoms of an infection, such as fever or cough, and you:  ? Have been near anyone who is known to have coronavirus disease.  ? Have come into contact with a person who is suspected to have coronavirus disease.  ? Have traveled outside of the country.  When should I get emergency medical care?  · Get help right away by calling your local emergency services (911 in the [place] if you have:  ? Trouble breathing.  ? Pain or pressure in your chest.  ? Confusion.  ? Blue-tinged lips and fingernails.  ? Difficulty waking from sleep.  ? Symptoms that get worse.  Let the emergency medical personnel know if you think you have coronavirus disease.  Summary  · A new respiratory virus is spreading from person to person and causing COVID-19 (coronavirus disease).  · The virus that causes COVID-19 appears to spread easily. It spreads from one person to another through droplets from coughing or sneezing.  · Older adults and those with chronic diseases are at higher risk of disease. If you are at higher risk for complications, take extra precautions.  · There is currently no vaccine to prevent coronavirus disease. There are no medicines, such as antibiotics or antivirals, to treat the  virus.  · You can protect yourself and your family by washing your hands often, avoiding touching your face, and covering your coughs and sneezes.  This information is not intended to replace advice given to you by your health care provider. Make sure you discuss any questions you have with your health care provider.  Document Released: 04/14/2020 Document Revised: 04/14/2020 Document Reviewed: 04/14/2020  Elsevier Patient Education © 2020 Elsevier Inc.

## 2021-09-20 NOTE — FACE TO FACE
"Face to Face Note  -  Durable Medical Equipment    Evelina Gibbs M.D. - NPI: [id number]  I certify that this patient is under my care and that they had a durable medical equipment(DME)face to face encounter by myself that meets the physician DME face-to-face encounter requirements with this patient on:    Date of encounter:   Patient:                    MRN:                       YOB: 2023  Chao Pereira Jr.  [id number]  1990     The encounter with the patient was in whole, or in part, for the following medical condition, which is the primary reason for durable medical equipment:  Covid-19 Infection    I certify that, based on my findings, the following durable medical equipment is medically necessary:  Oxygen.    HOME O2 Saturation Measurements:(Values must be present for Home Oxygen orders)  Room air sat at rest: 89  Room air sat with amb: 88  With liters of O2: 3, O2 sat at rest with O2: 91  With Liters of O2: 3, O2 sat with amb with O2 : 90  Is the patient mobile?: Yes    My Clinical findings support the need for the above equipment due to:  Hypoxia    Supporting Symptoms: The patient requires supplemental oxygen, as the following interventions have been tried with limited or no improvement: \"Bronchodilators and/or steroid inhalers, \"Positive expiratory pressure therapies, \"Oral and/or IV steroids, \"Ambulation with oximetry and \"Incentive spirometry    If patient feels more short of breath, they can go up to 6 liters per minute and contact healthcare provider.  "

## 2021-09-21 ENCOUNTER — TELEPHONE (OUTPATIENT)
Dept: OTHER | Facility: MEDICAL CENTER | Age: 31
End: 2021-09-21

## 2021-09-21 VITALS — OXYGEN SATURATION: 81 %

## 2021-09-21 VITALS — RESPIRATION RATE: 39 BRPM | OXYGEN SATURATION: 93 % | HEART RATE: 102 BPM

## 2021-09-21 NOTE — TELEPHONE ENCOUNTER
Called patient fraction saturation alert of 81%. Patient tells me he took his oxygen off for a minute to go to the bathroom. He is currently back on oxygen and his oxygen saturation is 90%.

## 2021-09-21 NOTE — PROGRESS NOTES
Pt dc'd now. IV and monitor removed; monitor room notified. Pt left unit via wheelchair with escort. Personal belongings with pt when leaving unit. Pt given discharge instructions prior to leaving unit including where to  prescriptions and when to follow-up; verbalizes understanding. Copy of discharge instructions with pt and in the chart.

## 2021-09-22 ENCOUNTER — TELEPHONE (OUTPATIENT)
Dept: OTHER | Facility: MEDICAL CENTER | Age: 31
End: 2021-09-22

## 2021-09-22 NOTE — TELEPHONE ENCOUNTER
2059  Called patient due to him disconnectig, patient was able to reconnect while on the phone.      2100 Sp02 94% BPM 98 R28

## 2021-09-23 ENCOUNTER — TELEPHONE (OUTPATIENT)
Dept: OTHER | Facility: MEDICAL CENTER | Age: 31
End: 2021-09-23

## 2021-09-23 VITALS — RESPIRATION RATE: 36 BRPM | OXYGEN SATURATION: 87 % | HEART RATE: 87 BPM

## 2021-09-23 VITALS — OXYGEN SATURATION: 100 % | HEART RATE: 93 BPM

## 2021-09-23 VITALS — HEART RATE: 121 BPM | RESPIRATION RATE: 33 BRPM | OXYGEN SATURATION: 82 %

## 2021-09-23 NOTE — TELEPHONE ENCOUNTER
Called Chao to let him know he was disconnected from the monitor. He is reconnecting to monitor now.     0957: Current vitals. HR: 108, O2: 94%

## 2021-09-23 NOTE — TELEPHONE ENCOUNTER
Called patient as he has questions about the program. Answered all questions, and since patient is saturating in the high 90's on 2 liters, instructed patient that he can decrease his oxygen to 1.5 liters to see how he does.     Patient verbalized understanding.

## 2021-09-24 ENCOUNTER — TELEPHONE (OUTPATIENT)
Dept: OTHER | Facility: MEDICAL CENTER | Age: 31
End: 2021-09-24

## 2021-09-24 VITALS — RESPIRATION RATE: 27 BRPM | HEART RATE: 114 BPM | OXYGEN SATURATION: 89 %

## 2021-09-24 NOTE — TELEPHONE ENCOUNTER
Called patient he advised hs I okay and was walking around with no O2 he is now sitting down and had place O2 back on he is good

## 2021-09-25 ENCOUNTER — TELEPHONE (OUTPATIENT)
Dept: OTHER | Facility: MEDICAL CENTER | Age: 31
End: 2021-09-25

## 2021-09-25 VITALS — OXYGEN SATURATION: 79 % | RESPIRATION RATE: 32 BRPM | HEART RATE: 110 BPM

## 2021-09-25 NOTE — TELEPHONE ENCOUNTER
Called patient and he stated he was having a coughing fit. He did stabilize and said he was doing fine before the call was ended.

## 2021-09-28 ENCOUNTER — TELEPHONE (OUTPATIENT)
Dept: OTHER | Facility: MEDICAL CENTER | Age: 31
End: 2021-09-28

## 2021-09-28 VITALS — HEART RATE: 149 BPM | OXYGEN SATURATION: 90 %

## 2021-09-29 ENCOUNTER — TELEPHONE (OUTPATIENT)
Dept: OTHER | Facility: MEDICAL CENTER | Age: 31
End: 2021-09-29

## 2021-09-29 NOTE — TELEPHONE ENCOUNTER
Called and spoke with patient. He advised he was up making dinner and that was probably why his HR was elevated. He did say he was feeling more winded last night and increased his o2 to 2.5L.

## 2021-09-29 NOTE — TELEPHONE ENCOUNTER
Chao called to let us know that he is going to disconnect to take a shower. Will connect when done.

## 2021-10-01 ENCOUNTER — TELEPHONE (OUTPATIENT)
Dept: OTHER | Facility: MEDICAL CENTER | Age: 31
End: 2021-10-01

## 2021-10-01 NOTE — DOCUMENTATION QUERY
[facility]                                                                       Query Response Note      PATIENT:               DIANE CHIANG  ACCT #:                  [id number]  MRN:                     [id number]  :                      1990  ADMIT DATE:       2021 3:00 PM  DISCH DATE:        2021 5:57 PM  RESPONDING  PROVIDER #:        [id number]           QUERY TEXT:    Please clarify the relationship, if any, between acute respiratory failure and sepsis (ruled in per query response).    NOTE: If an appropriate response is not listed below, please respond with a new note.      The patient's Clinical Indicators include:   (1529 - 0756) SIRS 2/4 (HR: 118 - 149; RR: 22 - 32)   Lactic Acid: 2.6 - 3.2; Procal: 0.53; CRP: 8.46   H&P: Acute resp. failure d/t COVID-19. CXR: bilat. pna, consistent w/ Covid 19   ID Consult: Progressive COVID-19 pneumonia, now on HFNC   Query Response: Sepsis present on admission - tachycardia & sepsis 2/2 COVID  Treatment: Decadron; olumiant; remdesivir; O2 per HFNC; oximetry; RT; lab/imaging  Risk Factors: COVID-19; acute respiratory failure; pneumonia    Thank You,  Olivia Antonio RN  Clinical    Connect via [employer]  Options provided:   -- Organ dysfunction/failure of acute respiratory failure is due to or associated with sepsis   -- Unrelated to each other   -- Unable to determine      Query created by: Olivia Antonio on 10/1/2021 10:56 AM    RESPONSE TEXT:    Organ dysfunction/failure of acute respiratory failure is due to or associated with sepsis          Electronically signed by:  CHARI KOEHLER MD 10/1/2021 11:10 AM

## 2021-10-01 NOTE — DOCUMENTATION QUERY
[facility]                                                                       Query Response Note      PATIENT:               DIANE CHIANG  ACCT #:                  [id number]  MRN:                     [id number]  :                      1990  ADMIT DATE:       2021 3:00 PM  DISCH DATE:        2021 5:57 PM  RESPONDING  PROVIDER #:        [id number]           QUERY TEXT:    COVID-19 is documented in the H&P. Pulse rate 118 to 149 and respirations 22 to 32 are noted in the Vitals Flowsheet beginning at 1529 to 2326 on day of admit ().    Can a diagnosis be provided based on the clinical indicators documented in the medical record.       NOTE:  If an appropriate response is not listed below, please respond with a new note.      The patient's Clinical Indicators include:  ED Note: Will not administer antibiotics despite markers of sepsis as this is a viral process.   (1413) SIRS 1/4 (HR: 151); (1529 - 2326) SIRS 2/4 (HR: 118 - 149; RR: 22 - 32)   Lactic Acid: 2.6 - 3.2; Procal: 0.53; CRP: 8.46   H&P: Acute resp. failure d/t COVID-19. CXR showing: Multifocal bilat. pneumonia, consistent w/ Covid 19   ID Consult: Progressive COVID-19 pneumonia, now on HFNC  Treatment: Decadron; olumiant; remdesivir; O2 per HFNC; oximetry; RT; lab/imaging  Risk Factors: COVID-19; acute respiratory failure; pneumonia    Thank You,  Olivia Antonio RN  Clinical    Connect via [employer]  Options provided:   -- Sepsis present on admission, (please document known or suspected etiology)   -- Sepsis has been ruled out   -- SIRS that is unrelated to any infection   -- SIRS of non-infectious origin with acute organ dysfunction   -- Unable to determine      Query created by: Olivia Antonio on 9/15/2021 11:41 AM    RESPONSE TEXT:    sepsis present on admission - tachycardia and sepsis 2/2 COVID           Electronically signed by:  KATHI HERNANDEZ MD 9/30/2021 11:20 PM

## 2021-10-01 NOTE — TELEPHONE ENCOUNTER
Called patient he advised he is okay and will reconnect his phone , he also just picked up 5 more wrist bands

## 2021-10-02 ENCOUNTER — TELEPHONE (OUTPATIENT)
Dept: OTHER | Facility: MEDICAL CENTER | Age: 31
End: 2021-10-02

## 2021-10-02 VITALS — HEART RATE: 136 BPM | OXYGEN SATURATION: 95 % | RESPIRATION RATE: 12 BRPM

## 2021-10-03 ENCOUNTER — TELEPHONE (OUTPATIENT)
Dept: OTHER | Facility: MEDICAL CENTER | Age: 31
End: 2021-10-03

## 2021-10-03 VITALS — HEART RATE: 132 BPM | RESPIRATION RATE: 32 BRPM | OXYGEN SATURATION: 95 %

## 2021-10-03 NOTE — TELEPHONE ENCOUNTER
Patient needs to change his batteries, while be reconnected soon. Patient also stated he doing fine.

## 2021-10-04 ENCOUNTER — TELEPHONE (OUTPATIENT)
Dept: OTHER | Facility: MEDICAL CENTER | Age: 31
End: 2021-10-04

## 2021-10-04 ENCOUNTER — OFFICE VISIT (OUTPATIENT)
Dept: MEDICAL GROUP | Facility: IMAGING CENTER | Age: 31
End: 2021-10-04
Payer: COMMERCIAL

## 2021-10-04 VITALS
SYSTOLIC BLOOD PRESSURE: 114 MMHG | HEIGHT: 70 IN | HEART RATE: 124 BPM | TEMPERATURE: 98.6 F | RESPIRATION RATE: 28 BRPM | WEIGHT: 259 LBS | DIASTOLIC BLOOD PRESSURE: 68 MMHG | BODY MASS INDEX: 37.08 KG/M2 | OXYGEN SATURATION: 97 %

## 2021-10-04 DIAGNOSIS — Z99.81 ON HOME OXYGEN THERAPY: ICD-10-CM

## 2021-10-04 DIAGNOSIS — R00.0 TACHYCARDIA: ICD-10-CM

## 2021-10-04 DIAGNOSIS — J96.00 ACUTE RESPIRATORY FAILURE DUE TO COVID-19 (HCC): ICD-10-CM

## 2021-10-04 DIAGNOSIS — E87.6 HYPOKALEMIA: ICD-10-CM

## 2021-10-04 DIAGNOSIS — R74.8 ELEVATED LIVER ENZYMES: ICD-10-CM

## 2021-10-04 DIAGNOSIS — R71.8 MICROCYTOSIS: ICD-10-CM

## 2021-10-04 DIAGNOSIS — U07.1 ACUTE RESPIRATORY FAILURE DUE TO COVID-19 (HCC): ICD-10-CM

## 2021-10-04 DIAGNOSIS — R74.01 TRANSAMINITIS: ICD-10-CM

## 2021-10-04 PROCEDURE — 99214 OFFICE O/P EST MOD 30 MIN: CPT | Performed by: PHYSICIAN ASSISTANT

## 2021-10-04 ASSESSMENT — PAIN SCALES - GENERAL: PAINLEVEL: 2=MINIMAL-SLIGHT

## 2021-10-04 ASSESSMENT — FIBROSIS 4 INDEX: FIB4 SCORE: 0.94

## 2021-10-04 NOTE — TELEPHONE ENCOUNTER
Chao called to let us know that he was going to disconnect from the monitor and shower. He will call us when he is going to reconnect.

## 2021-10-04 NOTE — ASSESSMENT & PLAN NOTE
Patient admitted to [facility] from 9 13-9 20 due to acute respiratory failure with pneumonia from COVID-19.  He was given a course of Decadron and sent home on home O2.  He is currently on 2 L/min.  His oxygen and heart rate are being monitored by the [facility] remote home monitoring.  His heart rate still continues to be above 110.  Oxygen staying above 90%.  He tried to wean down his oxygen to 1/2 L and became more tachycardic.  He does have an appointment with pulmonology this week.

## 2021-10-04 NOTE — PROGRESS NOTES
Subjective:     CC:   Chief Complaint   Patient presents with   • Shortness of Breath       HPI:   Chao presents today to discuss:    Acute respiratory failure due to COVID-19 ([facility])  Patient admitted to [facility] from 9 13-9 20 due to acute respiratory failure with pneumonia from COVID-19.  He was given a course of Decadron and sent home on home O2.  He is currently on 2 L/min.  His oxygen and heart rate are being monitored by the [facility] remote home monitoring.  His heart rate still continues to be above 110.  Oxygen staying above 90%.  He tried to wean down his oxygen to 1/2 L and became more tachycardic.  He does have an appointment with pulmonology this week.    Transaminitis  Noted in the hospital.  No abdominal pain, nausea, vomiting.      Past Medical History:   Diagnosis Date   • Allergy      Social History     Tobacco Use   • Smoking status: Never Smoker   • Smokeless tobacco: Never Used   Vaping Use   • Vaping Use: Never used   Substance Use Topics   • Alcohol use: Yes     Comment: occasional - 1x a month   • Drug use: No     Current Outpatient Medications Ordered in Epic   Medication Sig Dispense Refill   • acetaminophen (TYLENOL) 500 MG Tab Take 1 Tablet by mouth every four hours as needed for Mild Pain or Moderate Pain.     • Zinc 50 MG Tab Take 50 mg by mouth every day.     • vitamin D3 (CHOLECALCIFEROL) 1000 Unit (25 mcg) Tab Take 1,000 Units by mouth every day.     • B Complex Vitamins (VITAMIN B COMPLEX) Tab Take 1 Tablet by mouth every day.     • albuterol 108 (90 Base) MCG/ACT Aero Soln inhalation aerosol Inhale 2 Puffs every 6 hours as needed for Shortness of Breath. 8.5 g 1   • cyclobenzaprine (FLEXERIL) 10 mg Tab Take 1 Tablet by mouth 2 times a day as needed for Muscle Spasms. 30 Tablet 0     No current Epic-ordered facility-administered medications on file.     Patient has no known allergies.    Health Maintenance: We will await pulmonology recommendations regarding when he should  "receive COVID-19 vaccine.    ROS:  Gen: no fevers/chills  Eyes: no changes in vision  Pulm: See HPI  CV: no chest pain, no palpitations, no edema  GI: no nausea/vomiting, no diarrhea  Skin: no rash, no lesions  Neuro: no headaches, no numbness/tingling  Heme/Lymph: no easy bruising or bleeding  Objective:   Exam:  /68   Pulse (!) 124   Temp 37 °C (98.6 °F)   Resp (!) 28   Ht 1.778 m (5' 10\")   Wt 117 kg (259 lb)   SpO2 97% Comment: 2L via NC  BMI 37.16 kg/m²    Body mass index is 37.16 kg/m².    Gen: Alert and oriented, No apparent distress.  HEENT: Head atraumatic, normocephalic. Pupils equal and round.  Neck: Neck is supple without lymphadenopathy.   Lungs: Normal effort, CTA bilaterally, no wheezes, rhonchi, or rales  CV: Tachycardic. No murmurs, rubs, or gallops.  Ext: No clubbing, cyanosis, edema.  Assessment & Plan:     30 y.o. male with the following -     1. Acute respiratory failure due to COVID-19 (HCC)  Currently on 2 L/min O2.  Await pulmonary recommendations regarding repeat imaging and COVID-19 vaccination.     2. On home oxygen therapy  See above    3. Tachycardia  Patient with persistent tachycardia.  Will check echocardiogram, TSH, Holter.  Refer to cardiology for further recommendations.  - EC-ECHOCARDIOGRAM COMPLETE W/O CONT; Future  - REFERRAL TO CARDIOLOGY  - TSH WITH REFLEX TO FT4; Future  - HOLTER - Cardiology Performed (48HR); Future    4. Hypokalemia  - Comp Metabolic Panel; Future    5. Elevated liver enzymes  Likely related to COVID-19 virus.  We will recheck.  - Comp Metabolic Panel; Future  - GAMMA GT (GGT); Future    6. Transaminitis  See above    7. Microcytosis  - FERRITIN; Future  - IRON/TOTAL IRON BIND; Future  - CBC WITH DIFFERENTIAL; Future    Return for Will notify patient to follow-up pending tests.    Melody Zuniga PA-C (Baker)  Physician Assistant Certified  [facility]    Please note that this dictation was created using voice recognition software. I " have made every reasonable attempt to correct obvious errors, but I expect that there are errors of grammar and possibly content that I did not discover before finalizing the note.

## 2021-10-04 NOTE — PATIENT INSTRUCTIONS
Once resulted, your lab/test/imaging results will show up automatically in your MyChart. Please wait for my interpretation and recommendations prior to viewing your results to avoid any unnecessary confusion or misinterpretation. I will address all of the lab values that I interpret as abnormal and message you accordingly on your MyChart. I will always send you a message on your labs even if they are normal. If you do not hear back from me within 5 business days after getting your labs drawn, then please send me a message on MyChart so I can obtain your labs (especially if you went to an outside lab - [facility], [facility], etc). If you have any additional questions or concerns beyond my interpretation of your results, please make an appointment with me to discuss in further detail.    Please only use the [employer] messaging system for questions regarding your most recent appointment or if advised to use otherwise (glucose or blood pressure reporting). If you have any new problems or concerns, you must make an appointment to discuss. This includes any referral requests.     Thank you,    Melody Zuniga PA-C (Baker)  Physician Assistant Certified  [facility]

## 2021-10-06 ENCOUNTER — TELEPHONE (OUTPATIENT)
Dept: OTHER | Facility: MEDICAL CENTER | Age: 31
End: 2021-10-06

## 2021-10-06 VITALS — OXYGEN SATURATION: 94 % | RESPIRATION RATE: 41 BRPM | HEART RATE: 136 BPM

## 2021-10-06 VITALS — HEART RATE: 142 BPM | OXYGEN SATURATION: 95 %

## 2021-10-06 NOTE — TELEPHONE ENCOUNTER
Call patient due to high heart rate reading, pt stated he's drinking some soda with his sandwich and doing fine.

## 2021-10-07 ENCOUNTER — TELEPHONE (OUTPATIENT)
Dept: OTHER | Facility: MEDICAL CENTER | Age: 31
End: 2021-10-07

## 2021-10-07 VITALS — HEART RATE: 109 BPM | RESPIRATION RATE: 35 BRPM | OXYGEN SATURATION: 96 %

## 2021-10-08 NOTE — TELEPHONE ENCOUNTER
Dr. Vaca notified [facility] that patient has graduated from the program. [facility] reached out to patient to inform him that he has graduated from the program, can remove the device and delete the vivienne from his phone.

## 2021-10-16 ENCOUNTER — HOSPITAL ENCOUNTER (OUTPATIENT)
Dept: LAB | Facility: MEDICAL CENTER | Age: 31
End: 2021-10-16
Attending: PHYSICIAN ASSISTANT
Payer: COMMERCIAL

## 2021-10-16 DIAGNOSIS — E78.1 HYPERTRIGLYCERIDEMIA: ICD-10-CM

## 2021-10-16 DIAGNOSIS — R71.8 MICROCYTOSIS: ICD-10-CM

## 2021-10-16 DIAGNOSIS — R00.0 TACHYCARDIA: ICD-10-CM

## 2021-10-16 DIAGNOSIS — R74.8 ELEVATED LIVER ENZYMES: ICD-10-CM

## 2021-10-16 DIAGNOSIS — E87.6 HYPOKALEMIA: ICD-10-CM

## 2021-10-16 LAB
ALBUMIN SERPL BCP-MCNC: 4.5 G/DL (ref 3.2–4.9)
ALBUMIN/GLOB SERPL: 1.4 G/DL
ALP SERPL-CCNC: 100 U/L (ref 30–99)
ALT SERPL-CCNC: 61 U/L (ref 2–50)
ANION GAP SERPL CALC-SCNC: 13 MMOL/L (ref 7–16)
AST SERPL-CCNC: 38 U/L (ref 12–45)
BASOPHILS # BLD AUTO: 0.6 % (ref 0–1.8)
BASOPHILS # BLD: 0.06 K/UL (ref 0–0.12)
BILIRUB SERPL-MCNC: 0.3 MG/DL (ref 0.1–1.5)
BUN SERPL-MCNC: 10 MG/DL (ref 8–22)
CALCIUM SERPL-MCNC: 9.7 MG/DL (ref 8.5–10.5)
CHLORIDE SERPL-SCNC: 102 MMOL/L (ref 96–112)
CHOLEST SERPL-MCNC: 223 MG/DL (ref 100–199)
CO2 SERPL-SCNC: 25 MMOL/L (ref 20–33)
CREAT SERPL-MCNC: 0.7 MG/DL (ref 0.5–1.4)
EOSINOPHIL # BLD AUTO: 0.38 K/UL (ref 0–0.51)
EOSINOPHIL NFR BLD: 3.8 % (ref 0–6.9)
ERYTHROCYTE [DISTWIDTH] IN BLOOD BY AUTOMATED COUNT: 46.7 FL (ref 35.9–50)
FASTING STATUS PATIENT QL REPORTED: NORMAL
FERRITIN SERPL-MCNC: 202 NG/ML (ref 22–322)
GGT SERPL-CCNC: 73 U/L (ref 7–51)
GLOBULIN SER CALC-MCNC: 3.2 G/DL (ref 1.9–3.5)
GLUCOSE SERPL-MCNC: 98 MG/DL (ref 65–99)
HCT VFR BLD AUTO: 46.9 % (ref 42–52)
HDLC SERPL-MCNC: 34 MG/DL
HGB BLD-MCNC: 14.9 G/DL (ref 14–18)
IMM GRANULOCYTES # BLD AUTO: 0.18 K/UL (ref 0–0.11)
IMM GRANULOCYTES NFR BLD AUTO: 1.8 % (ref 0–0.9)
IRON SATN MFR SERPL: 20 % (ref 15–55)
IRON SERPL-MCNC: 53 UG/DL (ref 50–180)
LDLC SERPL CALC-MCNC: 151 MG/DL
LYMPHOCYTES # BLD AUTO: 1.7 K/UL (ref 1–4.8)
LYMPHOCYTES NFR BLD: 17.2 % (ref 22–41)
MCH RBC QN AUTO: 26.1 PG (ref 27–33)
MCHC RBC AUTO-ENTMCNC: 31.8 G/DL (ref 33.7–35.3)
MCV RBC AUTO: 82.1 FL (ref 81.4–97.8)
MONOCYTES # BLD AUTO: 0.71 K/UL (ref 0–0.85)
MONOCYTES NFR BLD AUTO: 7.2 % (ref 0–13.4)
NEUTROPHILS # BLD AUTO: 6.86 K/UL (ref 1.82–7.42)
NEUTROPHILS NFR BLD: 69.4 % (ref 44–72)
NRBC # BLD AUTO: 0 K/UL
NRBC BLD-RTO: 0 /100 WBC
PLATELET # BLD AUTO: 293 K/UL (ref 164–446)
PMV BLD AUTO: 10.7 FL (ref 9–12.9)
POTASSIUM SERPL-SCNC: 4.5 MMOL/L (ref 3.6–5.5)
PROT SERPL-MCNC: 7.7 G/DL (ref 6–8.2)
RBC # BLD AUTO: 5.71 M/UL (ref 4.7–6.1)
SODIUM SERPL-SCNC: 140 MMOL/L (ref 135–145)
TIBC SERPL-MCNC: 264 UG/DL (ref 250–450)
TRIGL SERPL-MCNC: 191 MG/DL (ref 0–149)
TSH SERPL DL<=0.005 MIU/L-ACNC: 2.3 UIU/ML (ref 0.38–5.33)
UIBC SERPL-MCNC: 211 UG/DL (ref 110–370)
WBC # BLD AUTO: 9.9 K/UL (ref 4.8–10.8)

## 2021-10-16 PROCEDURE — 36415 COLL VENOUS BLD VENIPUNCTURE: CPT

## 2021-10-16 PROCEDURE — 82977 ASSAY OF GGT: CPT

## 2021-10-16 PROCEDURE — 85025 COMPLETE CBC W/AUTO DIFF WBC: CPT

## 2021-10-16 PROCEDURE — 82728 ASSAY OF FERRITIN: CPT

## 2021-10-16 PROCEDURE — 83550 IRON BINDING TEST: CPT

## 2021-10-16 PROCEDURE — 80061 LIPID PANEL: CPT

## 2021-10-16 PROCEDURE — 80053 COMPREHEN METABOLIC PANEL: CPT

## 2021-10-16 PROCEDURE — 84443 ASSAY THYROID STIM HORMONE: CPT

## 2021-10-16 PROCEDURE — 83540 ASSAY OF IRON: CPT

## 2021-10-18 ENCOUNTER — NON-PROVIDER VISIT (OUTPATIENT)
Dept: CARDIOLOGY | Facility: MEDICAL CENTER | Age: 31
End: 2021-10-18
Payer: COMMERCIAL

## 2021-10-18 ENCOUNTER — OFFICE VISIT (OUTPATIENT)
Dept: MEDICAL GROUP | Facility: IMAGING CENTER | Age: 31
End: 2021-10-18
Payer: COMMERCIAL

## 2021-10-18 ENCOUNTER — TELEPHONE (OUTPATIENT)
Dept: CARDIOLOGY | Facility: MEDICAL CENTER | Age: 31
End: 2021-10-18

## 2021-10-18 VITALS
WEIGHT: 264 LBS | DIASTOLIC BLOOD PRESSURE: 78 MMHG | TEMPERATURE: 98.9 F | SYSTOLIC BLOOD PRESSURE: 136 MMHG | OXYGEN SATURATION: 93 % | HEART RATE: 117 BPM | BODY MASS INDEX: 37.8 KG/M2 | HEIGHT: 70 IN

## 2021-10-18 DIAGNOSIS — R00.0 TACHYCARDIA: ICD-10-CM

## 2021-10-18 DIAGNOSIS — Z02.89 ENCOUNTER FOR COMPLETION OF FORM WITH PATIENT: ICD-10-CM

## 2021-10-18 DIAGNOSIS — R00.0 SINUS TACHYCARDIA: ICD-10-CM

## 2021-10-18 DIAGNOSIS — I49.3 PVC (PREMATURE VENTRICULAR CONTRACTION): ICD-10-CM

## 2021-10-18 DIAGNOSIS — U07.1 ACUTE RESPIRATORY FAILURE DUE TO COVID-19 (HCC): ICD-10-CM

## 2021-10-18 DIAGNOSIS — J96.00 ACUTE RESPIRATORY FAILURE DUE TO COVID-19 (HCC): ICD-10-CM

## 2021-10-18 DIAGNOSIS — I49.1 PREMATURE ATRIAL CONTRACTIONS: ICD-10-CM

## 2021-10-18 PROCEDURE — 99214 OFFICE O/P EST MOD 30 MIN: CPT | Performed by: PHYSICIAN ASSISTANT

## 2021-10-18 ASSESSMENT — PAIN SCALES - GENERAL: PAINLEVEL: NO PAIN

## 2021-10-18 ASSESSMENT — FIBROSIS 4 INDEX: FIB4 SCORE: 0.53

## 2021-10-18 NOTE — LETTER
[facility]  [address]  [place] [address]     October 18, 2021    Patient: Chao Pereira Jr.   YOB: 1990   Date of Visit: 10/18/2021       To Whom It May Concern:      Chao Pereira was seen and treated in our department on 10/18/2021. He was diagnosed with Covid-19 with acute respiratory failure on 9/7/21. At this time, he is outside of the window of viral replication, thus he is no longer contagious. He will qualify for Covid-19 vaccination 3 months after initial infection.      Sincerely,         Melody Zuniga P.A.-C.

## 2021-10-18 NOTE — PROGRESS NOTES
Subjective:     CC:   Chief Complaint   Patient presents with   • Paperwork       HPI:   Chao presents today to discuss returning to work.  At this time he is still on 1 L/min nasal cannula.  When he goes off of oxygen his saturations decreased down to 80%.  He is still with persistent tachycardia between 100-115 at rest.  He currently has a Holter monitor on.  Echocardiogram and cardiology evaluation is scheduled for December.  He is supposed to return to work on November 2, 2021.  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      Past Medical History:   Diagnosis Date   • Allergy      Social History     Tobacco Use   • Smoking status: Never Smoker   • Smokeless tobacco: Never Used   Vaping Use   • Vaping Use: Never used   Substance Use Topics   • Alcohol use: Yes     Comment: occasional - 1x a month   • Drug use: No     Current Outpatient Medications Ordered in Epic   Medication Sig Dispense Refill   • acetaminophen (TYLENOL) 500 MG Tab Take 1 Tablet by mouth every four hours as needed for Mild Pain or Moderate Pain.     • Zinc 50 MG Tab Take 50 mg by mouth every day.     • vitamin D3 (CHOLECALCIFEROL) 1000 Unit (25 mcg) Tab Take 1,000 Units by mouth every day.     • albuterol 108 (90 Base) MCG/ACT Aero Soln inhalation aerosol Inhale 2 Puffs every 6 hours as needed for Shortness of Breath. 8.5 g 1   • cyclobenzaprine (FLEXERIL) 10 mg Tab Take 1 Tablet by mouth 2 times a day as needed for Muscle Spasms. (Patient not taking: Reported on 10/18/2021) 30 Tablet 0     No current Epic-ordered facility-administered medications on file.     Patient has no known allergies.    Health Maintenance: Would recommend COVID-19 vaccination once off oxygen or within the next 2 months.    ROS:  Gen: no fevers/chills  Pulm: + sob, no cough  CV: no chest pain, no palpitations, no edema  GI: no nausea/vomiting, no diarrhea  Skin: no rash, no lesions  Neuro: no headaches, no  "numbness/tingling  Heme/Lymph: no easy bruising or bleeding    Objective:   Exam:  /78 (BP Location: Left arm, Patient Position: Sitting, BP Cuff Size: Adult)   Pulse (!) 117   Temp 37.2 °C (98.9 °F) (Temporal)   Ht 1.778 m (5' 10\")   Wt 120 kg (264 lb)   SpO2 93% Comment: pt on 1 liter oxygen  BMI 37.88 kg/m²    Body mass index is 37.88 kg/m².    Gen: Alert and oriented, No apparent distress.  HEENT: Head atraumatic, normocephalic. Pupils equal and round.  Neck: Neck is supple without lymphadenopathy.   Lungs: Normal effort, CTA bilaterally, no wheezes, rhonchi, or rales  CV: Regular rate and rhythm. No murmurs, rubs, or gallops.  Ext: No clubbing, cyanosis, edema.    Assessment & Plan:     30 y.o. male with the following -     1. Encounter for completion of form with patient  Work note given to patient to extend FMLA to December 18, 2021.  At this time patient is currently on oxygen and it would not be appropriate for him to return to work.  We will await cardiology evaluation.  He has a pending Holter and echocardiogram.    2. Acute respiratory failure due to COVID-19 (HCC)  - DX-CHEST-2 VIEWS; Future    3. Tachycardia  Await Holter and echo.  Avoid caffeine and stimulants. Check D-dimer.  If it persists or if he develops worsening shortness of breath or chest pain would recommend patient go to the emergency department for further imaging/CT scan.    Labs reviewed with patient.  Still with mildly elevated liver function tests.  Recheck in 1 month.    Return in about 4 weeks (around 11/15/2021) for Follow-up.    Melody Zuniga PA-C (Baker)  Physician Assistant Certified  [facility]    Please note that this dictation was created using voice recognition software. I have made every reasonable attempt to correct obvious errors, but I expect that there are errors of grammar and possibly content that I did not discover before finalizing the note.  "

## 2021-10-18 NOTE — LETTER
October 18, 2021      To Whom It May Concern:           This is confirmation that Chao Santoyonathan Randall Prescott attended his scheduled appointment with Melody Zuniga P.A.-C. on 10/18/21. Due to patient's current medical condition, I would recommend extension of his FMLA until 12/18/21. He has pending appointments and tests with specialists over the next two months and is still on oxygen. It would not be appropriate for him to return to work, even with restrictions at this time.           If you have any questions please do not hesitate to call me at the phone number listed below.    Sincerely,        Melody Zuniga P.A.-C.  [phone]

## 2021-10-21 ENCOUNTER — APPOINTMENT (OUTPATIENT)
Dept: URGENT CARE | Facility: CLINIC | Age: 31
End: 2021-10-21
Payer: COMMERCIAL

## 2021-10-21 ENCOUNTER — APPOINTMENT (OUTPATIENT)
Dept: RADIOLOGY | Facility: IMAGING CENTER | Age: 31
End: 2021-10-21
Attending: PHYSICIAN ASSISTANT
Payer: COMMERCIAL

## 2021-10-21 ENCOUNTER — HOSPITAL ENCOUNTER (OUTPATIENT)
Dept: LAB | Facility: MEDICAL CENTER | Age: 31
End: 2021-10-21
Attending: PHYSICIAN ASSISTANT
Payer: COMMERCIAL

## 2021-10-21 DIAGNOSIS — J96.00 ACUTE RESPIRATORY FAILURE DUE TO COVID-19 (HCC): ICD-10-CM

## 2021-10-21 DIAGNOSIS — R00.0 TACHYCARDIA: ICD-10-CM

## 2021-10-21 DIAGNOSIS — U07.1 ACUTE RESPIRATORY FAILURE DUE TO COVID-19 (HCC): ICD-10-CM

## 2021-10-21 LAB — D DIMER PPP IA.FEU-MCNC: <0.27 UG/ML (FEU) (ref 0–0.5)

## 2021-10-21 PROCEDURE — 85379 FIBRIN DEGRADATION QUANT: CPT

## 2021-10-21 PROCEDURE — 71046 X-RAY EXAM CHEST 2 VIEWS: CPT | Mod: TC,CS | Performed by: NURSE PRACTITIONER

## 2021-10-21 PROCEDURE — 36415 COLL VENOUS BLD VENIPUNCTURE: CPT

## 2021-10-22 LAB — EKG IMPRESSION: NORMAL

## 2021-10-22 PROCEDURE — 93224 XTRNL ECG REC UP TO 48 HRS: CPT | Performed by: INTERNAL MEDICINE

## 2021-12-01 ENCOUNTER — OFFICE VISIT (OUTPATIENT)
Dept: SLEEP MEDICINE | Facility: MEDICAL CENTER | Age: 31
End: 2021-12-01
Payer: COMMERCIAL

## 2021-12-01 VITALS
WEIGHT: 276.56 LBS | HEART RATE: 111 BPM | HEIGHT: 70 IN | SYSTOLIC BLOOD PRESSURE: 142 MMHG | OXYGEN SATURATION: 97 % | BODY MASS INDEX: 39.59 KG/M2 | DIASTOLIC BLOOD PRESSURE: 90 MMHG

## 2021-12-01 DIAGNOSIS — U07.1 PNEUMONIA DUE TO COVID-19 VIRUS: ICD-10-CM

## 2021-12-01 DIAGNOSIS — R09.82 POST-NASAL DRIP: ICD-10-CM

## 2021-12-01 DIAGNOSIS — J12.82 PNEUMONIA DUE TO COVID-19 VIRUS: ICD-10-CM

## 2021-12-01 PROCEDURE — 99215 OFFICE O/P EST HI 40 MIN: CPT | Mod: 25 | Performed by: STUDENT IN AN ORGANIZED HEALTH CARE EDUCATION/TRAINING PROGRAM

## 2021-12-01 PROCEDURE — 94761 N-INVAS EAR/PLS OXIMETRY MLT: CPT | Performed by: STUDENT IN AN ORGANIZED HEALTH CARE EDUCATION/TRAINING PROGRAM

## 2021-12-01 RX ORDER — FLUTICASONE PROPIONATE 50 MCG
1-2 SPRAY, SUSPENSION (ML) NASAL DAILY
Qty: 16 G | Refills: 6 | Status: SHIPPED | OUTPATIENT
Start: 2021-12-01 | End: 2022-09-08 | Stop reason: SDUPTHER

## 2021-12-01 ASSESSMENT — ENCOUNTER SYMPTOMS
DYSPNEA AT REST: 0
HEMOPTYSIS: 0
SHORTNESS OF BREATH: 1
CHEST TIGHTNESS: 0
FEVER: 0
WEIGHT LOSS: 0
RESPIRATORY SYMPTOMS COMMENTS: YES
COUGH: 0
SPUTUM PRODUCTION: 0
SHORTNESS OF BREATH: 0
CHILLS: 0
WHEEZING: 0

## 2021-12-01 ASSESSMENT — FIBROSIS 4 INDEX: FIB4 SCORE: 0.53

## 2021-12-01 NOTE — ASSESSMENT & PLAN NOTE
Significantly improved - activity level almost back to baseline. Has been off O2 for about 1 month - no significant desaturation in clinic today on multiox. D-dimer neg (got it done recently for some lumps felt on RUE but those are now gone too). Still has some tachycadia to 100-110s at rest and up to 120s with exertion likely 2/2 caffeine use    - f/u TTE as ordered  - f/u with cardiology - has appt next week  - no need for supplemental O2   - encourage regular exercise/activity and decreasing caffeine use

## 2021-12-01 NOTE — PROCEDURES
Multi-Ox Readings  Multi Ox #1 Room air   O2 sat % at rest 97 (Pulse: 111)   O2 sat % on exertion 94 (pulse: 125)   O2 sat average on exertion     Multi Ox #2     O2 sat % at rest     O2 sat % on exertion     O2 sat average on exertion       Oxygen Use     Oxygen Frequency     Duration of need     Is the patient mobile within the home?     CPAP Use?     BIPAP Use?     Servo Titration

## 2021-12-01 NOTE — PROGRESS NOTES
Pulmonary Clinic Note    Chief Complaint:  Chief Complaint   Patient presents with   • Establish Care     Referred by Dr Vaca for Acute respiratory failure due to COVID-19. CRC/RMP   • Other     CXR 10/21/1     HPI:   Chao Pereira Jr. is a very pleasant 30 y.o. male, non smoker, with recent COVID who presents to pulmonary clinic for COVID f/u.     Patient was hospitalized from 9/13-9/20/21 with COVID pneumonia (unvaccinated) - on decadron and dc home on 2L O2. He stopped using supplemental O2 about one month ago. Has been increasing activity and returning to baseline. Still drinking multiple energy drinks so thinks this has been contributing to his tachycardia (even before he got COVID) - he's trying to cut back.       Past Medical History:   Diagnosis Date   • Allergy    • Cough    • Morning headache    • Shortness of breath    • Sputum production    • Wears glasses        Past Surgical History:   Procedure Laterality Date   • OTHER  2005    triple arthrodesis   • VASECTOMY         Social History     Socioeconomic History   • Marital status:      Spouse name: Not on file   • Number of children: Not on file   • Years of education: Not on file   • Highest education level: Not on file   Occupational History   • Occupation: Cardiac ICU - tech   Tobacco Use   • Smoking status: Never Smoker   • Smokeless tobacco: Never Used   Vaping Use   • Vaping Use: Never used   Substance and Sexual Activity   • Alcohol use: Not Currently     Alcohol/week: 0.0 oz     Comment: Less than 1/month   • Drug use: Never   • Sexual activity: Yes     Partners: Female     Comment: has 3 kids   Other Topics Concern   • Not on file   Social History Narrative   • Not on file     Social Determinants of Health     Financial Resource Strain:    • Difficulty of Paying Living Expenses: Not on file   Food Insecurity:    • Worried About Running Out of Food in the Last Year: Not on file   • Ran Out of Food in the Last Year: Not on file    Transportation Needs:    • Lack of Transportation (Medical): Not on file   • Lack of Transportation (Non-Medical): Not on file   Physical Activity:    • Days of Exercise per Week: Not on file   • Minutes of Exercise per Session: Not on file   Stress:    • Feeling of Stress : Not on file   Social Connections:    • Frequency of Communication with Friends and Family: Not on file   • Frequency of Social Gatherings with Friends and Family: Not on file   • Attends Shinto Services: Not on file   • Active Member of Clubs or Organizations: Not on file   • Attends Club or Organization Meetings: Not on file   • Marital Status: Not on file   Intimate Partner Violence:    • Fear of Current or Ex-Partner: Not on file   • Emotionally Abused: Not on file   • Physically Abused: Not on file   • Sexually Abused: Not on file   Housing Stability:    • Unable to Pay for Housing in the Last Year: Not on file   • Number of Places Lived in the Last Year: Not on file   • Unstable Housing in the Last Year: Not on file          Family History   Problem Relation Age of Onset   • Hypertension Father    • Cancer Neg Hx    • Diabetes Neg Hx    • Heart Disease Neg Hx    • Stroke Neg Hx        Current Outpatient Medications on File Prior to Visit   Medication Sig Dispense Refill   • acetaminophen (TYLENOL) 500 MG Tab Take 1 Tablet by mouth every four hours as needed for Mild Pain or Moderate Pain.     • cyclobenzaprine (FLEXERIL) 10 mg Tab Take 1 Tablet by mouth 2 times a day as needed for Muscle Spasms. 30 Tablet 0     No current facility-administered medications on file prior to visit.       Allergies: Patient has no known allergies.      ROS:   Review of Systems   Constitutional: Negative for chills, fever, malaise/fatigue and weight loss.   HENT: Negative for congestion.    Respiratory: Negative for cough, hemoptysis, sputum production, shortness of breath and wheezing.    Cardiovascular: Negative for chest pain.   All other systems  "reviewed and are negative.      Vitals:  /90 (BP Location: Left arm, Patient Position: Sitting, BP Cuff Size: Adult)   Pulse (!) 111   Ht 1.778 m (5' 10\")   Wt (!) 125 kg (276 lb 9 oz)   SpO2 97%     Physical Exam:  Physical Exam  Vitals and nursing note reviewed.   Constitutional:       General: He is not in acute distress.     Appearance: Normal appearance. He is not ill-appearing or toxic-appearing.   HENT:      Head: Normocephalic and atraumatic.      Nose: Nose normal.      Mouth/Throat:      Mouth: Mucous membranes are moist.   Eyes:      General: No scleral icterus.     Conjunctiva/sclera: Conjunctivae normal.   Cardiovascular:      Rate and Rhythm: Normal rate and regular rhythm.   Pulmonary:      Effort: Pulmonary effort is normal. No respiratory distress.      Breath sounds: No wheezing, rhonchi or rales.   Abdominal:      Palpations: Abdomen is soft.   Musculoskeletal:         General: No deformity or signs of injury. Normal range of motion.      Cervical back: Normal range of motion.   Skin:     General: Skin is warm and dry.   Neurological:      General: No focal deficit present.      Mental Status: He is alert. Mental status is at baseline.   Psychiatric:         Mood and Affect: Mood normal.         Behavior: Behavior normal.         Data:  CXR 10/21/21  1.  Significant interval improvement in patient's previously identified Covid pneumonitis.    Assessment/Plan:    Problem List Items Addressed This Visit     Pneumonia due to COVID-19 virus     Significantly improved - activity level almost back to baseline. Has been off O2 for about 1 month - no significant desaturation in clinic today on multiox. D-dimer neg (got it done recently for some lumps felt on RUE but those are now gone too). Still has some tachycadia to 100-110s at rest and up to 120s with exertion likely 2/2 caffeine use    - f/u TTE as ordered  - f/u with cardiology - has appt next week  - no need for supplemental O2   - " encourage regular exercise/activity and decreasing caffeine use         Relevant Medications    fluticasone (FLONASE) 50 MCG/ACT nasal spray    Other Relevant Orders    Multiple Oximetry    Post-nasal drip     Flonase, saline nasal spray and sinus rinses prn         Relevant Medications    fluticasone (FLONASE) 50 MCG/ACT nasal spray          Return if symptoms worsen or fail to improve.       Time spent in record review prior to patient arrival, reviewing results, and in face-to-face encounter totaled 45 min, excluding any procedures if performed.    Christiana Gutierrez MD  Pulmonary and Critical Care Medicine  [facility]

## 2021-12-03 ENCOUNTER — OFFICE VISIT (OUTPATIENT)
Dept: MEDICAL GROUP | Facility: IMAGING CENTER | Age: 31
End: 2021-12-03
Payer: COMMERCIAL

## 2021-12-03 ENCOUNTER — HOSPITAL ENCOUNTER (OUTPATIENT)
Dept: CARDIOLOGY | Facility: MEDICAL CENTER | Age: 31
End: 2021-12-03
Attending: PHYSICIAN ASSISTANT
Payer: COMMERCIAL

## 2021-12-03 VITALS
BODY MASS INDEX: 39.65 KG/M2 | HEART RATE: 115 BPM | DIASTOLIC BLOOD PRESSURE: 72 MMHG | SYSTOLIC BLOOD PRESSURE: 134 MMHG | HEIGHT: 70 IN | TEMPERATURE: 97.3 F | WEIGHT: 277 LBS | OXYGEN SATURATION: 96 %

## 2021-12-03 DIAGNOSIS — I51.7 LVH (LEFT VENTRICULAR HYPERTROPHY): ICD-10-CM

## 2021-12-03 DIAGNOSIS — R00.0 TACHYCARDIA: ICD-10-CM

## 2021-12-03 DIAGNOSIS — Z86.16 HISTORY OF COVID-19: ICD-10-CM

## 2021-12-03 DIAGNOSIS — Z76.89 RETURN TO WORK EVALUATION: ICD-10-CM

## 2021-12-03 DIAGNOSIS — Z23 NEED FOR VACCINATION: ICD-10-CM

## 2021-12-03 PROBLEM — U07.1 ACUTE RESPIRATORY FAILURE DUE TO COVID-19 (HCC): Status: RESOLVED | Noted: 2021-09-10 | Resolved: 2021-12-03

## 2021-12-03 PROBLEM — Z99.81 ON HOME OXYGEN THERAPY: Status: RESOLVED | Noted: 2021-10-04 | Resolved: 2021-12-03

## 2021-12-03 PROBLEM — U07.1 PNEUMONIA DUE TO COVID-19 VIRUS: Status: RESOLVED | Noted: 2021-12-01 | Resolved: 2021-12-03

## 2021-12-03 PROBLEM — J12.82 PNEUMONIA DUE TO COVID-19 VIRUS: Status: RESOLVED | Noted: 2021-12-01 | Resolved: 2021-12-03

## 2021-12-03 PROBLEM — J96.00 ACUTE RESPIRATORY FAILURE DUE TO COVID-19 (HCC): Status: RESOLVED | Noted: 2021-09-10 | Resolved: 2021-12-03

## 2021-12-03 LAB
LV EJECT FRACT  99904: 70
LV EJECT FRACT MOD 2C 99903: 74.51
LV EJECT FRACT MOD 4C 99902: 67.28
LV EJECT FRACT MOD BP 99901: 69.73

## 2021-12-03 PROCEDURE — 93306 TTE W/DOPPLER COMPLETE: CPT | Mod: 26 | Performed by: INTERNAL MEDICINE

## 2021-12-03 PROCEDURE — 90471 IMMUNIZATION ADMIN: CPT | Performed by: INTERNAL MEDICINE

## 2021-12-03 PROCEDURE — 90686 IIV4 VACC NO PRSV 0.5 ML IM: CPT | Performed by: INTERNAL MEDICINE

## 2021-12-03 PROCEDURE — 99214 OFFICE O/P EST MOD 30 MIN: CPT | Mod: 25 | Performed by: PHYSICIAN ASSISTANT

## 2021-12-03 PROCEDURE — 93306 TTE W/DOPPLER COMPLETE: CPT

## 2021-12-03 ASSESSMENT — PAIN SCALES - GENERAL: PAINLEVEL: NO PAIN

## 2021-12-03 ASSESSMENT — FIBROSIS 4 INDEX: FIB4 SCORE: 0.53

## 2021-12-03 NOTE — ASSESSMENT & PLAN NOTE
Patient with persistent tachycardia.  He denies any shortness of breath.  He states that he has been try to limit his caffeine intake and now drinks only 2 red bulls a day (around 75 mg of caffeine) as compared to previously around 4.  Echocardiogram shows normal ejection fraction and evidence of LVH.

## 2021-12-03 NOTE — ASSESSMENT & PLAN NOTE
Patient with evidence of LVH on echocardiogram.  Normal ejection fraction.  No history of essential hypertension diagnosis.  Does not check his blood pressure at home.  No leg swelling.  No current shortness of breath.  Had previously been on oxygen due to recent COVID-19 infection.

## 2021-12-03 NOTE — LETTER
December 3, 2021    To Whom It May Concern:           This is confirmation that Chao Pereira Jr. attended his scheduled appointment with Melody Zuniga P.A.-C. on 12/03/21. 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           If you have any questions please do not hesitate to call me at the phone number listed below.      Sincerely,        Melody Zuniga P.A.-C.  [phone]

## 2021-12-03 NOTE — PROGRESS NOTES
Subjective:     CC:   Chief Complaint   Patient presents with   • Letter for School/Work     back to work visit - cleared for work       HPI:   Chao presents today to discuss:    Tachycardia  Patient with persistent tachycardia.  He denies any shortness of breath.  He states that he has been try to limit his caffeine intake and now drinks only 2 red bulls a day (around 75 mg of caffeine) as compared to previously around 4.  Echocardiogram shows normal ejection fraction and evidence of LVH.    LVH (left ventricular hypertrophy)  Patient with evidence of LVH on echocardiogram.  Normal ejection fraction.  No history of essential hypertension diagnosis.  Does not check his blood pressure at home.  No leg swelling.  No current shortness of breath.  Had previously been on oxygen due to recent COVID-19 infection.      Past Medical History:   Diagnosis Date   • Acute respiratory failure due to COVID-19 (HCC) 9/10/2021   • Allergy    • Cough    • Morning headache    • Pneumonia due to COVID-19 virus 12/1/2021   • Shortness of breath    • Sputum production    • Wears glasses      Social History     Tobacco Use   • Smoking status: Never Smoker   • Smokeless tobacco: Never Used   Vaping Use   • Vaping Use: Never used   Substance Use Topics   • Alcohol use: Not Currently     Alcohol/week: 0.0 oz     Comment: Less than 1/month   • Drug use: Never     Current Outpatient Medications Ordered in Epic   Medication Sig Dispense Refill   • fluticasone (FLONASE) 50 MCG/ACT nasal spray Administer 1-2 Sprays into affected nostril(S) every day. Each nostril. 16 g 6   • acetaminophen (TYLENOL) 500 MG Tab Take 1 Tablet by mouth every four hours as needed for Mild Pain or Moderate Pain.       No current Epic-ordered facility-administered medications on file.     Patient has no known allergies.    Health Maintenance: Flu shot today    ROS: see hpi  Gen: no fevers/chills  Pulm: no sob, no cough  CV: no chest pain, no palpitations, no  "edema  GI: no nausea/vomiting, no diarrhea  Skin: no rash    Objective:   Exam:  /72 (BP Location: Left arm, Patient Position: Sitting, BP Cuff Size: Adult)   Pulse (!) 115   Temp 36.3 °C (97.3 °F) (Temporal)   Ht 1.778 m (5' 10\")   Wt (!) 126 kg (277 lb)   SpO2 96%   BMI 39.75 kg/m²    Body mass index is 39.75 kg/m².    Gen: Alert and oriented, No apparent distress.  HEENT: Head atraumatic, normocephalic. Pupils equal and round.  Neck: Neck is supple without lymphadenopathy.   Lungs: Normal effort, CTA bilaterally, no wheezes, rhonchi, or rales  CV: Tachycardic rate and regular rhythm. No murmurs, rubs, or gallops.  ABD: +BS. Non-tender, non-distended. No rebound, rigidity, or guarding.  Ext: No clubbing, cyanosis, edema.    Assessment & Plan:     30 y.o. male with the following -     1. Return to work evaluation  Patient here for work clearance to return to work.  Works as an ICU tech.  Note written to return to work on 12/18/2021 on light duty restrictions for 1 month then full duty starting January 18, 2021.    2. History of COVID-19  Patient with history of acute respiratory failure due to Covid pneumonia with concomitant transaminitis.  Symptoms have improved.  He recently saw pulmonology and those notes were reviewed.  Now off oxygen.  Plans on getting the [employer] vaccination within the next 2 weeks.    3. LVH (left ventricular hypertrophy)  Noted on echocardiogram -reviewed results with patient, no known diagnosis of hypertension.  Has an appointment with cardiology December 8, 2021.  Discussed with patient the possibility of 24-hour blood pressure monitoring and future ACE inhibitor.  We will leave per discretion of cardiology.    4. Tachycardia  Discussed reducing caffeine intake as this is likely contributing to his tachycardia.  Await cardiology evaluation.    5. Need for vaccination  - INFLUENZA VACCINE QUAD INJ (PF)    Return in about 2 months (around 2/3/2022) for lab " carmen.    Melody Zuniga PA-C (Baker)  Physician Assistant Certified  [facility]    Please note that this dictation was created using voice recognition software. I have made every reasonable attempt to correct obvious errors, but I expect that there are errors of grammar and possibly content that I did not discover before finalizing the note.

## 2021-12-06 ENCOUNTER — TELEPHONE (OUTPATIENT)
Dept: CARDIOLOGY | Facility: MEDICAL CENTER | Age: 31
End: 2021-12-06

## 2021-12-06 NOTE — TELEPHONE ENCOUNTER
Pt returning call and states that he has never seen a cardiologist prior, no Labs, EKGs or other cardiac testing done outside of [facility]. Please call pt back if you have any further questions or concerns at [phone].    Thank you

## 2021-12-06 NOTE — TELEPHONE ENCOUNTER
Chart Prep  LVM for Pt to CB in regards to requesting records for NP appt with Dr. Oliver. Primarily seeking records from prior cardiologist, prior recent cardiac testing/imaging and most recent labs. LVM for Pt to CB @[phone].

## 2021-12-08 ENCOUNTER — OFFICE VISIT (OUTPATIENT)
Dept: CARDIOLOGY | Facility: MEDICAL CENTER | Age: 31
End: 2021-12-08
Payer: COMMERCIAL

## 2021-12-08 VITALS
WEIGHT: 276 LBS | HEIGHT: 70 IN | BODY MASS INDEX: 39.51 KG/M2 | OXYGEN SATURATION: 95 % | HEART RATE: 102 BPM | RESPIRATION RATE: 16 BRPM | DIASTOLIC BLOOD PRESSURE: 76 MMHG | SYSTOLIC BLOOD PRESSURE: 122 MMHG

## 2021-12-08 DIAGNOSIS — Z86.16 HISTORY OF COVID-19: ICD-10-CM

## 2021-12-08 DIAGNOSIS — E78.1 HYPERTRIGLYCERIDEMIA: ICD-10-CM

## 2021-12-08 DIAGNOSIS — E78.5 DYSLIPIDEMIA: Chronic | ICD-10-CM

## 2021-12-08 PROBLEM — I51.7 LVH (LEFT VENTRICULAR HYPERTROPHY): Status: RESOLVED | Noted: 2021-12-03 | Resolved: 2021-12-08

## 2021-12-08 PROBLEM — R00.0 TACHYCARDIA: Status: RESOLVED | Noted: 2021-09-13 | Resolved: 2021-12-08

## 2021-12-08 PROCEDURE — 99244 OFF/OP CNSLTJ NEW/EST MOD 40: CPT | Performed by: INTERNAL MEDICINE

## 2021-12-08 ASSESSMENT — ENCOUNTER SYMPTOMS
PND: 0
HEADACHES: 1
FEVER: 0
PALPITATIONS: 0
DIZZINESS: 0
SPUTUM PRODUCTION: 1
COUGH: 0
FOCAL WEAKNESS: 0
ABDOMINAL PAIN: 0
CHILLS: 0
SHORTNESS OF BREATH: 1
NAUSEA: 0
SORE THROAT: 0
BRUISES/BLEEDS EASILY: 0
BLURRED VISION: 0
CLAUDICATION: 0
WEAKNESS: 0
FALLS: 0

## 2021-12-08 ASSESSMENT — FIBROSIS 4 INDEX: FIB4 SCORE: 0.55

## 2021-12-08 NOTE — PATIENT INSTRUCTIONS
Work on at least 1.5 - 5 hours a week of moderate exercise (typical brisk walking or similar activity)    If you have had a heart attack, stent, bypass or reduced heart strength (EF <35%): cardiac rehab may reduce your risk of dying by 13-24% and need to go to the hospital by 30% within the first year (1)    Please look into the following diets and incorporate them into your diet    LOW SALT DIET   KEEP YOUR SODIUM EQUAL TO CALORIES AND NO MORE THAN DOUBLE THE CALORIES FOR A LOW SALT DIET    Cardiosmart.org - great resource for American College of Cardiology on heart disease prevention and treatment    FOR TREATMENT OR PREVENTION OF CORONARY ARTERY DISEASE  These three programs are approved by Medicare/Insurers for those with heart disease  [facility] - [facility]  Dr. Schaefer's Program for Reversing Heart Disease - [facility]'s Cardiologist vegetarian-based  [facility] - [place]-based mind-body Program    This is a commonly referenced Program  Dr Newberry - [place] over [place] (book and documentary) - vegetarian-based    FOR TREATMENT OF BLOOD PRESSURE  DASH DIET - American Heart Association for treatment of HYPERTENSION    FOR TREATMENT OF BAD CHOLESTEROL/FATS  REDUCE PROCESSED SUGAR AS MUCH AS POSSIBLE  INCREASE WHOLE GRAINS/VEGETABLES  INCREASE FIBER    Lowering total cholesterol and LDL (bad) cholesterol:  - Eat leaner cuts of meat, or eliminate altogether if possible red meat, and frequently substitute fish or chicken.  - Limit saturated fat to no more than 7-10% of total calories no more than 10 g per day is recommended. Some sources of saturated fat include butter, animal fats, hydrogenated vegetable fats and oils, many desserts, whole milk dairy products.  - Replaced saturated fats with polyunsaturated fats and monounsaturated fats. Foods high in monounsaturated fat include nuts, canola oil, avocados, and olives.  - Limit trans fat (processed foods)  and replaced with fresh fruits and vegetables  - Recommend nonfat dairy products  - Increase substantially the amount of soluble fiber intake (legumes such as beans, fruit, whole grains).  - Consider nutritional supplements: plant sterile spreads such as Benecol, fish oil,  flaxseed oil, omega-3 acids capsules 1000 mg twice a day, or viscous fiber such as Metamucil  - Attain ideal weight and regular exercise (at least 30 minutes per day of moderate exercise)  ASK ABOUT STATIN OR NON STATIN MEDICATION TO REDUCE YOUR LDL AND HEART RISK    Lowering triglycerides:  - Reduce intake of simple sugar: Desserts, candy, pastries, honey, sodas, sugared cereals, yogurt, Gatorade, sports bars, canned fruit, smoothies, fruit juice, coffee drinks  - Reduced intake of refined starches: Refined Pasta, most bread  - Reduce or abstain from alcohol  - Increase omega-3 fatty acids: Baxter, Trout, Mackerel, Herring, Albacore tuna and supplements  - Attain ideal weight and regular exercise (at least 30 minutes per day of moderate exercise)  ASK ABOUT PURIFIED OMEGA 3 EPA or FISH OIL TO REDUCE YOUR TG AND HEART RISK    Elevating HDL (good) cholesterol:  - Increase physical activity  - Increase omega-3 fatty acids and supplements as listed above  - Incorporating appropriate amounts of monounsaturated fats such as nuts, olive oil, canola oil, avocados, olives  - Stop smoking  - Attain ideal weight and regular exercise (at least 30 minutes per day of moderate exercise)

## 2021-12-08 NOTE — PROGRESS NOTES
Chief Complaint   Patient presents with   • Tachycardia       Subjective     Chao Pereira Jr. is a 31 y.o. male who presents today in consultation from Melody Zuniga P.A.-C.  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 and unfortunately had Covid which was quite severe and [employer] intubation tachycardia in the recovery phase he had a Holter monitor for normal range besides mild tachycardia echocardiogram on Friday showed periods of tachycardia echo was read as having hypertrophy not present on my review    Past Medical History:   Diagnosis Date   • Acute respiratory failure due to COVID-19 (HCC) 9/10/2021   • Allergy    • Cough    • Dyslipidemia    • Morning headache    • Pneumonia due to COVID-19 virus 12/1/2021   • Shortness of breath    • Sputum production    • Wears glasses      Past Surgical History:   Procedure Laterality Date   • OTHER  2005    triple arthrodesis   • VASECTOMY       Family History   Problem Relation Age of Onset   • Hypertension Father    • Cancer Neg Hx    • Diabetes Neg Hx    • Heart Disease Neg Hx    • Stroke Neg Hx      Social History     Socioeconomic History   • Marital status:      Spouse name: Not on file   • Number of children: Not on file   • Years of education: Not on file   • Highest education level: Not on file   Occupational History   • Occupation: Cardiac ICU - tech   Tobacco Use   • Smoking status: Never Smoker   • Smokeless tobacco: Never Used   Vaping Use   • Vaping Use: Never used   Substance and Sexual Activity   • Alcohol use: Not Currently     Alcohol/week: 0.0 oz     Comment: Less than 1/month   • Drug use: Never   • Sexual activity: Yes     Partners: Female     Comment: has 3 kids   Other Topics Concern   • Not on file   Social History Narrative   • Not on file     Social Determinants of Health     Financial Resource Strain:    • Difficulty of Paying Living Expenses: Not on file   Food Insecurity:    •  Worried About Running Out of Food in the Last Year: Not on file   • Ran Out of Food in the Last Year: Not on file   Transportation Needs:    • Lack of Transportation (Medical): Not on file   • Lack of Transportation (Non-Medical): Not on file   Physical Activity:    • Days of Exercise per Week: Not on file   • Minutes of Exercise per Session: Not on file   Stress:    • Feeling of Stress : Not on file   Social Connections:    • Frequency of Communication with Friends and Family: Not on file   • Frequency of Social Gatherings with Friends and Family: Not on file   • Attends Spiritism Services: Not on file   • Active Member of Clubs or Organizations: Not on file   • Attends Club or Organization Meetings: Not on file   • Marital Status: Not on file   Intimate Partner Violence:    • Fear of Current or Ex-Partner: Not on file   • Emotionally Abused: Not on file   • Physically Abused: Not on file   • Sexually Abused: Not on file   Housing Stability:    • Unable to Pay for Housing in the Last Year: Not on file   • Number of Places Lived in the Last Year: Not on file   • Unstable Housing in the Last Year: Not on file     No Known Allergies  Outpatient Encounter Medications as of 12/8/2021   Medication Sig Dispense Refill   • fluticasone (FLONASE) 50 MCG/ACT nasal spray Administer 1-2 Sprays into affected nostril(S) every day. Each nostril. 16 g 6   • acetaminophen (TYLENOL) 500 MG Tab Take 1 Tablet by mouth every four hours as needed for Mild Pain or Moderate Pain.       No facility-administered encounter medications on file as of 12/8/2021.     Review of Systems   Constitutional: Negative for chills and fever.   HENT: Negative for sore throat.    Eyes: Negative for blurred vision.   Respiratory: Positive for sputum production and shortness of breath. Negative for cough.    Cardiovascular: Negative for chest pain, palpitations, claudication, leg swelling and PND.   Gastrointestinal: Negative for abdominal pain and nausea.  "  Musculoskeletal: Negative for falls and joint pain.   Skin: Negative for rash.   Neurological: Positive for headaches. Negative for dizziness, focal weakness and weakness.   Endo/Heme/Allergies: Does not bruise/bleed easily.              Objective     /76 (BP Location: Left arm, Patient Position: Sitting, BP Cuff Size: Adult)   Pulse (!) 102   Resp 16   Ht 1.778 m (5' 10\")   Wt (!) 125 kg (276 lb)   SpO2 95%   BMI 39.60 kg/m²     Physical Exam  Constitutional:       General: He is not in acute distress.     Appearance: He is not diaphoretic.   Eyes:      General: No scleral icterus.  Neck:      Vascular: No JVD.   Cardiovascular:      Rate and Rhythm: Normal rate.      Heart sounds: Normal heart sounds. No murmur heard.  No friction rub. No gallop.    Pulmonary:      Effort: No respiratory distress.      Breath sounds: No wheezing or rales.   Abdominal:      General: Bowel sounds are normal.      Palpations: Abdomen is soft.   Skin:     Findings: No rash.   Neurological:      Mental Status: He is alert.            Reviewed his labs he does have dyslipidemia LDL and low HDL    Assessment & Plan     1. Dyslipidemia     2. BMI 39.0-39.9,adult     3. History of COVID-19     4. Hypertriglyceridemia         Medical Decision Making: Today's Assessment/Status/Plan:        It was my pleasure to meet with Mr. Pereira.    Blood pressure is well controlled.  He will continue to monitor and eat hearty healthy diet.    Strongly reinforced importance of heart healthy diet for his mild dyslipidemia    His cardiac testing is to have LVH on his echocardiogram    We discussed the importance of meaningful weight loss.    Mr. Pereira does not require regular cardiology follow up, I have advised him to call our office or e-mail using my MyChart if needed.    It is my pleasure to participate in the care of Mr. Pereira.  Please do not hesitate to contact me with questions or concerns.    Salvador Oliver MD PhD FACC  Cardiologist " [facility]    Please note that this dictation was created using voice recognition software. There may be errors I did not discover before finalizing the note.

## 2021-12-09 ENCOUNTER — APPOINTMENT (OUTPATIENT)
Dept: CARDIOLOGY | Facility: MEDICAL CENTER | Age: 31
End: 2021-12-09
Attending: PHYSICIAN ASSISTANT
Payer: COMMERCIAL

## 2022-03-31 DIAGNOSIS — Z13.21 ENCOUNTER FOR VITAMIN DEFICIENCY SCREENING: ICD-10-CM

## 2022-03-31 DIAGNOSIS — E78.5 DYSLIPIDEMIA: ICD-10-CM

## 2022-03-31 DIAGNOSIS — E78.1 HYPERTRIGLYCERIDEMIA: ICD-10-CM

## 2022-03-31 DIAGNOSIS — R74.01 TRANSAMINITIS: ICD-10-CM

## 2022-04-09 ENCOUNTER — HOSPITAL ENCOUNTER (OUTPATIENT)
Dept: LAB | Facility: MEDICAL CENTER | Age: 32
End: 2022-04-09
Attending: PHYSICIAN ASSISTANT
Payer: COMMERCIAL

## 2022-04-09 DIAGNOSIS — Z13.21 ENCOUNTER FOR VITAMIN DEFICIENCY SCREENING: ICD-10-CM

## 2022-04-09 DIAGNOSIS — E78.5 DYSLIPIDEMIA: ICD-10-CM

## 2022-04-09 DIAGNOSIS — R74.01 TRANSAMINITIS: ICD-10-CM

## 2022-04-09 DIAGNOSIS — E78.1 HYPERTRIGLYCERIDEMIA: ICD-10-CM

## 2022-04-09 LAB
25(OH)D3 SERPL-MCNC: 10 NG/ML (ref 30–100)
ALBUMIN SERPL BCP-MCNC: 4.3 G/DL (ref 3.2–4.9)
ALP SERPL-CCNC: 105 U/L (ref 30–99)
ALT SERPL-CCNC: 42 U/L (ref 2–50)
AST SERPL-CCNC: 28 U/L (ref 12–45)
BILIRUB CONJ SERPL-MCNC: <0.2 MG/DL (ref 0.1–0.5)
BILIRUB INDIRECT SERPL-MCNC: ABNORMAL MG/DL (ref 0–1)
BILIRUB SERPL-MCNC: 0.2 MG/DL (ref 0.1–1.5)
CHOLEST SERPL-MCNC: 160 MG/DL (ref 100–199)
FASTING STATUS PATIENT QL REPORTED: NORMAL
GGT SERPL-CCNC: 50 U/L (ref 7–51)
HDLC SERPL-MCNC: 32 MG/DL
LDLC SERPL CALC-MCNC: 110 MG/DL
PROT SERPL-MCNC: 7.2 G/DL (ref 6–8.2)
TRIGL SERPL-MCNC: 90 MG/DL (ref 0–149)
VIT B12 SERPL-MCNC: 713 PG/ML (ref 211–911)

## 2022-04-09 PROCEDURE — 80076 HEPATIC FUNCTION PANEL: CPT

## 2022-04-09 PROCEDURE — 82977 ASSAY OF GGT: CPT

## 2022-04-09 PROCEDURE — 82607 VITAMIN B-12: CPT

## 2022-04-09 PROCEDURE — 82306 VITAMIN D 25 HYDROXY: CPT

## 2022-04-09 PROCEDURE — 80061 LIPID PANEL: CPT

## 2022-04-09 PROCEDURE — 36415 COLL VENOUS BLD VENIPUNCTURE: CPT

## 2022-04-11 DIAGNOSIS — E55.9 VITAMIN D DEFICIENCY: ICD-10-CM

## 2022-04-11 RX ORDER — ERGOCALCIFEROL 1.25 MG/1
50000 CAPSULE ORAL
Qty: 8 CAPSULE | Refills: 0 | Status: SHIPPED | OUTPATIENT
Start: 2022-04-11 | End: 2023-01-05

## 2022-06-03 ENCOUNTER — APPOINTMENT (RX ONLY)
Dept: URBAN - METROPOLITAN AREA CLINIC 20 | Facility: CLINIC | Age: 32
Setting detail: DERMATOLOGY
End: 2022-06-03

## 2022-06-03 DIAGNOSIS — D18.0 HEMANGIOMA: ICD-10-CM

## 2022-06-03 DIAGNOSIS — L81.4 OTHER MELANIN HYPERPIGMENTATION: ICD-10-CM

## 2022-06-03 DIAGNOSIS — L30.8 OTHER SPECIFIED DERMATITIS: ICD-10-CM

## 2022-06-03 DIAGNOSIS — L70.0 ACNE VULGARIS: ICD-10-CM | Status: INADEQUATELY CONTROLLED

## 2022-06-03 DIAGNOSIS — L20.89 OTHER ATOPIC DERMATITIS: ICD-10-CM

## 2022-06-03 DIAGNOSIS — D22 MELANOCYTIC NEVI: ICD-10-CM

## 2022-06-03 DIAGNOSIS — Z71.89 OTHER SPECIFIED COUNSELING: ICD-10-CM

## 2022-06-03 DIAGNOSIS — L21.8 OTHER SEBORRHEIC DERMATITIS: ICD-10-CM

## 2022-06-03 PROBLEM — L20.84 INTRINSIC (ALLERGIC) ECZEMA: Status: ACTIVE | Noted: 2022-06-03

## 2022-06-03 PROBLEM — D22.72 MELANOCYTIC NEVI OF LEFT LOWER LIMB, INCLUDING HIP: Status: ACTIVE | Noted: 2022-06-03

## 2022-06-03 PROBLEM — D22.71 MELANOCYTIC NEVI OF RIGHT LOWER LIMB, INCLUDING HIP: Status: ACTIVE | Noted: 2022-06-03

## 2022-06-03 PROBLEM — D23.61 OTHER BENIGN NEOPLASM OF SKIN OF RIGHT UPPER LIMB, INCLUDING SHOULDER: Status: ACTIVE | Noted: 2022-06-03

## 2022-06-03 PROBLEM — D22.61 MELANOCYTIC NEVI OF RIGHT UPPER LIMB, INCLUDING SHOULDER: Status: ACTIVE | Noted: 2022-06-03

## 2022-06-03 PROBLEM — D23.62 OTHER BENIGN NEOPLASM OF SKIN OF LEFT UPPER LIMB, INCLUDING SHOULDER: Status: ACTIVE | Noted: 2022-06-03

## 2022-06-03 PROBLEM — D22.5 MELANOCYTIC NEVI OF TRUNK: Status: ACTIVE | Noted: 2022-06-03

## 2022-06-03 PROBLEM — D18.01 HEMANGIOMA OF SKIN AND SUBCUTANEOUS TISSUE: Status: ACTIVE | Noted: 2022-06-03

## 2022-06-03 PROBLEM — D22.62 MELANOCYTIC NEVI OF LEFT UPPER LIMB, INCLUDING SHOULDER: Status: ACTIVE | Noted: 2022-06-03

## 2022-06-03 PROCEDURE — 99204 OFFICE O/P NEW MOD 45 MIN: CPT

## 2022-06-03 PROCEDURE — ? PRESCRIPTION

## 2022-06-03 PROCEDURE — ? COUNSELING

## 2022-06-03 PROCEDURE — ? MEDICATION COUNSELING

## 2022-06-03 PROCEDURE — ? ADDITIONAL NOTES

## 2022-06-03 RX ORDER — DOXYCYCLINE 100 MG/1
TABLET, FILM COATED ORAL BID
Qty: 60 | Refills: 2 | Status: ERX | COMMUNITY
Start: 2022-06-03

## 2022-06-03 RX ORDER — TRIAMCINOLONE ACETONIDE 1 MG/G
CREAM TOPICAL BID
Qty: 80 | Refills: 1 | Status: ERX | COMMUNITY
Start: 2022-06-03

## 2022-06-03 RX ADMIN — DOXYCYCLINE: 100 TABLET, FILM COATED ORAL at 00:00

## 2022-06-03 RX ADMIN — TRIAMCINOLONE ACETONIDE: 1 CREAM TOPICAL at 00:00

## 2022-06-03 ASSESSMENT — LOCATION ZONE DERM
LOCATION ZONE: LEG
LOCATION ZONE: FACE
LOCATION ZONE: ARM
LOCATION ZONE: SCALP
LOCATION ZONE: TRUNK

## 2022-06-03 ASSESSMENT — LOCATION DETAILED DESCRIPTION DERM
LOCATION DETAILED: RIGHT MEDIAL INFERIOR CHEST
LOCATION DETAILED: RIGHT VENTRAL PROXIMAL FOREARM
LOCATION DETAILED: RIGHT VENTRAL DISTAL FOREARM
LOCATION DETAILED: LEFT PROXIMAL DORSAL FOREARM
LOCATION DETAILED: LEFT VENTRAL DISTAL FOREARM
LOCATION DETAILED: POSTERIOR MID-PARIETAL SCALP
LOCATION DETAILED: RIGHT SUPERIOR LATERAL MIDBACK
LOCATION DETAILED: RIGHT PROXIMAL DORSAL FOREARM
LOCATION DETAILED: LEFT DISTAL POSTERIOR THIGH
LOCATION DETAILED: RIGHT MID-UPPER BACK
LOCATION DETAILED: STERNUM
LOCATION DETAILED: LEFT PROXIMAL CALF
LOCATION DETAILED: RIGHT PROXIMAL CALF
LOCATION DETAILED: RIGHT DISTAL POSTERIOR THIGH
LOCATION DETAILED: SUPERIOR THORACIC SPINE
LOCATION DETAILED: RIGHT DISTAL PRETIBIAL REGION
LOCATION DETAILED: LEFT CENTRAL MALAR CHEEK
LOCATION DETAILED: LEFT ANTERIOR DISTAL UPPER ARM

## 2022-06-03 ASSESSMENT — LOCATION SIMPLE DESCRIPTION DERM
LOCATION SIMPLE: LEFT CALF
LOCATION SIMPLE: LEFT UPPER ARM
LOCATION SIMPLE: LEFT POSTERIOR THIGH
LOCATION SIMPLE: LEFT FOREARM
LOCATION SIMPLE: RIGHT LOWER BACK
LOCATION SIMPLE: RIGHT CALF
LOCATION SIMPLE: RIGHT FOREARM
LOCATION SIMPLE: UPPER BACK
LOCATION SIMPLE: RIGHT UPPER BACK
LOCATION SIMPLE: CHEST
LOCATION SIMPLE: LEFT CHEEK
LOCATION SIMPLE: POSTERIOR SCALP
LOCATION SIMPLE: RIGHT PRETIBIAL REGION
LOCATION SIMPLE: RIGHT POSTERIOR THIGH

## 2022-06-03 NOTE — PROCEDURE: ADDITIONAL NOTES
Render Risk Assessment In Note?: no
Additional Notes: Recommended patient use an OTC benzoyl peroxide body wash in the shower.
Detail Level: Simple
Additional Notes: Recommended patient use Head and Shoulders while washing hair.
Additional Notes: Pt has some areas of involvement in red tattoo area on right lower leg, not on other red areas in other tattoos. He also has involvement in/on tattoo on his right wrist but this extends to the dark areas as well as on non tattoo'd areas\\nAdvised patient to use triamcinolone on area

## 2022-06-03 NOTE — PROCEDURE: COUNSELING
Detail Level: Zone
Detail Level: Generalized
Benzoyl Peroxide Pregnancy And Lactation Text: This medication is Pregnancy Category C. It is unknown if benzoyl peroxide is excreted in breast milk.
Detail Level: Detailed
Minocycline Counseling: Patient advised regarding possible photosensitivity and discoloration of the teeth, skin, lips, tongue and gums.  Patient instructed to avoid sunlight, if possible.  When exposed to sunlight, patients should wear protective clothing, sunglasses, and sunscreen.  The patient was instructed to call the office immediately if the following severe adverse effects occur:  hearing changes, easy bruising/bleeding, severe headache, or vision changes.  The patient verbalized understanding of the proper use and possible adverse effects of minocycline.  All of the patient's questions and concerns were addressed.
Dapsone Pregnancy And Lactation Text: This medication is Pregnancy Category C and is not considered safe during pregnancy or breast feeding.
Winlevi Counseling:  I discussed with the patient the risks of topical clascoterone including but not limited to erythema, scaling, itching, and stinging. Patient voiced their understanding.
Aklief Pregnancy And Lactation Text: It is unknown if this medication is safe to use during pregnancy.  It is unknown if this medication is excreted in breast milk.  Breastfeeding women should use the topical cream on the smallest area of the skin for the shortest time needed while breastfeeding.  Do not apply to nipple and areola.
Bactrim Pregnancy And Lactation Text: This medication is Pregnancy Category D and is known to cause fetal risk.  It is also excreted in breast milk.
Spironolactone Pregnancy And Lactation Text: This medication can cause feminization of the male fetus and should be avoided during pregnancy. The active metabolite is also found in breast milk.
Include Pregnancy/Lactation Warning?: No
Isotretinoin Counseling: Patient should get monthly blood tests, not donate blood, not drive at night if vision affected, not share medication, and not undergo elective surgery for 6 months after tx completed. Side effects reviewed, pt to contact office should one occur.
Topical Clindamycin Counseling: Patient counseled that this medication may cause skin irritation or allergic reactions.  In the event of skin irritation, the patient was advised to reduce the amount of the drug applied or use it less frequently.   The patient verbalized understanding of the proper use and possible adverse effects of clindamycin.  All of the patient's questions and concerns were addressed.
Topical Retinoid counseling:  Patient advised to apply a pea-sized amount only at bedtime and wait 30 minutes after washing their face before applying.  If too drying, patient may add a non-comedogenic moisturizer. The patient verbalized understanding of the proper use and possible adverse effects of retinoids.  All of the patient's questions and concerns were addressed.
Azelaic Acid Counseling: Patient counseled that medicine may cause skin irritation and to avoid applying near the eyes.  In the event of skin irritation, the patient was advised to reduce the amount of the drug applied or use it less frequently.   The patient verbalized understanding of the proper use and possible adverse effects of azelaic acid.  All of the patient's questions and concerns were addressed.
Doxycycline Counseling:  Patient counseled regarding possible photosensitivity and increased risk for sunburn.  Patient instructed to avoid sunlight, if possible.  When exposed to sunlight, patients should wear protective clothing, sunglasses, and sunscreen.  The patient was instructed to call the office immediately if the following severe adverse effects occur:  hearing changes, easy bruising/bleeding, severe headache, or vision changes.  The patient verbalized understanding of the proper use and possible adverse effects of doxycycline.  All of the patient's questions and concerns were addressed.
Winlevi Pregnancy And Lactation Text: This medication is considered safe during pregnancy and breastfeeding.
Birth Control Pills Counseling: Birth Control Pill Counseling: I discussed with the patient the potential side effects of OCPs including but not limited to increased risk of stroke, heart attack, thrombophlebitis, deep venous thrombosis, hepatic adenomas, breast changes, GI upset, headaches, and depression.  The patient verbalized understanding of the proper use and possible adverse effects of OCPs. All of the patient's questions and concerns were addressed.
Minocycline Pregnancy And Lactation Text: This medication is Pregnancy Category D and not consider safe during pregnancy. It is also excreted in breast milk.
Topical Clindamycin Pregnancy And Lactation Text: This medication is Pregnancy Category B and is considered safe during pregnancy. It is unknown if it is excreted in breast milk.
Azithromycin Counseling:  I discussed with the patient the risks of azithromycin including but not limited to GI upset, allergic reaction, drug rash, diarrhea, and yeast infections.
Isotretinoin Pregnancy And Lactation Text: This medication is Pregnancy Category X and is considered extremely dangerous during pregnancy. It is unknown if it is excreted in breast milk.
Doxycycline Pregnancy And Lactation Text: This medication is Pregnancy Category D and not consider safe during pregnancy. It is also excreted in breast milk but is considered safe for shorter treatment courses.
Tetracycline Counseling: Patient counseled regarding possible photosensitivity and increased risk for sunburn.  Patient instructed to avoid sunlight, if possible.  When exposed to sunlight, patients should wear protective clothing, sunglasses, and sunscreen.  The patient was instructed to call the office immediately if the following severe adverse effects occur:  hearing changes, easy bruising/bleeding, severe headache, or vision changes.  The patient verbalized understanding of the proper use and possible adverse effects of tetracycline.  All of the patient's questions and concerns were addressed. Patient understands to avoid pregnancy while on therapy due to potential birth defects.
Sarecycline Counseling: Patient advised regarding possible photosensitivity and discoloration of the teeth, skin, lips, tongue and gums.  Patient instructed to avoid sunlight, if possible.  When exposed to sunlight, patients should wear protective clothing, sunglasses, and sunscreen.  The patient was instructed to call the office immediately if the following severe adverse effects occur:  hearing changes, easy bruising/bleeding, severe headache, or vision changes.  The patient verbalized understanding of the proper use and possible adverse effects of sarecycline.  All of the patient's questions and concerns were addressed.
Topical Retinoid Pregnancy And Lactation Text: This medication is Pregnancy Category C. It is unknown if this medication is excreted in breast milk.
Birth Control Pills Pregnancy And Lactation Text: This medication should be avoided if pregnant and for the first 30 days post-partum.
Azithromycin Pregnancy And Lactation Text: This medication is considered safe during pregnancy and is also secreted in breast milk.
Azelaic Acid Pregnancy And Lactation Text: This medication is considered safe during pregnancy and breast feeding.
High Dose Vitamin A Counseling: Side effects reviewed, pt to contact office should one occur.
Topical Sulfur Applications Counseling: Topical Sulfur Counseling: Patient counseled that this medication may cause skin irritation or allergic reactions.  In the event of skin irritation, the patient was advised to reduce the amount of the drug applied or use it less frequently.   The patient verbalized understanding of the proper use and possible adverse effects of topical sulfur application.  All of the patient's questions and concerns were addressed.
Erythromycin Counseling:  I discussed with the patient the risks of erythromycin including but not limited to GI upset, allergic reaction, drug rash, diarrhea, increase in liver enzymes, and yeast infections.
Tazorac Counseling:  Patient advised that medication is irritating and drying.  Patient may need to apply sparingly and wash off after an hour before eventually leaving it on overnight.  The patient verbalized understanding of the proper use and possible adverse effects of tazorac.  All of the patient's questions and concerns were addressed.
Dapsone Counseling: I discussed with the patient the risks of dapsone including but not limited to hemolytic anemia, agranulocytosis, rashes, methemoglobinemia, kidney failure, peripheral neuropathy, headaches, GI upset, and liver toxicity.  Patients who start dapsone require monitoring including baseline LFTs and weekly CBCs for the first month, then every month thereafter.  The patient verbalized understanding of the proper use and possible adverse effects of dapsone.  All of the patient's questions and concerns were addressed.
High Dose Vitamin A Pregnancy And Lactation Text: High dose vitamin A therapy is contraindicated during pregnancy and breast feeding.
Benzoyl Peroxide Counseling: Patient counseled that medicine may cause skin irritation and bleach clothing.  In the event of skin irritation, the patient was advised to reduce the amount of the drug applied or use it less frequently.   The patient verbalized understanding of the proper use and possible adverse effects of benzoyl peroxide.  All of the patient's questions and concerns were addressed.
Aklief counseling:  Patient advised to apply a pea-sized amount only at bedtime and wait 30 minutes after washing their face before applying.  If too drying, patient may add a non-comedogenic moisturizer.  The most commonly reported side effects including irritation, redness, scaling, dryness, stinging, burning, itching, and increased risk of sunburn.  The patient verbalized understanding of the proper use and possible adverse effects of retinoids.  All of the patient's questions and concerns were addressed.
Erythromycin Pregnancy And Lactation Text: This medication is Pregnancy Category B and is considered safe during pregnancy. It is also excreted in breast milk.
Topical Sulfur Applications Pregnancy And Lactation Text: This medication is Pregnancy Category C and has an unknown safety profile during pregnancy. It is unknown if this topical medication is excreted in breast milk.
Spironolactone Counseling: Patient advised regarding risks of diarrhea, abdominal pain, hyperkalemia, birth defects (for female patients), liver toxicity and renal toxicity. The patient may need blood work to monitor liver and kidney function and potassium levels while on therapy. The patient verbalized understanding of the proper use and possible adverse effects of spironolactone.  All of the patient's questions and concerns were addressed.
Bactrim Counseling:  I discussed with the patient the risks of sulfa antibiotics including but not limited to GI upset, allergic reaction, drug rash, diarrhea, dizziness, photosensitivity, and yeast infections.  Rarely, more serious reactions can occur including but not limited to aplastic anemia, agranulocytosis, methemoglobinemia, blood dyscrasias, liver or kidney failure, lung infiltrates or desquamative/blistering drug rashes.
Tazorac Pregnancy And Lactation Text: This medication is not safe during pregnancy. It is unknown if this medication is excreted in breast milk.

## 2022-08-05 ENCOUNTER — OFFICE VISIT (OUTPATIENT)
Dept: URGENT CARE | Facility: CLINIC | Age: 32
End: 2022-08-05
Payer: COMMERCIAL

## 2022-08-05 VITALS
SYSTOLIC BLOOD PRESSURE: 134 MMHG | TEMPERATURE: 104 F | BODY MASS INDEX: 40.46 KG/M2 | HEART RATE: 142 BPM | WEIGHT: 289 LBS | OXYGEN SATURATION: 98 % | RESPIRATION RATE: 16 BRPM | HEIGHT: 71 IN | DIASTOLIC BLOOD PRESSURE: 70 MMHG

## 2022-08-05 DIAGNOSIS — U07.1 COVID: ICD-10-CM

## 2022-08-05 DIAGNOSIS — J34.89 NASAL CONGESTION WITH RHINORRHEA: ICD-10-CM

## 2022-08-05 DIAGNOSIS — R09.81 NASAL CONGESTION WITH RHINORRHEA: ICD-10-CM

## 2022-08-05 DIAGNOSIS — J02.9 SORE THROAT: ICD-10-CM

## 2022-08-05 DIAGNOSIS — R50.9 FEVER, UNSPECIFIED FEVER CAUSE: ICD-10-CM

## 2022-08-05 LAB
EXTERNAL QUALITY CONTROL: ABNORMAL
FLUAV+FLUBV AG SPEC QL IA: NEGATIVE
INT CON NEG: NEGATIVE
INT CON NEG: NEGATIVE
INT CON POS: POSITIVE
INT CON POS: POSITIVE
S PYO AG THROAT QL: NEGATIVE
SARS-COV+SARS-COV-2 AG RESP QL IA.RAPID: POSITIVE

## 2022-08-05 PROCEDURE — 99213 OFFICE O/P EST LOW 20 MIN: CPT | Mod: CS | Performed by: NURSE PRACTITIONER

## 2022-08-05 PROCEDURE — 87880 STREP A ASSAY W/OPTIC: CPT | Performed by: NURSE PRACTITIONER

## 2022-08-05 PROCEDURE — 87804 INFLUENZA ASSAY W/OPTIC: CPT | Performed by: NURSE PRACTITIONER

## 2022-08-05 PROCEDURE — 87426 SARSCOV CORONAVIRUS AG IA: CPT | Performed by: NURSE PRACTITIONER

## 2022-08-05 RX ORDER — ACETAMINOPHEN 500 MG
1000 TABLET ORAL ONCE
Status: COMPLETED | OUTPATIENT
Start: 2022-08-05 | End: 2022-08-05

## 2022-08-05 RX ADMIN — Medication 1000 MG: at 14:10

## 2022-08-05 ASSESSMENT — ENCOUNTER SYMPTOMS
ABDOMINAL PAIN: 0
CONSTIPATION: 0
DIARRHEA: 0
CHILLS: 1
NECK PAIN: 0
NAUSEA: 0
SORE THROAT: 1
HEADACHES: 0
SHORTNESS OF BREATH: 0
DIZZINESS: 0
EYE REDNESS: 0
CARDIOVASCULAR NEGATIVE: 1
WEAKNESS: 0
WHEEZING: 0
VOMITING: 0
MYALGIAS: 0
EYE DISCHARGE: 0
SINUS PAIN: 0
FEVER: 1
COUGH: 0

## 2022-08-05 ASSESSMENT — FIBROSIS 4 INDEX: FIB4 SCORE: 0.49

## 2022-08-05 NOTE — PROGRESS NOTES
Subjective     Chao Pereira is a 31 y.o. male who presents with Pharyngitis (Pt states began last night, post nasal drip, high temp 104.0)            HPI  States had been experiencing fever but did not check temperature and postnasal drainage that started last night.  Took Tylenol this morning approximately 6 hours ago.  Was at work and felt warm again and fever was up to 104.   has sore throat without cough or shortness of breath.  Has had right ear congestion on and off as well.  Denies nausea vomiting or diarrhea without abdominal pain.  Unknown exposure to others with known illness.  No others at home or work are ill.  History of COVID and hospitalization with oxygen in 2021.  Had followed up with pulmonology and cardiology due to oxygen dependent post-hospitalization as well as tachycardia.   does not take any prescribed or over-the-counter medications on a regular basis.  Denies any chronic health issues but states he is obese.  Last seen by primary care as well as cardiology and pulmonology in December 2021.      PMH:  has a past medical history of Acute respiratory failure due to COVID-19 (HCC) (9/10/2021), Allergy, Cough, Dyslipidemia, Morning headache, Pneumonia due to COVID-19 virus (12/1/2021), Shortness of breath, Sputum production, and Wears glasses.  MEDS:   Current Outpatient Medications:   •  fluticasone (FLONASE) 50 MCG/ACT nasal spray, Administer 1-2 Sprays into affected nostril(S) every day. Each nostril., Disp: 16 g, Rfl: 6  •  acetaminophen (TYLENOL) 500 MG Tab, Take 1 Tablet by mouth every four hours as needed for Mild Pain or Moderate Pain., Disp: , Rfl:   •  vitamin D2, Ergocalciferol, (DRISDOL) 1.25 MG (12630 UT) Cap capsule, Take 1 Capsule by mouth every 7 days. (Patient not taking: Reported on 8/5/2022), Disp: 8 Capsule, Rfl: 0    Current Facility-Administered Medications:   •  acetaminophen (TYLENOL) tablet 1,000 mg, 1,000 mg, Oral, Once, Debbie Zhang,  "A.P.R.N.  ALLERGIES: No Known Allergies  SURGHX:   Past Surgical History:   Procedure Laterality Date   • OTHER  2005    triple arthrodesis   • VASECTOMY       SOCHX:  reports that he has never smoked. He has never used smokeless tobacco. He reports previous alcohol use. He reports that he does not use drugs.  FH: Family history was reviewed, no pertinent findings to report    Review of Systems   Constitutional: Positive for chills and fever. Negative for malaise/fatigue.   HENT: Positive for congestion, ear pain and sore throat. Negative for sinus pain.    Eyes: Negative for discharge and redness.   Respiratory: Negative for cough, shortness of breath and wheezing.    Cardiovascular: Negative.    Gastrointestinal: Negative for abdominal pain, constipation, diarrhea, nausea and vomiting.   Musculoskeletal: Negative for myalgias and neck pain.   Skin: Negative for itching and rash.   Neurological: Negative for dizziness, weakness and headaches.   Endo/Heme/Allergies: Negative for environmental allergies.   All other systems reviewed and are negative.             Objective     /70 (BP Location: Left arm, Patient Position: Sitting, BP Cuff Size: Large adult)   Pulse (!) 142   Temp (!) 40.6 °C (105.1 °F) (Temporal)   Resp 16   Ht 1.803 m (5' 11\")   Wt (!) 131 kg (289 lb)   SpO2 98%   BMI 40.31 kg/m²      Physical Exam  Vitals reviewed.   Constitutional:       General: He is awake. He is not in acute distress.     Appearance: Normal appearance. He is well-developed. He is not ill-appearing, toxic-appearing or diaphoretic.   HENT:      Head: Normocephalic.      Right Ear: Ear canal and external ear normal. A middle ear effusion is present.      Left Ear: Ear canal and external ear normal. A middle ear effusion is present.      Nose: Congestion and rhinorrhea present.      Mouth/Throat:      Lips: Pink.      Mouth: Mucous membranes are dry.      Pharynx: Uvula midline. Posterior oropharyngeal erythema present. " No pharyngeal swelling, oropharyngeal exudate or uvula swelling.      Tonsils: No tonsillar exudate or tonsillar abscesses. 1+ on the right. 1+ on the left.   Eyes:      Conjunctiva/sclera: Conjunctivae normal.      Pupils: Pupils are equal, round, and reactive to light.   Cardiovascular:      Rate and Rhythm: Regular rhythm. Tachycardia present.      Heart sounds: Normal heart sounds, S1 normal and S2 normal.   Pulmonary:      Effort: Pulmonary effort is normal. No tachypnea, accessory muscle usage or respiratory distress.      Breath sounds: Normal breath sounds and air entry. No stridor, decreased air movement or transmitted upper airway sounds. No decreased breath sounds, wheezing, rhonchi or rales.   Musculoskeletal:         General: Normal range of motion.      Cervical back: Normal range of motion and neck supple.   Skin:     General: Skin is warm and dry.   Neurological:      Mental Status: He is alert and oriented to person, place, and time.      GCS: GCS eye subscore is 4. GCS verbal subscore is 5. GCS motor subscore is 6.      Cranial Nerves: Cranial nerves are intact.      Sensory: Sensation is intact.   Psychiatric:         Attention and Perception: Attention normal.         Mood and Affect: Mood normal.         Speech: Speech normal.         Behavior: Behavior normal. Behavior is cooperative.         Thought Content: Thought content normal.         Cognition and Memory: Cognition and memory normal.         Judgment: Judgment normal.                             Assessment & Plan        1. Fever, unspecified fever cause    - POCT Influenza A/B  - POCT SARS-COV Antigen CARLEEN (Symptomatic only)  - acetaminophen (TYLENOL) tablet 1,000 mg    2. Nasal congestion with rhinorrhea    - POCT Influenza A/B  - POCT SARS-COV Antigen CARLEEN (Symptomatic only)    3. Sore throat    - POCT Rapid Strep A  - POCT Influenza A/B  - POCT SARS-COV Antigen CARLEEN (Symptomatic only)    4. COVID    - molnupiravir 200 MG capsule; Take 4  Capsules by mouth 2 times a day for 5 days.  Dispense: 40 Capsule; Refill: 0    -Stay home isolated from others until COVID test resulted the follow CDC guidelines for positive cases as discussed  -Increase water intake  -May use over the counter Tylenol/Ibuprofen as needed for fever or body aches  -Get rest  -Salt water gargle as needed for any sore throat  -May use over the counter Flonase, saline nasal spray as needed for any nasal congestion  -May use over the counter cough suppressant medications like plain Robitussin/Delsym as needed   -May take over the counter Imodium as needed for diarrhea  -Monitor for fevers, cough, shortness of breath, chest pain, chest tightness, lethargy- need re-evaluation

## 2022-08-05 NOTE — LETTER
August 5, 2022       Patient: Chao Pereira   YOB: 1990   Date of Visit: 8/5/2022         To Whom It May Concern:    In my medical opinion, I recommend that Chao Pereira be excused from work as he has tested positive for COVID today. Please follow CDC guidelines for isolation for COVID for 5 days.    If you have any questions or concerns, please don't hesitate to call [phone]          Sincerely,          VENKATESH Beal.  Electronically Signed

## 2022-09-08 ENCOUNTER — PATIENT MESSAGE (OUTPATIENT)
Dept: MEDICAL GROUP | Facility: IMAGING CENTER | Age: 32
End: 2022-09-08

## 2022-09-08 DIAGNOSIS — R09.82 POST-NASAL DRIP: ICD-10-CM

## 2022-09-08 RX ORDER — FLUTICASONE PROPIONATE 50 MCG
1-2 SPRAY, SUSPENSION (ML) NASAL DAILY
Qty: 16 G | Refills: 6 | Status: SHIPPED | OUTPATIENT
Start: 2022-09-08 | End: 2023-05-26 | Stop reason: SDUPTHER

## 2022-09-09 ENCOUNTER — PATIENT MESSAGE (OUTPATIENT)
Dept: MEDICAL GROUP | Facility: IMAGING CENTER | Age: 32
End: 2022-09-09

## 2022-09-09 DIAGNOSIS — R05.9 COUGH: ICD-10-CM

## 2022-09-09 RX ORDER — ALBUTEROL SULFATE 90 UG/1
2 AEROSOL, METERED RESPIRATORY (INHALATION) EVERY 4 HOURS PRN
Qty: 1 EACH | Refills: 0 | Status: SHIPPED | OUTPATIENT
Start: 2022-09-09 | End: 2022-12-19

## 2022-12-17 DIAGNOSIS — R05.9 COUGH: ICD-10-CM

## 2022-12-19 RX ORDER — ALBUTEROL SULFATE 90 UG/1
AEROSOL, METERED RESPIRATORY (INHALATION)
Qty: 18 G | Refills: 0 | Status: SHIPPED | OUTPATIENT
Start: 2022-12-19

## 2022-12-19 NOTE — TELEPHONE ENCOUNTER
Received request via: Pharmacy    Was the patient seen in the last year in this department? No - pt has appt scheduled 1/5/23    Does the patient have an active prescription (recently filled or refills available) for medication(s) requested? No    Does the patient have [facility] and need 100 day supply (blood pressure, diabetes and cholesterol meds only)? Patient does not have SCP

## 2023-01-05 ENCOUNTER — TELEMEDICINE (OUTPATIENT)
Dept: MEDICAL GROUP | Facility: IMAGING CENTER | Age: 33
End: 2023-01-05
Payer: COMMERCIAL

## 2023-01-05 VITALS — WEIGHT: 278 LBS | HEIGHT: 71 IN | BODY MASS INDEX: 38.92 KG/M2

## 2023-01-05 DIAGNOSIS — Z13.1 DIABETES MELLITUS SCREENING: ICD-10-CM

## 2023-01-05 DIAGNOSIS — Z13.0 SCREENING FOR DEFICIENCY ANEMIA: ICD-10-CM

## 2023-01-05 DIAGNOSIS — R06.83 SNORING: ICD-10-CM

## 2023-01-05 DIAGNOSIS — R09.82 POST-NASAL DRIP: ICD-10-CM

## 2023-01-05 DIAGNOSIS — E78.5 DYSLIPIDEMIA: ICD-10-CM

## 2023-01-05 DIAGNOSIS — E55.9 VITAMIN D DEFICIENCY: ICD-10-CM

## 2023-01-05 DIAGNOSIS — Z13.29 SCREENING FOR THYROID DISORDER: ICD-10-CM

## 2023-01-05 DIAGNOSIS — R05.1 ACUTE COUGH: ICD-10-CM

## 2023-01-05 DIAGNOSIS — Z88.9 H/O SEASONAL ALLERGIES: ICD-10-CM

## 2023-01-05 DIAGNOSIS — E66.01 CLASS 2 SEVERE OBESITY DUE TO EXCESS CALORIES WITH SERIOUS COMORBIDITY AND BODY MASS INDEX (BMI) OF 38.0 TO 38.9 IN ADULT (HCC): ICD-10-CM

## 2023-01-05 DIAGNOSIS — Z86.16 HISTORY OF COVID-19: ICD-10-CM

## 2023-01-05 PROBLEM — R74.01 TRANSAMINITIS: Status: RESOLVED | Noted: 2021-09-20 | Resolved: 2023-01-05

## 2023-01-05 PROBLEM — E66.812 CLASS 2 SEVERE OBESITY DUE TO EXCESS CALORIES WITH SERIOUS COMORBIDITY AND BODY MASS INDEX (BMI) OF 38.0 TO 38.9 IN ADULT (HCC): Status: ACTIVE | Noted: 2021-03-23

## 2023-01-05 PROBLEM — R05.9 COUGH: Status: ACTIVE | Noted: 2023-01-05

## 2023-01-05 PROCEDURE — 99214 OFFICE O/P EST MOD 30 MIN: CPT | Mod: 95 | Performed by: PHYSICIAN ASSISTANT

## 2023-01-05 RX ORDER — ALBUTEROL SULFATE 90 UG/1
2 AEROSOL, METERED RESPIRATORY (INHALATION) EVERY 4 HOURS PRN
COMMUNITY
Start: 2022-12-19 | End: 2023-01-05

## 2023-01-05 RX ORDER — ERGOCALCIFEROL 1.25 MG/1
50000 CAPSULE ORAL
Qty: 4 CAPSULE | Refills: 0 | Status: SHIPPED | OUTPATIENT
Start: 2023-01-05 | End: 2023-02-02

## 2023-01-05 RX ORDER — MONTELUKAST SODIUM 10 MG/1
10 TABLET ORAL DAILY
Qty: 30 TABLET | Refills: 0 | Status: SHIPPED | OUTPATIENT
Start: 2023-01-05 | End: 2023-02-03

## 2023-01-05 RX ORDER — FLUTICASONE PROPIONATE 50 MCG
50-100 SPRAY, SUSPENSION (ML) NASAL
COMMUNITY
Start: 2022-09-08 | End: 2023-01-05

## 2023-01-05 RX ORDER — AMOXICILLIN AND CLAVULANATE POTASSIUM 875; 125 MG/1; MG/1
1 TABLET, FILM COATED ORAL 2 TIMES DAILY
COMMUNITY
Start: 2022-12-31 | End: 2023-01-10

## 2023-01-05 ASSESSMENT — PATIENT HEALTH QUESTIONNAIRE - PHQ9: CLINICAL INTERPRETATION OF PHQ2 SCORE: 0

## 2023-01-05 ASSESSMENT — FIBROSIS 4 INDEX: FIB4 SCORE: 0.5

## 2023-01-06 NOTE — ASSESSMENT & PLAN NOTE
Chronic, patient admits to weight gain on and off over the past year since.  Wants to try to lose weight but is unable to exercise due to frequent cough from COVID.

## 2023-01-06 NOTE — ASSESSMENT & PLAN NOTE
Patient states his snoring has worsened over the past several years with his weight gain.  Interested in sleep study discussion.

## 2023-01-06 NOTE — PROGRESS NOTES
Virtual Visit: Established Patient   This visit was conducted via Zoom using secure and encrypted videoconferencing technology. The patient was in a private location in the state of [place].    The patient's identity was confirmed and verbal consent was obtained for this virtual visit.    Subjective:   CC:   Chief Complaint   Patient presents with    Cough     Ongoing since covid dx in august       Chao Pereira is a 32 y.o. male presenting for evaluation and management of:    Class 2 severe obesity due to excess calories with serious comorbidity and body mass index (BMI) of 38.0 to 38.9 in adult (HCC)  Chronic, patient admits to weight gain on and off over the past year since.  Wants to try to lose weight but is unable to exercise due to frequent cough from COVID.    Cough  Patient admits to persistent random cough for the past 3 months, triggered by most recent COVID infection in August.  He states he habits and randomly throughout the day, worse with cold exposure and activity.  Tends to be worse in the morning with associated postnasal drip.  Currently on Flonase and Augmentin due to eustachian tube dysfunction.  No other over-the-counter medications.  Using albuterol as needed.    Dyslipidemia  Chronic, no recent dietary changes.  Wants to lose weight.    Snoring  Patient states his snoring has worsened over the past several years with his weight gain.  Interested in sleep study discussion.    Vitamin D deficiency  Chronic, patient needs refill of vitamin D.    Depression Screening    Little interest or pleasure in doing things?  0 - not at all  Feeling down, depressed , or hopeless? 0 - not at all  Patient Health Questionnaire Score: 0    ROS   Denies any recent fevers or chills. No nausea or vomiting. No chest pains or shortness of breath.     No Known Allergies    Current medicines (including changes today)  Current Outpatient Medications   Medication Sig Dispense Refill    amoxicillin-clavulanate  "(AUGMENTIN) 875-125 MG Tab Take 1 Tablet by mouth 2 times a day.      montelukast (SINGULAIR) 10 MG Tab Take 1 Tablet by mouth every day. 30 Tablet 0    vitamin D2, Ergocalciferol, (DRISDOL) 1.25 MG (09094 UT) Cap capsule Take 1 Capsule by mouth every 7 days. 4 Capsule 0    albuterol 108 (90 Base) MCG/ACT Aero Soln inhalation aerosol INHALE TWO PUFFS BY MOUTH EVERY 4 HOURS AS NEEDED FOR SHORTNESS OF BREATH 18 g 0    fluticasone (FLONASE) 50 MCG/ACT nasal spray Administer 1-2 Sprays into affected nostril(S) every day. Each nostril. 16 g 6    acetaminophen (TYLENOL) 500 MG Tab Take 1 Tablet by mouth every four hours as needed for Mild Pain or Moderate Pain.       No current facility-administered medications for this visit.       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  He  has a past surgical history that includes other (2005) and vasectomy.         Objective:   Ht 1.803 m (5' 11\")   Wt (!) 126 kg (278 lb)   BMI 38.77 kg/m²   RR 12    Physical Exam:  Constitutional: Alert, no distress, well-groomed.  Skin: No rashes in visible areas.  Eye: Round. Conjunctiva clear, lids normal. No icterus. "   ENMT: Lips pink without lesions, good dentition, moist mucous membranes. Phonation normal.  Neck: No masses, no thyromegaly. Moves freely without pain.  Respiratory: Unlabored respiratory effort, no cough or audible wheeze  Psych: Alert and oriented x3, normal affect and mood.     Assessment and Plan:   The following treatment plan was discussed:     1. Acute cough  Postviral cough, also with postnasal drip.  We will start Singulair and reassess in 1 month.  If symptoms persist then PFT and chest x-ray to rule out reactive airway disease.  - PULMONARY FUNCTION TESTS -Test requested: Complete Pulmonary Function Test; Future  - DX-CHEST-2 VIEWS; Future  - montelukast (SINGULAIR) 10 MG Tab; Take 1 Tablet by mouth every day.  Dispense: 30 Tablet; Refill: 0    2. Vitamin D deficiency  Recheck labs in 4 weeks.  - vitamin D2, Ergocalciferol, (DRISDOL) 1.25 MG (40177 UT) Cap capsule; Take 1 Capsule by mouth every 7 days.  Dispense: 4 Capsule; Refill: 0  - VITAMIN D,25 HYDROXY (DEFICIENCY); Future    3. H/O seasonal allergies  - montelukast (SINGULAIR) 10 MG Tab; Take 1 Tablet by mouth every day.  Dispense: 30 Tablet; Refill: 0    4. Post-nasal drip  Chronic, uncontrolled.  Associated eustachian tube dysfunction.  Start Claritin-D for 1 week.  Reassess Singulair in 1 month.  - montelukast (SINGULAIR) 10 MG Tab; Take 1 Tablet by mouth every day.  Dispense: 30 Tablet; Refill: 0    5. Dyslipidemia  Chronic, uncontrolled.  Check labs in 1 month.  Low-cholesterol diet.  - Lipid Profile; Future    6. Class 2 severe obesity due to excess calories with serious comorbidity and body mass index (BMI) of 38.0 to 38.9 in adult (HCC)  Chronic, uncontrolled. Please continue working on lifestyle modifications. This includes accumulation of 150 minutes or more of moderate-intensity activity each week as tolerated and a healthy diet that is low in saturated fat, sodium, and cholesterol, such as the mediterranean diet that is typically high  in fruits, vegetables, whole grains, beans, nuts, and seeds, includes olive oil as an important source of monounsaturated fat, DASH diet, and/or also consider a plant-based diet.  Please also increase your dietary fiber intake to 25 to 30 g a day.  - Referral to Pulmonary and Sleep Medicine    7. Snoring  Rule out sleep apnea  - Referral to Pulmonary and Sleep Medicine    8. History of COVID-19  - PULMONARY FUNCTION TESTS -Test requested: Complete Pulmonary Function Test; Future  - DX-CHEST-2 VIEWS; Future    9. Screening for deficiency anemia  - CBC WITH DIFFERENTIAL; Future    10. Diabetes mellitus screening  - Comp Metabolic Panel; Future    11. Screening for thyroid disorder  - TSH WITH REFLEX TO FT4; Future        Follow-up: Return in about 1 month (around 2/5/2023) for Medication recheck, Annual physical.         Melody Zuniga PA-C ([place])  Physician Assistant Certified  [facility]    Please note that this dictation was created using voice recognition software. I have made every reasonable attempt to correct obvious errors, but I expect that there are errors of grammar and possibly content that I did not discover before finalizing the note.

## 2023-01-06 NOTE — ASSESSMENT & PLAN NOTE
Patient admits to persistent random cough for the past 3 months, triggered by most recent COVID infection in August.  He states he habits and randomly throughout the day, worse with cold exposure and activity.  Tends to be worse in the morning with associated postnasal drip.  Currently on Flonase and Augmentin due to eustachian tube dysfunction.  No other over-the-counter medications.  Using albuterol as needed.

## 2023-02-01 DIAGNOSIS — E55.9 VITAMIN D DEFICIENCY: ICD-10-CM

## 2023-02-02 RX ORDER — ERGOCALCIFEROL 1.25 MG/1
50000 CAPSULE ORAL
Qty: 4 CAPSULE | Refills: 0 | Status: SHIPPED | OUTPATIENT
Start: 2023-02-02 | End: 2023-03-16

## 2023-02-11 ENCOUNTER — HOSPITAL ENCOUNTER (OUTPATIENT)
Dept: LAB | Facility: MEDICAL CENTER | Age: 33
End: 2023-02-11
Attending: PHYSICIAN ASSISTANT
Payer: COMMERCIAL

## 2023-02-11 DIAGNOSIS — Z13.0 SCREENING FOR DEFICIENCY ANEMIA: ICD-10-CM

## 2023-02-11 DIAGNOSIS — E55.9 VITAMIN D DEFICIENCY: ICD-10-CM

## 2023-02-11 DIAGNOSIS — Z13.1 DIABETES MELLITUS SCREENING: ICD-10-CM

## 2023-02-11 DIAGNOSIS — Z13.29 SCREENING FOR THYROID DISORDER: ICD-10-CM

## 2023-02-11 DIAGNOSIS — E78.5 DYSLIPIDEMIA: ICD-10-CM

## 2023-02-11 LAB
25(OH)D3 SERPL-MCNC: 22 NG/ML (ref 30–100)
ALBUMIN SERPL BCP-MCNC: 4.4 G/DL (ref 3.2–4.9)
ALBUMIN/GLOB SERPL: 1.6 G/DL
ALP SERPL-CCNC: 127 U/L (ref 30–99)
ALT SERPL-CCNC: 50 U/L (ref 2–50)
ANION GAP SERPL CALC-SCNC: 9 MMOL/L (ref 7–16)
AST SERPL-CCNC: 33 U/L (ref 12–45)
BASOPHILS # BLD AUTO: 0.7 % (ref 0–1.8)
BASOPHILS # BLD: 0.08 K/UL (ref 0–0.12)
BILIRUB SERPL-MCNC: 0.3 MG/DL (ref 0.1–1.5)
BUN SERPL-MCNC: 12 MG/DL (ref 8–22)
CALCIUM ALBUM COR SERPL-MCNC: 9.1 MG/DL (ref 8.5–10.5)
CALCIUM SERPL-MCNC: 9.4 MG/DL (ref 8.5–10.5)
CHLORIDE SERPL-SCNC: 103 MMOL/L (ref 96–112)
CHOLEST SERPL-MCNC: 198 MG/DL (ref 100–199)
CO2 SERPL-SCNC: 26 MMOL/L (ref 20–33)
CREAT SERPL-MCNC: 0.78 MG/DL (ref 0.5–1.4)
EOSINOPHIL # BLD AUTO: 0.24 K/UL (ref 0–0.51)
EOSINOPHIL NFR BLD: 2.1 % (ref 0–6.9)
ERYTHROCYTE [DISTWIDTH] IN BLOOD BY AUTOMATED COUNT: 43.1 FL (ref 35.9–50)
GFR SERPLBLD CREATININE-BSD FMLA CKD-EPI: 120 ML/MIN/1.73 M 2
GLOBULIN SER CALC-MCNC: 2.8 G/DL (ref 1.9–3.5)
GLUCOSE SERPL-MCNC: 80 MG/DL (ref 65–99)
HCT VFR BLD AUTO: 50.9 % (ref 42–52)
HDLC SERPL-MCNC: 32 MG/DL
HGB BLD-MCNC: 15.9 G/DL (ref 14–18)
IMM GRANULOCYTES # BLD AUTO: 0.06 K/UL (ref 0–0.11)
IMM GRANULOCYTES NFR BLD AUTO: 0.5 % (ref 0–0.9)
LDLC SERPL CALC-MCNC: 141 MG/DL
LYMPHOCYTES # BLD AUTO: 1.7 K/UL (ref 1–4.8)
LYMPHOCYTES NFR BLD: 14.9 % (ref 22–41)
MCH RBC QN AUTO: 24.6 PG (ref 27–33)
MCHC RBC AUTO-ENTMCNC: 31.2 G/DL (ref 33.7–35.3)
MCV RBC AUTO: 78.8 FL (ref 81.4–97.8)
MONOCYTES # BLD AUTO: 0.77 K/UL (ref 0–0.85)
MONOCYTES NFR BLD AUTO: 6.8 % (ref 0–13.4)
NEUTROPHILS # BLD AUTO: 8.54 K/UL (ref 1.82–7.42)
NEUTROPHILS NFR BLD: 75 % (ref 44–72)
NRBC # BLD AUTO: 0 K/UL
NRBC BLD-RTO: 0 /100 WBC
PLATELET # BLD AUTO: 374 K/UL (ref 164–446)
PMV BLD AUTO: 10.8 FL (ref 9–12.9)
POTASSIUM SERPL-SCNC: 4.2 MMOL/L (ref 3.6–5.5)
PROT SERPL-MCNC: 7.2 G/DL (ref 6–8.2)
RBC # BLD AUTO: 6.46 M/UL (ref 4.7–6.1)
SODIUM SERPL-SCNC: 138 MMOL/L (ref 135–145)
TRIGL SERPL-MCNC: 123 MG/DL (ref 0–149)
TSH SERPL DL<=0.005 MIU/L-ACNC: 1.91 UIU/ML (ref 0.38–5.33)
WBC # BLD AUTO: 11.4 K/UL (ref 4.8–10.8)

## 2023-02-11 PROCEDURE — 82306 VITAMIN D 25 HYDROXY: CPT

## 2023-02-11 PROCEDURE — 80061 LIPID PANEL: CPT

## 2023-02-11 PROCEDURE — 84443 ASSAY THYROID STIM HORMONE: CPT

## 2023-02-11 PROCEDURE — 80053 COMPREHEN METABOLIC PANEL: CPT

## 2023-02-11 PROCEDURE — 36415 COLL VENOUS BLD VENIPUNCTURE: CPT

## 2023-02-11 PROCEDURE — 85025 COMPLETE CBC W/AUTO DIFF WBC: CPT

## 2023-02-17 ENCOUNTER — OFFICE VISIT (OUTPATIENT)
Dept: MEDICAL GROUP | Facility: IMAGING CENTER | Age: 33
End: 2023-02-17
Payer: COMMERCIAL

## 2023-02-17 VITALS
OXYGEN SATURATION: 100 % | WEIGHT: 283.6 LBS | HEART RATE: 122 BPM | SYSTOLIC BLOOD PRESSURE: 138 MMHG | BODY MASS INDEX: 39.7 KG/M2 | HEIGHT: 71 IN | TEMPERATURE: 98.3 F | DIASTOLIC BLOOD PRESSURE: 90 MMHG | RESPIRATION RATE: 18 BRPM

## 2023-02-17 DIAGNOSIS — Z23 NEED FOR VACCINATION: ICD-10-CM

## 2023-02-17 DIAGNOSIS — Q74.2 CONGENITAL FOOT ABNORMALITY: ICD-10-CM

## 2023-02-17 DIAGNOSIS — D22.9 NUMEROUS MOLES: ICD-10-CM

## 2023-02-17 DIAGNOSIS — R71.8 MICROCYTOSIS: ICD-10-CM

## 2023-02-17 DIAGNOSIS — E66.01 CLASS 2 SEVERE OBESITY DUE TO EXCESS CALORIES WITH SERIOUS COMORBIDITY AND BODY MASS INDEX (BMI) OF 39.0 TO 39.9 IN ADULT (HCC): ICD-10-CM

## 2023-02-17 DIAGNOSIS — Z11.1 TUBERCULOSIS SCREENING: ICD-10-CM

## 2023-02-17 DIAGNOSIS — E55.9 VITAMIN D DEFICIENCY: ICD-10-CM

## 2023-02-17 DIAGNOSIS — Z00.00 WELLNESS EXAMINATION: ICD-10-CM

## 2023-02-17 DIAGNOSIS — R06.83 SNORING: ICD-10-CM

## 2023-02-17 DIAGNOSIS — R05.3 CHRONIC COUGH: ICD-10-CM

## 2023-02-17 DIAGNOSIS — R74.8 ALKALINE PHOSPHATASE ELEVATION: ICD-10-CM

## 2023-02-17 DIAGNOSIS — E78.5 DYSLIPIDEMIA: Chronic | ICD-10-CM

## 2023-02-17 DIAGNOSIS — D72.9 NEUTROPHILIA: ICD-10-CM

## 2023-02-17 DIAGNOSIS — R03.0 ELEVATED BLOOD PRESSURE READING: ICD-10-CM

## 2023-02-17 DIAGNOSIS — D75.1 ERYTHROCYTOSIS: ICD-10-CM

## 2023-02-17 PROBLEM — D72.828 NEUTROPHILIA: Status: ACTIVE | Noted: 2023-02-17

## 2023-02-17 PROBLEM — Z86.16 HISTORY OF COVID-19: Status: RESOLVED | Noted: 2021-12-03 | Resolved: 2023-02-17

## 2023-02-17 PROBLEM — E78.1 HYPERTRIGLYCERIDEMIA: Status: RESOLVED | Noted: 2021-03-23 | Resolved: 2023-02-17

## 2023-02-17 PROCEDURE — 99395 PREV VISIT EST AGE 18-39: CPT | Mod: 25 | Performed by: PHYSICIAN ASSISTANT

## 2023-02-17 RX ORDER — MELOXICAM 7.5 MG/1
7.5 TABLET ORAL
Qty: 30 TABLET | Refills: 0 | Status: SHIPPED | OUTPATIENT
Start: 2023-02-17 | End: 2024-03-07

## 2023-02-17 RX ORDER — CYCLOBENZAPRINE HCL 10 MG
10 TABLET ORAL 3 TIMES DAILY PRN
Qty: 30 TABLET | Refills: 0 | Status: SHIPPED | OUTPATIENT
Start: 2023-02-17 | End: 2024-03-07

## 2023-02-17 SDOH — HEALTH STABILITY: PHYSICAL HEALTH: ON AVERAGE, HOW MANY DAYS PER WEEK DO YOU ENGAGE IN MODERATE TO STRENUOUS EXERCISE (LIKE A BRISK WALK)?: 1 DAY

## 2023-02-17 SDOH — HEALTH STABILITY: MENTAL HEALTH
STRESS IS WHEN SOMEONE FEELS TENSE, NERVOUS, ANXIOUS, OR CAN'T SLEEP AT NIGHT BECAUSE THEIR MIND IS TROUBLED. HOW STRESSED ARE YOU?: NOT AT ALL

## 2023-02-17 SDOH — HEALTH STABILITY: PHYSICAL HEALTH: ON AVERAGE, HOW MANY MINUTES DO YOU ENGAGE IN EXERCISE AT THIS LEVEL?: 20 MIN

## 2023-02-17 SDOH — ECONOMIC STABILITY: HOUSING INSECURITY
IN THE LAST 12 MONTHS, WAS THERE A TIME WHEN YOU DID NOT HAVE A STEADY PLACE TO SLEEP OR SLEPT IN A SHELTER (INCLUDING NOW)?: NO

## 2023-02-17 SDOH — ECONOMIC STABILITY: FOOD INSECURITY: WITHIN THE PAST 12 MONTHS, YOU WORRIED THAT YOUR FOOD WOULD RUN OUT BEFORE YOU GOT MONEY TO BUY MORE.: NEVER TRUE

## 2023-02-17 SDOH — ECONOMIC STABILITY: INCOME INSECURITY: IN THE LAST 12 MONTHS, WAS THERE A TIME WHEN YOU WERE NOT ABLE TO PAY THE MORTGAGE OR RENT ON TIME?: NO

## 2023-02-17 SDOH — ECONOMIC STABILITY: HOUSING INSECURITY: IN THE LAST 12 MONTHS, HOW MANY PLACES HAVE YOU LIVED?: 1

## 2023-02-17 SDOH — ECONOMIC STABILITY: FOOD INSECURITY: WITHIN THE PAST 12 MONTHS, THE FOOD YOU BOUGHT JUST DIDN'T LAST AND YOU DIDN'T HAVE MONEY TO GET MORE.: NEVER TRUE

## 2023-02-17 SDOH — ECONOMIC STABILITY: TRANSPORTATION INSECURITY
IN THE PAST 12 MONTHS, HAS THE LACK OF TRANSPORTATION KEPT YOU FROM MEDICAL APPOINTMENTS OR FROM GETTING MEDICATIONS?: NO

## 2023-02-17 SDOH — ECONOMIC STABILITY: TRANSPORTATION INSECURITY
IN THE PAST 12 MONTHS, HAS LACK OF TRANSPORTATION KEPT YOU FROM MEETINGS, WORK, OR FROM GETTING THINGS NEEDED FOR DAILY LIVING?: NO

## 2023-02-17 SDOH — ECONOMIC STABILITY: TRANSPORTATION INSECURITY
IN THE PAST 12 MONTHS, HAS LACK OF RELIABLE TRANSPORTATION KEPT YOU FROM MEDICAL APPOINTMENTS, MEETINGS, WORK OR FROM GETTING THINGS NEEDED FOR DAILY LIVING?: NO

## 2023-02-17 SDOH — ECONOMIC STABILITY: INCOME INSECURITY: HOW HARD IS IT FOR YOU TO PAY FOR THE VERY BASICS LIKE FOOD, HOUSING, MEDICAL CARE, AND HEATING?: NOT HARD AT ALL

## 2023-02-17 ASSESSMENT — SOCIAL DETERMINANTS OF HEALTH (SDOH)
IN A TYPICAL WEEK, HOW MANY TIMES DO YOU TALK ON THE PHONE WITH FAMILY, FRIENDS, OR NEIGHBORS?: NEVER
DO YOU BELONG TO ANY CLUBS OR ORGANIZATIONS SUCH AS CHURCH GROUPS UNIONS, FRATERNAL OR ATHLETIC GROUPS, OR SCHOOL GROUPS?: NO
IN A TYPICAL WEEK, HOW MANY TIMES DO YOU TALK ON THE PHONE WITH FAMILY, FRIENDS, OR NEIGHBORS?: NEVER
HOW OFTEN DO YOU GET TOGETHER WITH FRIENDS OR RELATIVES?: NEVER
HOW OFTEN DO YOU HAVE A DRINK CONTAINING ALCOHOL: NEVER
WITHIN THE PAST 12 MONTHS, YOU WORRIED THAT YOUR FOOD WOULD RUN OUT BEFORE YOU GOT THE MONEY TO BUY MORE: NEVER TRUE
HOW HARD IS IT FOR YOU TO PAY FOR THE VERY BASICS LIKE FOOD, HOUSING, MEDICAL CARE, AND HEATING?: NOT HARD AT ALL
HOW OFTEN DO YOU ATTENT MEETINGS OF THE CLUB OR ORGANIZATION YOU BELONG TO?: NEVER
HOW OFTEN DO YOU GET TOGETHER WITH FRIENDS OR RELATIVES?: NEVER
HOW OFTEN DO YOU HAVE SIX OR MORE DRINKS ON ONE OCCASION: NEVER
DO YOU BELONG TO ANY CLUBS OR ORGANIZATIONS SUCH AS CHURCH GROUPS UNIONS, FRATERNAL OR ATHLETIC GROUPS, OR SCHOOL GROUPS?: NO
HOW OFTEN DO YOU ATTEND CHURCH OR RELIGIOUS SERVICES?: NEVER
HOW OFTEN DO YOU ATTEND CHURCH OR RELIGIOUS SERVICES?: NEVER
HOW OFTEN DO YOU ATTENT MEETINGS OF THE CLUB OR ORGANIZATION YOU BELONG TO?: NEVER
HOW MANY DRINKS CONTAINING ALCOHOL DO YOU HAVE ON A TYPICAL DAY WHEN YOU ARE DRINKING: PATIENT DOES NOT DRINK

## 2023-02-17 ASSESSMENT — FIBROSIS 4 INDEX: FIB4 SCORE: 0.42

## 2023-02-17 ASSESSMENT — LIFESTYLE VARIABLES
SKIP TO QUESTIONS 9-10: 1
HOW OFTEN DO YOU HAVE SIX OR MORE DRINKS ON ONE OCCASION: NEVER
AUDIT-C TOTAL SCORE: 0
HOW MANY STANDARD DRINKS CONTAINING ALCOHOL DO YOU HAVE ON A TYPICAL DAY: PATIENT DOES NOT DRINK
HOW OFTEN DO YOU HAVE A DRINK CONTAINING ALCOHOL: NEVER

## 2023-02-18 NOTE — ASSESSMENT & PLAN NOTE
Noted in labs, also with microcytosis. No known personal or family history of thalassemia. Possible sleep apnea. No tob use.

## 2023-02-18 NOTE — PROGRESS NOTES
Subjective:     CC:   Chief Complaint   Patient presents with    Annual Exam       HPI:   Chao presents today with his wife to discuss:    Dyslipidemia  Chronic, uncontrolled.  Follows a poor diet.  Does not exercise.    Class 2 severe obesity due to excess calories with serious comorbidity and body mass index (BMI) of 39.0 to 39.9 in adult (HCC)  Chronic, uncontrolled.  Weight gain since last visit.    Congenital foot abnormality  Chronic, occasional foot pain and spasms.  Requesting refill of meloxicam and Flexeril.    Chronic cough  Chronic, overall improved with the addition of Singulair.  Has not scheduled PFT yet.  Has chest x-ray scheduled for next month.    Snoring  Chronic, concern for sleep apnea.  Has appointment set with sleep specialist in June.  Trying to reschedule to get an earlier appointment.    Vitamin D deficiency  Chronic, on weekly vitamin D 50,000 units    Erythrocytosis  Noted in labs, also with microcytosis. No known personal or family history of thalassemia. Possible sleep apnea. No tob use.    Alkaline phosphatase elevation  Noted in labs. No abd pain or nausea. No recent fractures.    Neutrophilia  Noted in labs. No recent steroids or infection.    Health Maintenance/Anticipatory Guidance:  Diet: higher carbs/sugar  Exercise: not routinely, but considering joining a gym  Substance Abuse: no history  Safe in Relationship: yes  Sun Protection/Sunscreen: sometimes  Dermatologist: due  Dentist: due for twice yearly appointments  Eye Doctor: due  Seatbelts, helmets, and gun safety discussed    Cancer Screening: reviewed    Infectious Disease Screening:  STI/HIV screening: declined    Immunizations: flu shot    Past Medical History:   Diagnosis Date    Acute respiratory failure due to COVID-19 ([facility]) 09/10/2021    Allergy     Dyslipidemia     History of COVID-19 12/3/2021    Morning headache     Pneumonia due to COVID-19 virus 12/01/2021    Wears glasses      Family History   Problem Relation  Age of Onset    Alcohol abuse Mother     Hypertension Father     Drug abuse Sister     Psychiatric Illness Sister         schizophrenia    Drug abuse Sister     Cancer Neg Hx     Diabetes Neg Hx     Heart Disease Neg Hx     Stroke Neg Hx      Past Surgical History:   Procedure Laterality Date    OTHER  2005    triple arthrodesis    VASECTOMY       Social History     Tobacco Use    Smoking status: Never    Smokeless tobacco: Never   Vaping Use    Vaping Use: Never used   Substance Use Topics    Alcohol use: Not Currently     Comment: Less than 1/month    Drug use: Never     Social History     Social History Narrative    , 3 kids     and      Current Outpatient Medications Ordered in Epic   Medication Sig Dispense Refill    cyclobenzaprine (FLEXERIL) 10 mg Tab Take 1 Tablet by mouth 3 times a day as needed for Moderate Pain or Muscle Spasms. 30 Tablet 0    meloxicam (MOBIC) 7.5 MG Tab Take 1 Tablet by mouth 1 time a day as needed for Inflammation or Moderate Pain. 30 Tablet 0    montelukast (SINGULAIR) 10 MG Tab TAKE 1 TABLET BY MOUTH EVERY DAY 90 Tablet 1    vitamin D2, Ergocalciferol, (DRISDOL) 1.25 MG (58600 UT) Cap capsule TAKE 1 CAPSULE BY MOUTH EVERY 7 DAYS 4 Capsule 0    albuterol 108 (90 Base) MCG/ACT Aero Soln inhalation aerosol INHALE TWO PUFFS BY MOUTH EVERY 4 HOURS AS NEEDED FOR SHORTNESS OF BREATH 18 g 0    fluticasone (FLONASE) 50 MCG/ACT nasal spray Administer 1-2 Sprays into affected nostril(S) every day. Each nostril. 16 g 6    acetaminophen (TYLENOL) 500 MG Tab Take 1 Tablet by mouth every four hours as needed for Mild Pain or Moderate Pain.       No current Epic-ordered facility-administered medications on file.     Patient has no known allergies.    PMH/PSH/FH/Social history reviewed.  Vaccinations discussed.  Previous records and labs reviewed. Discussed age appropriate anticipatory guidance.    ROS: see hpi  Gen: no fevers/chills  Pulm: no sob, no cough  CV: no  "chest pain, no palpitations, no edema  GI: no nausea/vomiting, no diarrhea  Skin: no rash    Objective:   Exam:  BP (!) 138/90 (BP Location: Left arm, Patient Position: Sitting, BP Cuff Size: Adult)   Pulse (!) 122   Temp 36.8 °C (98.3 °F) (Temporal)   Resp 18   Ht 1.803 m (5' 11\")   Wt (!) 129 kg (283 lb 9.6 oz)   SpO2 100%   BMI 39.55 kg/m²    Body mass index is 39.55 kg/m².    Gen: Alert and oriented, No apparent distress.  HEENT: Head atraumatic, normocephalic. Pupils equal and round.  Neck: Neck is supple without lymphadenopathy.   Lungs: Normal effort, CTA bilaterally, no wheezes, rhonchi, or rales  CV: Regular rate and rhythm. No murmurs, rubs, or gallops.  ABD: +BS. Non-tender, non-distended. No rebound, rigidity, or guarding.  Ext: No clubbing, cyanosis, edema. Numerous skin moles noted.    Assessment & Plan:     32 y.o. male with the following -     1. Wellness examination  PMH/PSH/FH/Social history reviewed.  Vaccinations discussed.  Previous records and labs reviewed. Discussed age appropriate anticipatory guidance.    2. Class 2 severe obesity due to excess calories with serious comorbidity and body mass index (BMI) of 39.0 to 39.9 in adult (HCC)  Please continue working on lifestyle modifications. This includes accumulation of 150 minutes or more of moderate-intensity activity each week as tolerated and a healthy diet that is low in saturated fat, sodium, and cholesterol, such as the mediterranean diet that is typically high in fruits, vegetables, whole grains, beans, nuts, and seeds, includes olive oil as an important source of monounsaturated fat, DASH diet, and/or also consider a plant-based diet.  Please also increase your dietary fiber intake to 25 to 30 g a day and limit added sugar to <30 g/day.    3. Elevated blood pressure reading  Recommend DASH diet, exercise as tolerated. Monitor Blood Pressure at home and report any consistent readings above >140/90. Seek medical help/ER if chest " pain, palpitations, shortness of breath, headache, dizziness.   - URINALYSIS,CULTURE IF INDICATED; Future    4. Neutrophilia  Recheck in 2 weeks.  - HEMOGLOBINOPATHY PROFILE; Future  - CBC WITH DIFFERENTIAL; Future  - JAK2 GENE MUT RFLX CALR RFLX MPL; Future    5. Dyslipidemia  Please continue working on lifestyle modifications. This includes accumulation of 150 minutes or more of moderate-intensity activity each week as tolerated and a healthy diet that is low in saturated fat, sodium, and cholesterol, such as the mediterranean diet that is typically high in fruits, vegetables, whole grains, beans, nuts, and seeds, includes olive oil as an important source of monounsaturated fat, DASH diet, and/or also consider a plant-based diet.  Please also increase your dietary fiber intake to 25 to 30 g a day and limit added sugar to <30 g/day.    6. Vitamin D deficiency  Continue weekly supplementation    7. Erythrocytosis  Rule out thalassemia, may also be related to sleep apnea.  Check labs listed below in 2 weeks.  - HEMOGLOBINOPATHY PROFILE; Future  - CBC WITH DIFFERENTIAL; Future  - IRON/TOTAL IRON BIND; Future  - FERRITIN; Future  - JAK2 GENE MUT RFLX CALR RFLX MPL; Future    8. Microcytosis  - HEMOGLOBINOPATHY PROFILE; Future  - CBC WITH DIFFERENTIAL; Future  - IRON/TOTAL IRON BIND; Future  - FERRITIN; Future    9. Alkaline phosphatase elevation  - US-ABDOMEN COMPLETE SURVEY; Future  - URINALYSIS,CULTURE IF INDICATED; Future  - ALKALINE PHOSPHATASE ISOENZYMES; Future    10. Chronic cough  Await chest x-ray.  Schedule PFT.    11. Snoring    12. Congenital foot abnormality  - cyclobenzaprine (FLEXERIL) 10 mg Tab; Take 1 Tablet by mouth 3 times a day as needed for Moderate Pain or Muscle Spasms.  Dispense: 30 Tablet; Refill: 0  - meloxicam (MOBIC) 7.5 MG Tab; Take 1 Tablet by mouth 1 time a day as needed for Inflammation or Moderate Pain.  Dispense: 30 Tablet; Refill: 0    13. Numerous moles  - Referral to  Dermatology    14. Tuberculosis screening  - Quantiferon Gold Plus TB (4 tube); Future    15. Need for vaccination  - INFLUENZA VACCINE QUAD INJ (PF)    Return in about 3 weeks (around 3/10/2023) for Blood pressure check, Follow-up labs/tests.    Melody Kumar) Efrain REBOLLAR  Physician Assistant Certified  [facility]    Please note that this dictation was created using voice recognition software. I have made every reasonable attempt to correct obvious errors, but I expect that there are errors of grammar and possibly content that I did not discover before finalizing the note.

## 2023-02-18 NOTE — ASSESSMENT & PLAN NOTE
Chronic, concern for sleep apnea.  Has appointment set with sleep specialist in June.  Trying to reschedule to get an earlier appointment.

## 2023-02-18 NOTE — ASSESSMENT & PLAN NOTE
Chronic, overall improved with the addition of Singulair.  Has not scheduled PFT yet.  Has chest x-ray scheduled for next month.

## 2023-02-18 NOTE — PATIENT INSTRUCTIONS
Recommend DASH diet, exercise as tolerated. Monitor Blood Pressure at home and report any consistent readings above >140/90. Seek medical help/ER if chest pain, palpitations, shortness of breath, headache, dizziness.        It was a pleasure meeting with you today at [facility]!    Your medical history/records and medications were reviewed today.     UPDATE on RenovoRxhart Results: If you have blood work, and/or imaging studies, or any other test or procedure completed, you will have access to results as soon as they become available in MyChart. Recently, these results will be available for review at the same time that your provider is able to see results!    This will likely mean you will see a result before your provider has had a chance to review and discuss with you.  Some results or care notes may be hard to understand and may be serious in nature.    We look at every result and your provider will contact you to explain what they mean and discuss appropriate next steps. Please allow for at least 72 business hours for chart and result review.     We prefer that you wait for your care team to contact you with your results.  Often, your provider will discuss your results with you at your next appointment. We look forward to continuing to partner with you in your care.    Please review my practice information below:    If you have any prescription refill requests, please send them via Assmbly or discuss with your provider at the start of your office visits. Please allow 3-5 business days for lab and testing review and you will be contacted via Assmbly with those results, or if advised to make a follow up appointment regarding those results, then please do so.     Once resulted, your lab/test/imaging results will show up automatically in your MyChart. Please wait for my interpretation and recommendations prior to viewing your results to avoid any unnecessary confusion or misinterpretation. I will address  all of the lab values that I interpret as abnormal and message you accordingly on your Picturaehart. I will always send you a message about your results even if they are normal. If you do not hear back from me within 5-7 business days after completing your tests, then please send me a message on AdsWizz so I can obtain your results (especially if you went to an outside lab or imaging center - [facility], [facility], etc).     If you have any additional questions or concerns beyond my interpretation of your results, please make an appointment with me to discuss in further detail.    Please only use the AdsWizz messaging system for questions regarding your most recent appointment or if advised to use otherwise (glucose or blood pressure reporting).     If you have any new problems or concerns, you must make an appointment to discuss. This includes any referral requests, lab requests (unless advised to notify me for pre-appt labs), medication side effects, or request for medication adjustments.     Please arrive 15 minutes prior to your appointment time to complete your check-in and intake with the medical assistant.      Thank you,    Melody Zuniga PA-C (Baker)  Physician Assistant Certified  [facility]    -----------------------------------------------------------------    Attn: Patients of [facility]:    In an effort to continue to provide excellent and efficient care to our patients, it is vital that we continue to use our resources appropriately. With that, this is a reminder that AdsWizz is used for prescription refill requests, test results, virtual visits, and chart review only.     Any new questions, concerns/conditions, lab/imaging requests, medication adjustments, new prescriptions, or referral requests do require an appointment (virtually or in person), unless discussed otherwise at your most recent appointment.     Thank you for your understanding,    [facility]

## 2023-02-23 PROCEDURE — 90686 IIV4 VACC NO PRSV 0.5 ML IM: CPT | Performed by: PHYSICIAN ASSISTANT

## 2023-02-23 PROCEDURE — 90471 IMMUNIZATION ADMIN: CPT | Performed by: PHYSICIAN ASSISTANT

## 2023-03-08 ENCOUNTER — HOSPITAL ENCOUNTER (OUTPATIENT)
Dept: PULMONOLOGY | Facility: MEDICAL CENTER | Age: 33
End: 2023-03-08
Attending: PHYSICIAN ASSISTANT
Payer: COMMERCIAL

## 2023-03-08 ENCOUNTER — HOSPITAL ENCOUNTER (OUTPATIENT)
Dept: RADIOLOGY | Facility: MEDICAL CENTER | Age: 33
End: 2023-03-08
Attending: PHYSICIAN ASSISTANT
Payer: COMMERCIAL

## 2023-03-08 DIAGNOSIS — Z86.16 HISTORY OF COVID-19: ICD-10-CM

## 2023-03-08 DIAGNOSIS — R05.1 ACUTE COUGH: ICD-10-CM

## 2023-03-08 PROCEDURE — 94729 DIFFUSING CAPACITY: CPT

## 2023-03-08 PROCEDURE — 94060 EVALUATION OF WHEEZING: CPT | Mod: 26 | Performed by: INTERNAL MEDICINE

## 2023-03-08 PROCEDURE — 94726 PLETHYSMOGRAPHY LUNG VOLUMES: CPT | Mod: 26 | Performed by: INTERNAL MEDICINE

## 2023-03-08 PROCEDURE — 94726 PLETHYSMOGRAPHY LUNG VOLUMES: CPT

## 2023-03-08 PROCEDURE — 94060 EVALUATION OF WHEEZING: CPT

## 2023-03-08 PROCEDURE — 71046 X-RAY EXAM CHEST 2 VIEWS: CPT

## 2023-03-08 PROCEDURE — 94729 DIFFUSING CAPACITY: CPT | Mod: 26 | Performed by: INTERNAL MEDICINE

## 2023-03-08 RX ADMIN — Medication 2.5 MG: at 13:18

## 2023-03-08 ASSESSMENT — PULMONARY FUNCTION TESTS
FEV1_LLN: 3.78
FEV1/FVC_PERCENT_PREDICTED: 108
FEV1/FVC_PERCENT_LLN: 68
FEV1_PREDICTED: 4.53
FEV1/FVC_PERCENT_PREDICTED: 106
FVC_PERCENT_PREDICTED: 78
FEV1/FVC_PERCENT_PREDICTED: 109
FEV1/FVC_PERCENT_LLN: 68
FEV1/FVC_PERCENT_CHANGE: 2
FEV1_PERCENT_PREDICTED: 84
FVC_LLN: 4.64
FEV1/FVC: 88.84
FEV1/FVC: 89
FVC: 4.3
FEV1/FVC_PERCENT_PREDICTED: 81
FVC_PREDICTED: 5.56
FEV1/FVC: 87
FEV1_PERCENT_PREDICTED: 83
FVC_PERCENT_PREDICTED: 77
FEV1: 3.82
FEV1_PERCENT_CHANGE: -1
FEV1_LLN: 3.78
FVC_LLN: 4.64
FEV1/FVC_PERCENT_CHANGE: -100
FEV1/FVC_PERCENT_PREDICTED: 106
FEV1/FVC_PREDICTED: 82
FVC: 4.34
FEV1: 3.78
FEV1/FVC: 87
FEV1_PERCENT_CHANGE: 1

## 2023-03-10 NOTE — PROCEDURES
DATE OF SERVICE:  03/08/2023     PULMONARY FUNCTION TEST INTERPRETATION     IMPRESSION:  1.  There is no obstruction.  2.  There is no bronchodilator response.  3.  Lung volumes are normal.  4.  DLCO is normal.  5.  Flow volume loop appears normal.     Normal PFTs.        ______________________________  DO SURY Iglesias/CHELSEA    DD:  03/08/2023 13:29  DT:  03/08/2023 17:03    Job#:  [id number]

## 2023-04-30 ENCOUNTER — HOSPITAL ENCOUNTER (OUTPATIENT)
Dept: RADIOLOGY | Facility: MEDICAL CENTER | Age: 33
End: 2023-04-30
Attending: PHYSICIAN ASSISTANT
Payer: COMMERCIAL

## 2023-04-30 DIAGNOSIS — R74.8 ALKALINE PHOSPHATASE ELEVATION: ICD-10-CM

## 2023-04-30 PROCEDURE — 76700 US EXAM ABDOM COMPLETE: CPT

## 2023-05-04 ENCOUNTER — HOSPITAL ENCOUNTER (OUTPATIENT)
Dept: LAB | Facility: MEDICAL CENTER | Age: 33
End: 2023-05-04
Attending: PHYSICIAN ASSISTANT
Payer: COMMERCIAL

## 2023-05-13 ENCOUNTER — HOSPITAL ENCOUNTER (OUTPATIENT)
Dept: LAB | Facility: MEDICAL CENTER | Age: 33
End: 2023-05-13
Attending: PHYSICIAN ASSISTANT
Payer: COMMERCIAL

## 2023-05-13 DIAGNOSIS — D72.9 NEUTROPHILIA: ICD-10-CM

## 2023-05-13 DIAGNOSIS — R71.8 MICROCYTOSIS: ICD-10-CM

## 2023-05-13 DIAGNOSIS — R03.0 ELEVATED BLOOD PRESSURE READING: ICD-10-CM

## 2023-05-13 DIAGNOSIS — D75.1 ERYTHROCYTOSIS: ICD-10-CM

## 2023-05-13 DIAGNOSIS — Z11.1 TUBERCULOSIS SCREENING: ICD-10-CM

## 2023-05-13 DIAGNOSIS — R74.8 ALKALINE PHOSPHATASE ELEVATION: ICD-10-CM

## 2023-05-13 LAB
APPEARANCE UR: CLEAR
BASOPHILS # BLD AUTO: 0.6 % (ref 0–1.8)
BASOPHILS # BLD: 0.07 K/UL (ref 0–0.12)
BILIRUB UR QL STRIP.AUTO: NEGATIVE
COLOR UR: YELLOW
EOSINOPHIL # BLD AUTO: 0.25 K/UL (ref 0–0.51)
EOSINOPHIL NFR BLD: 2.3 % (ref 0–6.9)
ERYTHROCYTE [DISTWIDTH] IN BLOOD BY AUTOMATED COUNT: 43.9 FL (ref 35.9–50)
FERRITIN SERPL-MCNC: 74.5 NG/ML (ref 22–322)
GLUCOSE UR STRIP.AUTO-MCNC: NEGATIVE MG/DL
HCT VFR BLD AUTO: 51.4 % (ref 42–52)
HGB BLD-MCNC: 15.5 G/DL (ref 14–18)
IMM GRANULOCYTES # BLD AUTO: 0.08 K/UL (ref 0–0.11)
IMM GRANULOCYTES NFR BLD AUTO: 0.7 % (ref 0–0.9)
IRON SATN MFR SERPL: 13 % (ref 15–55)
IRON SERPL-MCNC: 41 UG/DL (ref 50–180)
KETONES UR STRIP.AUTO-MCNC: NEGATIVE MG/DL
LEUKOCYTE ESTERASE UR QL STRIP.AUTO: NEGATIVE
LYMPHOCYTES # BLD AUTO: 1.68 K/UL (ref 1–4.8)
LYMPHOCYTES NFR BLD: 15.4 % (ref 22–41)
MCH RBC QN AUTO: 24.1 PG (ref 27–33)
MCHC RBC AUTO-ENTMCNC: 30.2 G/DL (ref 33.7–35.3)
MCV RBC AUTO: 79.8 FL (ref 81.4–97.8)
MICRO URNS: NORMAL
MONOCYTES # BLD AUTO: 0.77 K/UL (ref 0–0.85)
MONOCYTES NFR BLD AUTO: 7.1 % (ref 0–13.4)
NEUTROPHILS # BLD AUTO: 8.04 K/UL (ref 1.82–7.42)
NEUTROPHILS NFR BLD: 73.9 % (ref 44–72)
NITRITE UR QL STRIP.AUTO: NEGATIVE
NRBC # BLD AUTO: 0 K/UL
NRBC BLD-RTO: 0 /100 WBC
PH UR STRIP.AUTO: 6.5 [PH] (ref 5–8)
PLATELET # BLD AUTO: 404 K/UL (ref 164–446)
PMV BLD AUTO: 10.7 FL (ref 9–12.9)
PROT UR QL STRIP: NEGATIVE MG/DL
RBC # BLD AUTO: 6.44 M/UL (ref 4.7–6.1)
RBC UR QL AUTO: NEGATIVE
SP GR UR STRIP.AUTO: 1.02
TIBC SERPL-MCNC: 317 UG/DL (ref 250–450)
UIBC SERPL-MCNC: 276 UG/DL (ref 110–370)
UROBILINOGEN UR STRIP.AUTO-MCNC: 1 MG/DL
WBC # BLD AUTO: 10.9 K/UL (ref 4.8–10.8)

## 2023-05-13 PROCEDURE — 81219 CALR GENE COM VARIANTS: CPT

## 2023-05-13 PROCEDURE — 36415 COLL VENOUS BLD VENIPUNCTURE: CPT

## 2023-05-13 PROCEDURE — 83550 IRON BINDING TEST: CPT

## 2023-05-13 PROCEDURE — 83021 HEMOGLOBIN CHROMOTOGRAPHY: CPT

## 2023-05-13 PROCEDURE — 85025 COMPLETE CBC W/AUTO DIFF WBC: CPT

## 2023-05-13 PROCEDURE — 83540 ASSAY OF IRON: CPT

## 2023-05-13 PROCEDURE — 86480 TB TEST CELL IMMUN MEASURE: CPT

## 2023-05-13 PROCEDURE — 84080 ASSAY ALKALINE PHOSPHATASES: CPT

## 2023-05-13 PROCEDURE — 84075 ASSAY ALKALINE PHOSPHATASE: CPT

## 2023-05-13 PROCEDURE — 82728 ASSAY OF FERRITIN: CPT

## 2023-05-13 PROCEDURE — 81270 JAK2 GENE: CPT

## 2023-05-13 PROCEDURE — 81338 MPL GENE COMMON VARIANTS: CPT

## 2023-05-13 PROCEDURE — 81003 URINALYSIS AUTO W/O SCOPE: CPT

## 2023-05-15 LAB
GAMMA INTERFERON BACKGROUND BLD IA-ACNC: 0.02 IU/ML
M TB IFN-G BLD-IMP: NEGATIVE
M TB IFN-G CD4+ BCKGRND COR BLD-ACNC: 0 IU/ML
MITOGEN IGNF BCKGRD COR BLD-ACNC: 9.92 IU/ML
QFT TB2 - NIL TBQ2: 0 IU/ML

## 2023-05-16 ENCOUNTER — OFFICE VISIT (OUTPATIENT)
Dept: SLEEP MEDICINE | Facility: MEDICAL CENTER | Age: 33
End: 2023-05-16
Attending: PHYSICIAN ASSISTANT
Payer: COMMERCIAL

## 2023-05-16 VITALS
SYSTOLIC BLOOD PRESSURE: 128 MMHG | OXYGEN SATURATION: 94 % | HEART RATE: 98 BPM | WEIGHT: 283.3 LBS | HEIGHT: 71 IN | BODY MASS INDEX: 39.66 KG/M2 | DIASTOLIC BLOOD PRESSURE: 88 MMHG | RESPIRATION RATE: 16 BRPM

## 2023-05-16 DIAGNOSIS — G47.33 OSA (OBSTRUCTIVE SLEEP APNEA): ICD-10-CM

## 2023-05-16 DIAGNOSIS — R06.83 SNORING: ICD-10-CM

## 2023-05-16 PROCEDURE — 99212 OFFICE O/P EST SF 10 MIN: CPT | Performed by: INTERNAL MEDICINE

## 2023-05-16 PROCEDURE — 3074F SYST BP LT 130 MM HG: CPT | Performed by: INTERNAL MEDICINE

## 2023-05-16 PROCEDURE — 99215 OFFICE O/P EST HI 40 MIN: CPT | Performed by: INTERNAL MEDICINE

## 2023-05-16 PROCEDURE — 3079F DIAST BP 80-89 MM HG: CPT | Performed by: INTERNAL MEDICINE

## 2023-05-16 ASSESSMENT — ENCOUNTER SYMPTOMS: TIME GOING TO BED: 10

## 2023-05-16 ASSESSMENT — FIBROSIS 4 INDEX: FIB4 SCORE: 0.39

## 2023-05-16 NOTE — PROGRESS NOTES
"CC: Evaluation of snoring    HPI:  Chao Pereira Jr. is a 32 y.o. male  and  for the [facility] indly referred by Melody Zuniga P.A.-C.  who elicited a history of snoring.    He briefly describes his sleep problem as \"snoring for as long as I can remember.\"  He rates the severity as 5 out of 10 as he is always tired.    He has a regular bed partner.  He estimates his sleep latency to be 5 minutes.  He may watch TV just prior to turning out the lights and attempting to sleep.  He reports 3-4 nocturnal awakenings 1 episode of nocturia.    Symptoms include waking up too early in the morning and being unable to return to sleep, sleepiness during the day, and too little sleep at night.  He uses energy drinks to improve alertness with a large amount of caffeine.    He snores very loudly.    Past medical history significant for born premie,  hospitalized for severe covid pneumonia FOR 11 DAYS OR SO, obesity, dyslipidemia, cough, vitamin D deficiency, never smoker, congenital foot abnormality, postnasal drip, snoring, hypochromia and microcytosis.            Patient Active Problem List    Diagnosis Date Noted    SUSAN (obstructive sleep apnea) 05/16/2023    Elevated blood pressure reading 02/17/2023    Erythrocytosis 02/17/2023    Microcytosis 02/17/2023    Alkaline phosphatase elevation 02/17/2023    Neutrophilia 02/17/2023    Chronic cough 01/05/2023    Snoring 01/05/2023    Vitamin D deficiency 01/05/2023    Dyslipidemia     Post-nasal drip 12/01/2021    H/O seasonal allergies 03/23/2021    Class 2 severe obesity due to excess calories with serious comorbidity and body mass index (BMI) of 39.0 to 39.9 in adult ([facility]) 03/23/2021    Congenital foot abnormality 03/23/2021       Past Medical History:   Diagnosis Date    Acute respiratory failure due to COVID-19 ([facility]) 09/10/2021    Allergy     Dyslipidemia     History of COVID-19 12/3/2021    Morning headache     Pneumonia due to COVID-19 " virus 12/01/2021    Wears glasses         Past Surgical History:   Procedure Laterality Date    OTHER  2005    triple arthrodesis    VASECTOMY         Family History   Problem Relation Age of Onset    Alcohol abuse Mother     Hypertension Father     Drug abuse Sister     Psychiatric Illness Sister         schizophrenia    Drug abuse Sister     Cancer Neg Hx     Diabetes Neg Hx     Heart Disease Neg Hx     Stroke Neg Hx        Social History     Socioeconomic History    Marital status:      Spouse name: Not on file    Number of children: Not on file    Years of education: Not on file    Highest education level: Associate degree: academic program   Occupational History    Occupation: Cardiac ICU - tech   Tobacco Use    Smoking status: Never    Smokeless tobacco: Never   Vaping Use    Vaping Use: Never used   Substance and Sexual Activity    Alcohol use: Not Currently     Comment: Less than 1/month    Drug use: Never    Sexual activity: Yes     Partners: Female     Comment: has 3 kids   Other Topics Concern    Not on file   Social History Narrative    , 3 kids     and      Social Determinants of Health     Financial Resource Strain: Low Risk  (2/17/2023)    Overall Financial Resource Strain (CARDIA)     Difficulty of Paying Living Expenses: Not hard at all   Food Insecurity: No Food Insecurity (2/17/2023)    Hunger Vital Sign     Worried About Running Out of Food in the Last Year: Never true     Ran Out of Food in the Last Year: Never true   Transportation Needs: No Transportation Needs (2/17/2023)    PRAPARE - Transportation     Lack of Transportation (Medical): No     Lack of Transportation (Non-Medical): No   Physical Activity: Insufficiently Active (2/17/2023)    Exercise Vital Sign     Days of Exercise per Week: 1 day     Minutes of Exercise per Session: 20 min   Stress: No Stress Concern Present (2/17/2023)    [employer] - Occupational Stress  "Questionnaire     Feeling of Stress : Not at all   Social Connections: Socially Isolated (2/17/2023)    Social Connection and Isolation Panel [NHANES]     Frequency of Communication with Friends and Family: Never     Frequency of Social Gatherings with Friends and Family: Never     Attends Scientology Services: Never     Active Member of Clubs or Organizations: No     Attends Club or Organization Meetings: Never     Marital Status:    Intimate Partner Violence: Not on file   Housing Stability: Low Risk  (2/17/2023)    Housing Stability Vital Sign     Unable to Pay for Housing in the Last Year: No     Number of Places Lived in the Last Year: 1     Unstable Housing in the Last Year: No       Current Outpatient Medications   Medication Sig Dispense Refill    cyclobenzaprine (FLEXERIL) 10 mg Tab Take 1 Tablet by mouth 3 times a day as needed for Moderate Pain or Muscle Spasms. 30 Tablet 0    meloxicam (MOBIC) 7.5 MG Tab Take 1 Tablet by mouth 1 time a day as needed for Inflammation or Moderate Pain. 30 Tablet 0    albuterol 108 (90 Base) MCG/ACT Aero Soln inhalation aerosol INHALE TWO PUFFS BY MOUTH EVERY 4 HOURS AS NEEDED FOR SHORTNESS OF BREATH 18 g 0    fluticasone (FLONASE) 50 MCG/ACT nasal spray Administer 1-2 Sprays into affected nostril(S) every day. Each nostril. 16 g 6    vitamin D2, Ergocalciferol, (DRISDOL) 1.25 MG (19012 UT) Cap capsule TAKE 1 CAPSULE BY MOUTH EVERY 7 DAYS (Patient not taking: Reported on 5/16/2023) 4 Capsule 2    montelukast (SINGULAIR) 10 MG Tab TAKE 1 TABLET BY MOUTH EVERY DAY (Patient not taking: Reported on 5/16/2023) 90 Tablet 1    acetaminophen (TYLENOL) 500 MG Tab Take 1 Tablet by mouth every four hours as needed for Mild Pain or Moderate Pain. (Patient not taking: Reported on 5/16/2023)       No current facility-administered medications for this visit.    \"CURRENT RX\"    ALLERGIES: Patient has no known allergies.    ROS    Constitutional: Denies fever, chills, sweats,  weight " "loss, fatigue.  Eyes: Denies vision loss, pain, drainage, double vision, glasses.  Ears/Nose/Mouth/Throat: Denies earache, difficulty hearing, rhinitis/nasal congestion, injury, recurrent sore throat, persistent hoarseness, decayed teeth/toothaches, ringing or buzzing in the ears.  Cardiovascular: Denies chest pain, tightness, palpitations, swelling in legs/feet, fainting, difficulty breathing when lying down but gets better when sitting up.   Respiratory: Denies shortness of breath, cough, sputum, wheezing, painful breathing, coughing up blood.   Sleep: per HPI  Gastrointestinal: Denies  difficulty swallowing, nausea, abdominal pain, diarrhea, constipation, heartburn.  Genitourinary: Denies  blood in urine, discharge, frequent urination.   Musculoskeletal: Denies painful joints, sore muscles, back pain.   Integumentary: Denies rashes, lumps, color changes.   Neurological: Denies frequent headaches,weakness, dizziness.    PHYSICAL EXAM  Obese.    /88 (BP Location: Left arm, Patient Position: Sitting, BP Cuff Size: Adult)   Pulse 98   Resp 16   Ht 1.803 m (5' 11\")   Wt (!) 129 kg (283 lb 4.8 oz)   SpO2 94%   BMI 39.51 kg/m²   Appearance: Well-nourished, well-developed, no acute distress  Eyes:  PERRLA, EOMI  ENMT: Mallampati 3  Neck: Supple, trachea midline, no masses  Respiratory effort:  No intercostal retractions or use of accessory muscles  Lung auscultation:  No wheezes rhonchi rubs or rales  Cardiac: No murmurs, rubs, or gallops; regular rhythm, normal rate; no edema  Abdomen:  No tenderness, no organomegaly obese  Musculoskeletal:  Grossly normal; gait and station normal; digits and nails normal  Skin:  No rashes, petechiae, cyanosis  Neurologic: without focal signs; oriented to person, time, place, and purpose; judgement intact  Psychiatric:  No depression, anxiety, agitation        Medical Decision Making:  The medical record was reviewed in its entirety including the referral notes, records from " primary care, consultants notes, hospital records, labs, imaging, microbiology, immunology, and immunizations.  Care gaps identified and reviewed with the patient.    Diagnostic and titration nocturnal polysomnograms, home sleep apnea tests, continuous nocturnal oximetry results, multiple sleep latency tests, and compliance reports reviewed.        1. SUSAN (obstructive sleep apnea)    2. Snoring      PLAN:   The patient has signs and symptoms consistent with obstructive sleep apnea hypopnea syndrome. Will schedule a nocturnal polysomnogram or a home sleep apnea test depending on signs and symptoms as well as insurance restrictions.    The risks of untreated sleep apnea were discussed with the patient at length. Patients with SUSAN are at increased risk of cardiovascular disease including systemic arterial hypertension, pulmonary arterial hypertension (transient or fixed), TIA, and an elevated risk of stroke, heart attack, sudden death, or arrhythmia between the hours of 11 PM and 6 AM. SUSAN patients have an increased risk of motor vehicle accidents, type 2 diabetes, GERD, repetitive mechanical trauma to pharyngeal muscles with inflammation and denervation, frequent EEG arousals leading to nonrestorative sleep, excessive daytime sleepiness, fatigue, depression, poor pain control, irritability, and lower quality of life.  The patient was advised to avoid driving a motor vehicle when drowsy.    Positive airway pressure will favorably impact many of the adverse conditions and effects provoked by SUSAN.    Have advised the patient to follow up with the appropriate healthcare practitioners for all other medical problems and issues. Discussed blood pressure management if needed. Discussed depression screening.            Return for after sleep study.    Total time 45 minutes

## 2023-05-17 LAB
ALP BONE SERPL-CCNC: 58 U/L (ref 0–55)
ALP ISOS SERPL HS-CCNC: 0 U/L
ALP LIVER SERPL-CCNC: 79 U/L (ref 0–94)
ALP SERPL-CCNC: 137 U/L (ref 40–120)

## 2023-05-18 LAB
HGB A1 MFR BLD: 96.9 % (ref 95–97.9)
HGB A2 MFR BLD: 2.8 % (ref 2–3.5)
HGB C MFR BLD: 0 % (ref 0–0)
HGB E MFR BLD: 0 % (ref 0–0)
HGB F MFR BLD: 0.3 % (ref 0–2.1)
HGB FRACT BLD ELPH-IMP: NORMAL
HGB OTHER MFR BLD: 0 % (ref 0–0)
HGB S BLD QL SOLY: NORMAL
HGB S MFR BLD: 0 % (ref 0–0)
PATH INTERP BLD-IMP: NORMAL

## 2023-05-22 LAB
JAK2 P.V617F BLD/T QL: NOT DETECTED
SPECIMEN SOURCE: NORMAL

## 2023-05-24 LAB — GENE XXX MUT ANL BLD/T: NOT DETECTED

## 2023-05-26 ENCOUNTER — TELEMEDICINE (OUTPATIENT)
Dept: MEDICAL GROUP | Facility: IMAGING CENTER | Age: 33
End: 2023-05-26
Payer: COMMERCIAL

## 2023-05-26 VITALS — BODY MASS INDEX: 40.37 KG/M2 | TEMPERATURE: 98.3 F | WEIGHT: 282 LBS | HEIGHT: 70 IN

## 2023-05-26 DIAGNOSIS — R09.82 POST-NASAL DRIP: ICD-10-CM

## 2023-05-26 DIAGNOSIS — D75.1 ERYTHROCYTOSIS: ICD-10-CM

## 2023-05-26 DIAGNOSIS — Z88.9 H/O SEASONAL ALLERGIES: ICD-10-CM

## 2023-05-26 DIAGNOSIS — K21.9 LARYNGOPHARYNGEAL REFLUX (LPR): ICD-10-CM

## 2023-05-26 DIAGNOSIS — R71.8 MICROCYTOSIS: ICD-10-CM

## 2023-05-26 DIAGNOSIS — D72.9 NEUTROPHILIA: ICD-10-CM

## 2023-05-26 LAB — MPL P.W515 BLD/T QL: NOT DETECTED

## 2023-05-26 PROCEDURE — 99214 OFFICE O/P EST MOD 30 MIN: CPT | Mod: 95 | Performed by: PHYSICIAN ASSISTANT

## 2023-05-26 RX ORDER — FLUTICASONE PROPIONATE 50 MCG
1-2 SPRAY, SUSPENSION (ML) NASAL DAILY
Qty: 16 G | Refills: 6 | Status: SHIPPED | OUTPATIENT
Start: 2023-05-26

## 2023-05-26 RX ORDER — MONTELUKAST SODIUM 10 MG/1
10 TABLET ORAL DAILY
Qty: 90 TABLET | Refills: 1 | Status: SHIPPED | OUTPATIENT
Start: 2023-05-26 | End: 2024-02-26

## 2023-05-26 RX ORDER — PANTOPRAZOLE SODIUM 40 MG/1
40 TABLET, DELAYED RELEASE ORAL DAILY
Qty: 90 TABLET | Refills: 1 | Status: SHIPPED | OUTPATIENT
Start: 2023-05-26 | End: 2023-10-24

## 2023-05-26 ASSESSMENT — FIBROSIS 4 INDEX: FIB4 SCORE: 0.39

## 2023-05-26 NOTE — PROGRESS NOTES
Virtual Visit: Established Patient   This visit was conducted via Zoom using secure and encrypted videoconferencing technology. The patient was in a private location in the state of [place].    The patient's identity was confirmed and verbal consent was obtained for this virtual visit.    Subjective:   CC:   Chief Complaint   Patient presents with    Follow-Up     labs       Chao Pereira Jr. is a 32 y.o. male presenting for evaluation and management of:    Laryngopharyngeal reflux (LPR)  Chronic, patient states that he took one of his wife's pantoprazole and had significant improvement in his reflux and chronic cough.  Interested in his own prescription.    Erythrocytosis  Patient with chronic erythrocytosis with leukocytosis and microcytosis.  Hemoglobin electrophoresis negative.  Negative JAK2 testing.    Post-nasal drip  Chronic, controlled stable on Singulair and Flonase, requesting refill.      ROS   Denies any recent fevers or chills. No nausea or vomiting. No chest pains or shortness of breath.     No Known Allergies    Current medicines (including changes today)  Current Outpatient Medications   Medication Sig Dispense Refill    pantoprazole (PROTONIX) 40 MG Tablet Delayed Response Take 1 Tablet by mouth every day. 90 Tablet 1    montelukast (SINGULAIR) 10 MG Tab Take 1 Tablet by mouth every day. 90 Tablet 1    fluticasone (FLONASE) 50 MCG/ACT nasal spray Administer 1-2 Sprays into affected nostril(S) every day. Each nostril. 16 g 6    cyclobenzaprine (FLEXERIL) 10 mg Tab Take 1 Tablet by mouth 3 times a day as needed for Moderate Pain or Muscle Spasms. 30 Tablet 0    meloxicam (MOBIC) 7.5 MG Tab Take 1 Tablet by mouth 1 time a day as needed for Inflammation or Moderate Pain. 30 Tablet 0    albuterol 108 (90 Base) MCG/ACT Aero Soln inhalation aerosol INHALE TWO PUFFS BY MOUTH EVERY 4 HOURS AS NEEDED FOR SHORTNESS OF BREATH 18 g 0    vitamin D2, Ergocalciferol, (DRISDOL) 1.25 MG (88328 UT) Cap  "capsule TAKE 1 CAPSULE BY MOUTH EVERY 7 DAYS (Patient not taking: Reported on 5/16/2023) 4 Capsule 2    acetaminophen (TYLENOL) 500 MG Tab Take 1 Tablet by mouth every four hours as needed for Mild Pain or Moderate Pain. (Patient not taking: Reported on 5/16/2023)       No current facility-administered medications for this visit.       Patient Active Problem List    Diagnosis Date Noted    SUSAN (obstructive sleep apnea) 05/16/2023    Elevated blood pressure reading 02/17/2023    Erythrocytosis 02/17/2023    Microcytosis 02/17/2023    Alkaline phosphatase elevation 02/17/2023    Neutrophilia 02/17/2023    Laryngopharyngeal reflux (LPR) 01/05/2023    Snoring 01/05/2023    Vitamin D deficiency 01/05/2023    Dyslipidemia     Post-nasal drip 12/01/2021    H/O seasonal allergies 03/23/2021    Class 2 severe obesity due to excess calories with serious comorbidity and body mass index (BMI) of 39.0 to 39.9 in adult ([facility]) 03/23/2021    Congenital foot abnormality 03/23/2021       Family History   Problem Relation Age of Onset    Alcohol abuse Mother     Hypertension Father     Drug abuse Sister     Psychiatric Illness Sister         schizophrenia    Drug abuse Sister     Cancer Neg Hx     Diabetes Neg Hx     Heart Disease Neg Hx     Stroke Neg Hx        He  has a past medical history of Acute respiratory failure due to COVID-19 ([facility]) (09/10/2021), Allergy, Dyslipidemia, History of COVID-19 (12/3/2021), Morning headache, Pneumonia due to COVID-19 virus (12/01/2021), and Wears glasses.  He  has a past surgical history that includes other (2005) and vasectomy.         Objective:   Temp 36.8 °C (98.3 °F) (Temporal) Comment: 2/17/23  Ht 1.778 m (5' 10\")   Wt (!) 128 kg (282 lb)   BMI 40.46 kg/m²   RR 12    Physical Exam:  Constitutional: Alert, no distress, well-groomed.  Skin: No rashes in visible areas.  Eye: Round. Conjunctiva clear, lids normal. No icterus.   ENMT: Lips pink without lesions, good dentition, moist mucous " membranes. Phonation normal.  Neck: No masses, no thyromegaly. Moves freely without pain.  Respiratory: Unlabored respiratory effort, no cough or audible wheeze  Psych: Alert and oriented x3, normal affect and mood.     Assessment and Plan:   The following treatment plan was discussed:     1. Neutrophilia  - Referral to Hematology Oncology    2. Erythrocytosis  - Referral to Hematology Oncology    3. Microcytosis  - Referral to Hematology Oncology    4. Laryngopharyngeal reflux (LPR)  Chronic, will switch omeprazole to pantoprazole 40 mg daily.  - pantoprazole (PROTONIX) 40 MG Tablet Delayed Response; Take 1 Tablet by mouth every day.  Dispense: 90 Tablet; Refill: 1    5. H/O seasonal allergies  Chronic, controlled and stable. Continue current regimen -   - montelukast (SINGULAIR) 10 MG Tab; Take 1 Tablet by mouth every day.  Dispense: 90 Tablet; Refill: 1  - fluticasone (FLONASE) 50 MCG/ACT nasal spray; Administer 1-2 Sprays into affected nostril(S) every day. Each nostril.  Dispense: 16 g; Refill: 6    6. Post-nasal drip  Chronic, controlled and stable. Continue current regimen -   - montelukast (SINGULAIR) 10 MG Tab; Take 1 Tablet by mouth every day.  Dispense: 90 Tablet; Refill: 1  - fluticasone (FLONASE) 50 MCG/ACT nasal spray; Administer 1-2 Sprays into affected nostril(S) every day. Each nostril.  Dispense: 16 g; Refill: 6      Follow-up: Return if symptoms worsen or fail to improve.         Melody Zuniga PA-C ([place]aker)  Physician Assistant Certified  [facility]    Please note that this dictation was created using voice recognition software. I have made every reasonable attempt to correct obvious errors, but I expect that there are errors of grammar and possibly content that I did not discover before finalizing the note.

## 2023-05-27 NOTE — PATIENT INSTRUCTIONS
It was a pleasure meeting with you today at [facility]!    Your medical history/records and medications were reviewed today.     UPDATE on MyChart Results: If you have blood work, and/or imaging studies, or any other test or procedure completed, you will have access to results as soon as they become available in MyChart. Recently, these results will be available for review at the same time that your provider is able to see results!    This will likely mean you will see a result before your provider has had a chance to review and discuss with you.  Some results or care notes may be hard to understand and may be serious in nature.    We look at every result and your provider will contact you to explain what they mean and discuss appropriate next steps. Please allow for at least 72 business hours for chart and result review.     We prefer that you wait for your care team to contact you with your results.  Often, your provider will discuss your results with you at your next appointment. We look forward to continuing to partner with you in your care.    Please review my practice information below:    If you have any prescription refill requests, please send them via Emefcy or discuss with your provider at the start of your office visits. Please allow 3-5 business days for lab and testing review and you will be contacted via Emefcy with those results, or if advised to make a follow up appointment regarding those results, then please do so.     Once resulted, your lab/test/imaging results will show up automatically in your MyChart. Please wait for my interpretation and recommendations prior to viewing your results to avoid any unnecessary confusion or misinterpretation. I will address all of the lab values that I interpret as abnormal and message you accordingly on your MyChart. I will always send you a message about your results even if they are normal. If you do not hear back from me within 5-7 business  days after completing your tests, then please send me a message on [employer] so I can obtain your results (especially if you went to an outside lab or imaging center - [facility], [facility], etc).     If you have any additional questions or concerns beyond my interpretation of your results, please make an appointment with me to discuss in further detail.    Please only use the [employer] messaging system for questions regarding your most recent appointment or if advised to use otherwise (glucose or blood pressure reporting).     If you have any new problems or concerns, you must make an appointment to discuss. This includes any referral requests, lab requests (unless advised to notify me for pre-appt labs), medication side effects, or request for medication adjustments.     Please arrive 15 minutes prior to your appointment time to complete your check-in and intake with the medical assistant.      Thank you,    Melody Zuniga PA-C (Baker)  Physician Assistant Certified  [facility]    -----------------------------------------------------------------    Attn: Patients of [facility]:    In an effort to continue to provide excellent and efficient care to our patients, it is vital that we continue to use our resources appropriately. With that, this is a reminder that [employer] is used for prescription refill requests, test results, virtual visits, and chart review only.     Any new questions, concerns/conditions, lab/imaging requests, medication adjustments, new prescriptions, or referral requests do require an appointment (virtually or in person), unless discussed otherwise at your most recent appointment.     Thank you for your understanding,    [facility]

## 2023-05-27 NOTE — ASSESSMENT & PLAN NOTE
Patient with chronic erythrocytosis with leukocytosis and microcytosis.  Hemoglobin electrophoresis negative.  Negative JAK2 testing.

## 2023-05-27 NOTE — ASSESSMENT & PLAN NOTE
Chronic, patient states that he took one of his wife's pantoprazole and had significant improvement in his reflux and chronic cough.  Interested in his own prescription.

## 2023-06-06 ENCOUNTER — HOME STUDY (OUTPATIENT)
Dept: SLEEP MEDICINE | Facility: MEDICAL CENTER | Age: 33
End: 2023-06-06
Attending: INTERNAL MEDICINE
Payer: COMMERCIAL

## 2023-06-06 DIAGNOSIS — R06.83 SNORING: ICD-10-CM

## 2023-06-06 DIAGNOSIS — G47.33 OSA (OBSTRUCTIVE SLEEP APNEA): ICD-10-CM

## 2023-06-06 PROCEDURE — G0399 HOME SLEEP TEST/TYPE 3 PORTA: HCPCS | Performed by: INTERNAL MEDICINE

## 2023-06-08 NOTE — PROCEDURES
Interpretation:    This home sleep study was performed on 2023 using a ResMed ApneaLink Air type III device.  The total recording time duration was 9 hours 30 minutes minutes, the monitoring time (flow) duration was 9 hours 18 minutes minutes, and the oxygen saturation evaluation duration was 9 hours 17 minutes minutes.  The overall MONTEZ was 10.6, the supine MONTEZ was 21.1, the nonsupine MONTEZ was 9.2, and the upright MONTEZ was 0.0.  The total number of apneas and hypopneas was 97.  The Cheyne- Moss respiration time was 0 minutes and the percentage was 0%.  The oxygen desaturation index was 11.4 and the patient experienced 106 total desaturations.    The baseline oxygen saturation was 94%, the average oxygen saturation was 91%, and the lowest oxygen saturation was 70%.  The saturations were less than or equal to 88% for 6% or for 30 minutes. The patient experienced 11,095 breaths or 19.8 breaths per minute.  1239 snores were recorded. The minimum heart rate was 43, the average heart rate was 92, and the maximum heart rate was 197 beats per minute.        Assessment:    Mild positional obstructive sleep apnea -overall MONTEZ 10.6  Mild nocturnal desaturation - vianey saturation 70% - saturations less than or equal to 88% for 6% of the TIB  Snorin snores recorded      Recommendation:    If significant signs, symptoms, and or comorbidities warrant, recommend a positive airway pressure titration. Alternative treatments for OSAH include behavioral modification, use of a dental appliance, and nasopharyngeal reconstructive surgery. Behavior modification includes weight loss, eliminating alcohol and sedatives, and avoiding the supine position. If alternative treatments are used, a follow-up diagnostic study is warranted to ensure efficacy.    Clinical correlation is needed.  An empiric trial of auto titrating CPAP may be an acceptable option.          Dr. Truman Read M.D.

## 2023-07-11 ENCOUNTER — APPOINTMENT (OUTPATIENT)
Dept: SLEEP MEDICINE | Facility: MEDICAL CENTER | Age: 33
End: 2023-07-11
Attending: PHYSICIAN ASSISTANT
Payer: COMMERCIAL

## 2023-08-25 ENCOUNTER — OFFICE VISIT (OUTPATIENT)
Dept: SLEEP MEDICINE | Facility: MEDICAL CENTER | Age: 33
End: 2023-08-25
Attending: PHYSICIAN ASSISTANT
Payer: COMMERCIAL

## 2023-08-25 VITALS
WEIGHT: 282 LBS | DIASTOLIC BLOOD PRESSURE: 70 MMHG | BODY MASS INDEX: 40.37 KG/M2 | SYSTOLIC BLOOD PRESSURE: 114 MMHG | HEART RATE: 85 BPM | HEIGHT: 70 IN | RESPIRATION RATE: 16 BRPM | OXYGEN SATURATION: 97 %

## 2023-08-25 DIAGNOSIS — G47.33 OSA (OBSTRUCTIVE SLEEP APNEA): ICD-10-CM

## 2023-08-25 PROCEDURE — 99213 OFFICE O/P EST LOW 20 MIN: CPT | Performed by: PHYSICIAN ASSISTANT

## 2023-08-25 PROCEDURE — 3074F SYST BP LT 130 MM HG: CPT | Performed by: PHYSICIAN ASSISTANT

## 2023-08-25 PROCEDURE — 3078F DIAST BP <80 MM HG: CPT | Performed by: PHYSICIAN ASSISTANT

## 2023-08-25 ASSESSMENT — ENCOUNTER SYMPTOMS
SINUS PAIN: 0
HEADACHES: 0
HEARTBURN: 0
PALPITATIONS: 0
FEVER: 0
COUGH: 1
WHEEZING: 0
WEIGHT LOSS: 0
DIZZINESS: 0
TREMORS: 0
SHORTNESS OF BREATH: 0
SORE THROAT: 0
INSOMNIA: 0
ORTHOPNEA: 0
CHILLS: 0
SPUTUM PRODUCTION: 0

## 2023-08-25 ASSESSMENT — FIBROSIS 4 INDEX: FIB4 SCORE: 0.39

## 2023-08-25 NOTE — PATIENT INSTRUCTIONS
1-reviewed overnight sleep study  2-reviewed risks associated with un/under treated sleep apnea  3-will send order for auto cpap to [facility]-contact info given   4-will need short term follow up appt  5-bring entire device to clinic with 30-60 days of data usage for review  6-strategies for success   Training/acclimation for 3-4 days    min usage   Minimum 4 hours per day and 70% of days

## 2023-08-25 NOTE — PROGRESS NOTES
"Chief Complaint   Patient presents with    Follow-Up     05/16/23 Jacek    Results     SS       HPI:  Chao Pereira Jr. is a 32 y.o. year old male here today for follow-up on sleep study results.  Patient last seen in clinic 5/16/2023 by Dr. Truman Read.  He is a never smoker.    Past Medical History: Chronic fatigue, seasonal allergies, obesity, dyslipidemia, laryngopharyngeal reflux, snoring, erythrocytosis, vitamin D deficiency.  Patient reports having significant COVID 19 related illness requiring hospitalization x10 days and high flow oxygen up to 60 L at one-point declined intubation.  He reports continued improvement.    Vitals:  /70   Pulse 85   Resp 16   Ht 1.778 m (5' 10\")   Wt (!) 128 kg (282 lb)   SpO2 97% on room air and BMI of 40.46 kg/m².    Recent Imaging: Echocardiogram obtained 12/3/2021 demonstrated normal left ventricular chamber size, systolic and diastolic function, mild concentric left ventricular hypertrophy, LVEF estimated 70%.  Normal right ventricular size and systolic function, trace mitral regurgitation, unable to estimate RVSP due to inadequate tricuspid regurgitant jet.    Reviewed overnight home sleep study obtained 6/6/2023 demonstrating mild positional obstructive sleep apnea with overall MONTEZ of 10.6, mild nocturnal desaturation with low oxygen saturation of 70%, sats less than or equal to 88% for 30 minutes.  1239 snores were recorded.  Patient amenable to auto CPAP trial.    Sleep schedule goes to bed 9 PM, wakens 7 AM , and gets up during the night at least once to use the bathroom.   Symptoms denies daytime somnolence, occasional morning headache     Robbins Sleepiness Scale reported as 5/24          Review of Systems   Constitutional:  Positive for malaise/fatigue (moderate). Negative for chills, fever and weight loss.   HENT:  Negative for congestion, hearing loss, nosebleeds, sinus pain, sore throat and tinnitus.    Eyes:         Presc glasses "   Respiratory:  Positive for cough (prob GERD). Negative for sputum production, shortness of breath and wheezing.    Cardiovascular:  Negative for chest pain, palpitations, orthopnea and leg swelling.   Gastrointestinal:  Negative for heartburn.        No dentures, missing one lower left molar, no swallowing issues   Neurological:  Negative for dizziness, tremors and headaches.   Psychiatric/Behavioral:  The patient does not have insomnia.        Past Medical History:   Diagnosis Date    Acute respiratory failure due to COVID-19 (HCC) 09/10/2021    Allergy     Dyslipidemia     History of COVID-19 12/3/2021    Morning headache     Pneumonia due to COVID-19 virus 12/01/2021    Wears glasses        Past Surgical History:   Procedure Laterality Date    OTHER  2005    triple arthrodesis    VASECTOMY         Family History   Problem Relation Age of Onset    Alcohol abuse Mother     Hypertension Father     Drug abuse Sister     Psychiatric Illness Sister         schizophrenia    Drug abuse Sister     Cancer Neg Hx     Diabetes Neg Hx     Heart Disease Neg Hx     Stroke Neg Hx        Social History     Socioeconomic History    Marital status:      Spouse name: Not on file    Number of children: Not on file    Years of education: Not on file    Highest education level: Associate degree: academic program   Occupational History    Occupation: Cardiac ICU - tech   Tobacco Use    Smoking status: Never    Smokeless tobacco: Never   Vaping Use    Vaping Use: Never used   Substance and Sexual Activity    Alcohol use: Not Currently     Comment: Less than 1/month    Drug use: Never    Sexual activity: Yes     Partners: Female     Comment: has 3 kids   Other Topics Concern    Not on file   Social History Narrative    , 3 kids     and      Social Determinants of Health     Financial Resource Strain: Low Risk  (2/17/2023)    Overall Financial Resource Strain (CARDIA)     Difficulty of Paying Living  Expenses: Not hard at all   Food Insecurity: No Food Insecurity (2/17/2023)    Hunger Vital Sign     Worried About Running Out of Food in the Last Year: Never true     Ran Out of Food in the Last Year: Never true   Transportation Needs: No Transportation Needs (2/17/2023)    PRAPARE - Transportation     Lack of Transportation (Medical): No     Lack of Transportation (Non-Medical): No   Physical Activity: Insufficiently Active (2/17/2023)    Exercise Vital Sign     Days of Exercise per Week: 1 day     Minutes of Exercise per Session: 20 min   Stress: No Stress Concern Present (2/17/2023)    [employer] [place] [employer] - Occupational Stress Questionnaire     Feeling of Stress : Not at all   Social Connections: Socially Isolated (2/17/2023)    Social Connection and Isolation Panel [NHANES]     Frequency of Communication with Friends and Family: Never     Frequency of Social Gatherings with Friends and Family: Never     Attends Religion Services: Never     Active Member of Clubs or Organizations: No     Attends Club or Organization Meetings: Never     Marital Status:    Intimate Partner Violence: Not on file   Housing Stability: Low Risk  (2/17/2023)    Housing Stability Vital Sign     Unable to Pay for Housing in the Last Year: No     Number of Places Lived in the Last Year: 1     Unstable Housing in the Last Year: No       Allergies as of 08/25/2023    (No Known Allergies)          Current medications as of today   Current Outpatient Medications   Medication Sig Dispense Refill    pantoprazole (PROTONIX) 40 MG Tablet Delayed Response Take 1 Tablet by mouth every day. 90 Tablet 1    montelukast (SINGULAIR) 10 MG Tab Take 1 Tablet by mouth every day. 90 Tablet 1    fluticasone (FLONASE) 50 MCG/ACT nasal spray Administer 1-2 Sprays into affected nostril(S) every day. Each nostril. 16 g 6    cyclobenzaprine (FLEXERIL) 10 mg Tab Take 1 Tablet by mouth 3 times a day as needed for Moderate Pain or Muscle  Spasms. 30 Tablet 0    meloxicam (MOBIC) 7.5 MG Tab Take 1 Tablet by mouth 1 time a day as needed for Inflammation or Moderate Pain. 30 Tablet 0    albuterol 108 (90 Base) MCG/ACT Aero Soln inhalation aerosol INHALE TWO PUFFS BY MOUTH EVERY 4 HOURS AS NEEDED FOR SHORTNESS OF BREATH 18 g 0    acetaminophen (TYLENOL) 500 MG Tab Take 1 Tablet by mouth every four hours as needed for Mild Pain or Moderate Pain. (Patient not taking: Reported on 8/25/2023)       No current facility-administered medications for this visit.         Physical Exam:   Gen:           Alert and oriented, No apparent distress. Mood and affect appropriate, normal interaction with examiner.   Hearing:     Grossly intact.  Nose:          Normal, no lesions or deformities.  Dentition:    Good dentition.   Oropharynx:   Tongue normal, posterior pharynx without erythema or exudate.  Mallampati Classification: III  Neck:        Supple, trachea midline, no masses.  Respiratory Effort: No intercostal retractions or use of accessory muscles.   Gait and Station: Normal.  Digits and Nails: No clubbing, cyanosis, petechiae, or nodes.   Skin:        No rashes, lesions or ulcers noted.               Ext:           No cyanosis or edema.      Immunizations:  Flu: 2/23/2023  Radha SARS CoV2 Vaccine: 1/21/2022    Assessment / Plan:  1. SUSAN (obstructive sleep apnea)  - DME CPAP    Reviewed overnight sleep study, reviewed risks associated with untreated or undertreated sleep apnea.  Patient wishes to proceed with auto CPAP trial.  We will send orders for auto CPAP to [facility], contact information given to patient.  Understands he will need a short-term follow-up appointment with 30 to 60 days of usage data for review.  Reviewed strategies for success including training or acclamation for 3 to 4 days with 30 to 120 minutes of usage awake.  Transitioning to nighttime use.  Minimum 4 hours/day and 70% of days.    2. BMI 40.0-44.9, adult (HCC)    Elevated BMI: Discussion  regarding impact central adiposity on pulmonary function.  Encouraged to increase activity as tolerated and monitor nutritional intake.  Patient is actively working on weight loss.        Follow-up:   Return in about 3 months (around 11/25/2023) for Return with Shannan Garza PA-C.    Please note that this dictation was created using voice recognition software. I have made every reasonable attempt to correct obvious errors, but it is possible there are errors of grammar and possibly content that I did not discover before finalizing the note.

## 2023-10-23 DIAGNOSIS — K21.9 LARYNGOPHARYNGEAL REFLUX (LPR): ICD-10-CM

## 2023-10-24 DIAGNOSIS — K21.9 LARYNGOPHARYNGEAL REFLUX (LPR): ICD-10-CM

## 2023-10-24 RX ORDER — PANTOPRAZOLE SODIUM 40 MG/1
40 TABLET, DELAYED RELEASE ORAL DAILY
Qty: 90 TABLET | Refills: 1 | Status: SHIPPED | OUTPATIENT
Start: 2023-10-24 | End: 2023-10-24 | Stop reason: SDUPTHER

## 2023-10-24 RX ORDER — PANTOPRAZOLE SODIUM 40 MG/1
40 TABLET, DELAYED RELEASE ORAL DAILY
Qty: 90 TABLET | Refills: 1 | Status: SHIPPED | OUTPATIENT
Start: 2023-10-24 | End: 2024-03-07 | Stop reason: SDUPTHER

## 2023-12-05 ENCOUNTER — APPOINTMENT (OUTPATIENT)
Dept: SLEEP MEDICINE | Facility: MEDICAL CENTER | Age: 33
End: 2023-12-05
Payer: COMMERCIAL

## 2024-01-03 ENCOUNTER — OFFICE VISIT (OUTPATIENT)
Dept: SLEEP MEDICINE | Facility: MEDICAL CENTER | Age: 34
End: 2024-01-03
Attending: STUDENT IN AN ORGANIZED HEALTH CARE EDUCATION/TRAINING PROGRAM
Payer: COMMERCIAL

## 2024-01-03 VITALS
SYSTOLIC BLOOD PRESSURE: 122 MMHG | HEART RATE: 110 BPM | HEIGHT: 70 IN | BODY MASS INDEX: 39.94 KG/M2 | OXYGEN SATURATION: 96 % | WEIGHT: 279 LBS | DIASTOLIC BLOOD PRESSURE: 72 MMHG

## 2024-01-03 DIAGNOSIS — G47.33 OSA (OBSTRUCTIVE SLEEP APNEA): Primary | ICD-10-CM

## 2024-01-03 PROCEDURE — 3078F DIAST BP <80 MM HG: CPT | Performed by: STUDENT IN AN ORGANIZED HEALTH CARE EDUCATION/TRAINING PROGRAM

## 2024-01-03 PROCEDURE — 3074F SYST BP LT 130 MM HG: CPT | Performed by: STUDENT IN AN ORGANIZED HEALTH CARE EDUCATION/TRAINING PROGRAM

## 2024-01-03 PROCEDURE — 99212 OFFICE O/P EST SF 10 MIN: CPT | Performed by: STUDENT IN AN ORGANIZED HEALTH CARE EDUCATION/TRAINING PROGRAM

## 2024-01-03 PROCEDURE — 99213 OFFICE O/P EST LOW 20 MIN: CPT | Performed by: STUDENT IN AN ORGANIZED HEALTH CARE EDUCATION/TRAINING PROGRAM

## 2024-01-03 ASSESSMENT — FIBROSIS 4 INDEX: FIB4 SCORE: 0.4

## 2024-01-03 ASSESSMENT — PATIENT HEALTH QUESTIONNAIRE - PHQ9: CLINICAL INTERPRETATION OF PHQ2 SCORE: 0

## 2024-01-03 NOTE — PROGRESS NOTES
[facility] Follow-up Visit    CC: Follow-up regarding management of obstructive sleep apnea      HPI:  Chao Pereira Jr. is a 33 y.o.male  with chronic cough, obesity, and obstructive sleep apnea on CPAP.  Presents today for first compliance visit after receiving new CPAP machine.    He states he received his machine sometime around October.  He had difficulty using his machine.  He feels with using the machine sometimes worsens his cough on the exhale.  He states he has tried multiple masks and his current mask is comfortable.  He does not feel the pressure is too strong or not enough.  He admits that he has not put a lot of effort into using his CPAP machine.    He is working with his primary care provider along with other members of his care team to try to figure out what is causing his chronic cough.    DME provider: [facility]   Device: Airsense 10  Mask: fullface   Aerophagia: No   Snoring: No   Dry mouth: Yes  Leak: No   Skin irritation: No   Chin strap: No     Patient Active Problem List    Diagnosis Date Noted    SUSAN (obstructive sleep apnea) 05/16/2023    Elevated blood pressure reading 02/17/2023    Erythrocytosis 02/17/2023    Microcytosis 02/17/2023    Alkaline phosphatase elevation 02/17/2023    Neutrophilia 02/17/2023    Laryngopharyngeal reflux (LPR) 01/05/2023    Snoring 01/05/2023    Vitamin D deficiency 01/05/2023    Dyslipidemia     Post-nasal drip 12/01/2021    H/O seasonal allergies 03/23/2021    Class 2 severe obesity due to excess calories with serious comorbidity and body mass index (BMI) of 39.0 to 39.9 in adult (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) 09/10/2021    Allergy     Dyslipidemia     History of COVID-19 12/3/2021    Morning headache     Pneumonia due to COVID-19 virus 12/01/2021    Wears glasses         Past Surgical History:   Procedure Laterality Date     OTHER  2005    triple arthrodesis    VASECTOMY         Family History   Problem Relation Age of Onset    Alcohol abuse Mother     Hypertension Father     Drug abuse Sister     Psychiatric Illness Sister         schizophrenia    Drug abuse Sister     Cancer Neg Hx     Diabetes Neg Hx     Heart Disease Neg Hx     Stroke Neg Hx        Social History     Socioeconomic History    Marital status:      Spouse name: Not on file    Number of children: Not on file    Years of education: Not on file    Highest education level: Associate degree: academic program   Occupational History    Occupation: Cardiac ICU - tech   Tobacco Use    Smoking status: Never    Smokeless tobacco: Never   Vaping Use    Vaping Use: Never used   Substance and Sexual Activity    Alcohol use: Not Currently     Comment: Less than 1/month    Drug use: Never    Sexual activity: Yes     Partners: Female     Comment: has 3 kids   Other Topics Concern    Not on file   Social History Narrative    , 3 kids     and      Social Determinants of Health     Financial Resource Strain: Low Risk  (2/17/2023)    Overall Financial Resource Strain (CARDIA)     Difficulty of Paying Living Expenses: Not hard at all   Food Insecurity: No Food Insecurity (2/17/2023)    Hunger Vital Sign     Worried About Running Out of Food in the Last Year: Never true     Ran Out of Food in the Last Year: Never true   Transportation Needs: No Transportation Needs (2/17/2023)    PRAPARE - Transportation     Lack of Transportation (Medical): No     Lack of Transportation (Non-Medical): No   Physical Activity: Insufficiently Active (2/17/2023)    Exercise Vital Sign     Days of Exercise per Week: 1 day     Minutes of Exercise per Session: 20 min   Stress: No Stress Concern Present (2/17/2023)    [employer] - Occupational Stress Questionnaire     Feeling of Stress : Not at all   Social Connections: Socially Isolated  (2/17/2023)    Social Connection and Isolation Panel [NHANES]     Frequency of Communication with Friends and Family: Never     Frequency of Social Gatherings with Friends and Family: Never     Attends Faith Services: Never     Active Member of Clubs or Organizations: No     Attends Club or Organization Meetings: Never     Marital Status:    Intimate Partner Violence: Not on file   Housing Stability: Low Risk  (2/17/2023)    Housing Stability Vital Sign     Unable to Pay for Housing in the Last Year: No     Number of Places Lived in the Last Year: 1     Unstable Housing in the Last Year: No       Current Outpatient Medications   Medication Sig Dispense Refill    pantoprazole (PROTONIX) 40 MG Tablet Delayed Response Take 1 Tablet by mouth every day. 90 Tablet 1    montelukast (SINGULAIR) 10 MG Tab Take 1 Tablet by mouth every day. 90 Tablet 1    fluticasone (FLONASE) 50 MCG/ACT nasal spray Administer 1-2 Sprays into affected nostril(S) every day. Each nostril. 16 g 6    cyclobenzaprine (FLEXERIL) 10 mg Tab Take 1 Tablet by mouth 3 times a day as needed for Moderate Pain or Muscle Spasms. 30 Tablet 0    meloxicam (MOBIC) 7.5 MG Tab Take 1 Tablet by mouth 1 time a day as needed for Inflammation or Moderate Pain. 30 Tablet 0    albuterol 108 (90 Base) MCG/ACT Aero Soln inhalation aerosol INHALE TWO PUFFS BY MOUTH EVERY 4 HOURS AS NEEDED FOR SHORTNESS OF BREATH 18 g 0    acetaminophen (TYLENOL) 500 MG Tab Take 500 mg by mouth every four hours as needed for Mild Pain or Moderate Pain.       No current facility-administered medications for this visit.        ALLERGIES: Patient has no known allergies.    ROS  Constitutional: Denies fevers, Denies weight changes  Ears/Nose/Throat/Mouth: Denies nasal congestion or sore throat   Cardiovascular: Denies chest pain  Respiratory: Denies shortness of breath, Denies cough  Gastrointestinal/Hepatic: Denies nausea, vomiting  Sleep: see HPI      PHYSICAL EXAM  /72 (BP  "Location: Left arm, Patient Position: Sitting, BP Cuff Size: Adult)   Pulse (!) 110   Ht 1.778 m (5' 10\")   Wt (!) 127 kg (279 lb)   SpO2 96%   BMI 40.03 kg/m²   Appearance: Well-nourished, well-developed, no acute distress  Eyes:  No scleral icterus , EOMI  Musculoskeletal:  Grossly normal; gait and station normal; digits and nails normal  Skin:  No rashes, petechiae, cyanosis  Neurologic: without focal signs; oriented to person, time, place, and purpose; judgement intact      Medical Decision Making   Assessment and Plan  Chao Pereira Jr. is a 33 y.o.male  with chronic cough, obesity, and obstructive sleep apnea on CPAP.  Presents today for first compliance visit after receiving new CPAP machine.    The medical record was reviewed.    Obstructive sleep apnea  Reviewed recent compliance data showing no usage in the past 30 days.  Current settings are auto CPAP 5-15.  Discussed importance of use of CPAP given his complaints.  Patient would benefit from trying to desensitize to the machine with wearing it during the day for 30 to 40 minutes while awake.  Advised to notify us if there is any issues with pressure settings that can be changed.    PLAN:   -Encourage CPAP usage  -Advised to reach out via MyChart with questions     Has been advised to continue the current CPAP, clean equipment frequently, and get new mask and supplies as allowed by insurance and DME. Recommend an earlier appointment, if significant treatment barriers develop.    Patients with SUSAN are at increased risk of cardiovascular disease including coronary artery disease, systemic arterial hypertension, pulmonary arterial hypertension, cardiac arrythmias, and stroke. The patient was advised to avoid driving a motor vehicle when drowsy.    Positive airway pressure will favorably impact many of the adverse conditions and effects provoked by SUSAN.    Have advised the patient to follow up with the appropriate healthcare practitioners for " all other medical problems and issues.    Return in about 2 months (around 3/3/2024).      Please note portions of this record was created using voice recognition software. I have made every reasonable attempt to correct obvious errors, but I expect that there are errors of grammar and possibly content I did not discover before finalizing the note.

## 2024-01-12 ENCOUNTER — APPOINTMENT (RX ONLY)
Dept: URBAN - METROPOLITAN AREA CLINIC 31 | Facility: CLINIC | Age: 34
Setting detail: DERMATOLOGY
End: 2024-01-12

## 2024-01-12 DIAGNOSIS — L738 OTHER SPECIFIED DISEASES OF HAIR AND HAIR FOLLICLES: ICD-10-CM

## 2024-01-12 DIAGNOSIS — L85.3 XEROSIS CUTIS: ICD-10-CM

## 2024-01-12 DIAGNOSIS — L663 OTHER SPECIFIED DISEASES OF HAIR AND HAIR FOLLICLES: ICD-10-CM

## 2024-01-12 DIAGNOSIS — L73.9 FOLLICULAR DISORDER, UNSPECIFIED: ICD-10-CM

## 2024-01-12 PROBLEM — L02.222 FURUNCLE OF BACK [ANY PART, EXCEPT BUTTOCK]: Status: ACTIVE | Noted: 2024-01-12

## 2024-01-12 PROBLEM — L02.426 FURUNCLE OF LEFT LOWER LIMB: Status: ACTIVE | Noted: 2024-01-12

## 2024-01-12 PROBLEM — L02.223 FURUNCLE OF CHEST WALL: Status: ACTIVE | Noted: 2024-01-12

## 2024-01-12 PROCEDURE — 99213 OFFICE O/P EST LOW 20 MIN: CPT

## 2024-01-12 PROCEDURE — ? COUNSELING

## 2024-01-12 PROCEDURE — ? ADDITIONAL NOTES

## 2024-01-12 PROCEDURE — ? MEDICATION COUNSELING

## 2024-01-12 PROCEDURE — ? PRESCRIPTION

## 2024-01-12 RX ORDER — DOXYCYCLINE HYCLATE 100 MG/1
CAPSULE, GELATIN COATED ORAL
Qty: 42 | Refills: 1 | Status: ERX | COMMUNITY
Start: 2024-01-12

## 2024-01-12 RX ADMIN — DOXYCYCLINE HYCLATE: 100 CAPSULE, GELATIN COATED ORAL at 00:00

## 2024-01-12 ASSESSMENT — LOCATION DETAILED DESCRIPTION DERM
LOCATION DETAILED: LEFT MEDIAL SUPERIOR CHEST
LOCATION DETAILED: LEFT THENAR EMINENCE
LOCATION DETAILED: RIGHT THENAR EMINENCE
LOCATION DETAILED: RIGHT SUPERIOR MEDIAL UPPER BACK
LOCATION DETAILED: LEFT ANTERIOR PROXIMAL THIGH

## 2024-01-12 ASSESSMENT — LOCATION SIMPLE DESCRIPTION DERM
LOCATION SIMPLE: RIGHT UPPER BACK
LOCATION SIMPLE: RIGHT HAND
LOCATION SIMPLE: CHEST
LOCATION SIMPLE: LEFT HAND
LOCATION SIMPLE: LEFT THIGH

## 2024-01-12 ASSESSMENT — LOCATION ZONE DERM
LOCATION ZONE: TRUNK
LOCATION ZONE: HAND
LOCATION ZONE: LEG

## 2024-01-12 NOTE — PROCEDURE: ADDITIONAL NOTES
Detail Level: Detailed
Additional Notes: Will treat impetigo with doxy first, then start topical steroids in 3 weeks.
Render Risk Assessment In Note?: no

## 2024-01-12 NOTE — PROCEDURE: MEDICATION COUNSELING
Arava Pregnancy And Lactation Text: This medication is Pregnancy Category X and is absolutely contraindicated during pregnancy. It is unknown if it is excreted in breast milk.
Finasteride Female Counseling: Finasteride Counseling:  I discussed with the patient the risks of use of finasteride including but not limited to decreased libido and sexual dysfunction. Explained the teratogenic nature of the medication and stressed the importance of not getting pregnant during treatment. All of the patient's questions and concerns were addressed.
Quinolones Pregnancy And Lactation Text: This medication is Pregnancy Category C and it isn't know if it is safe during pregnancy. It is also excreted in breast milk.
Picato Pregnancy And Lactation Text: This medication is Pregnancy Category C. It is unknown if this medication is excreted in breast milk.
Bexarotene Pregnancy And Lactation Text: This medication is Pregnancy Category X and should not be given to women who are pregnant or may become pregnant. This medication should not be used if you are breast feeding.
Glycopyrrolate Counseling:  I discussed with the patient the risks of glycopyrrolate including but not limited to skin rash, drowsiness, dry mouth, difficulty urinating, and blurred vision.
Ivermectin Counseling:  Patient instructed to take medication on an empty stomach with a full glass of water.  Patient informed of potential adverse effects including but not limited to nausea, diarrhea, dizziness, itching, and swelling of the extremities or lymph nodes.  The patient verbalized understanding of the proper use and possible adverse effects of ivermectin.  All of the patient's questions and concerns were addressed.
Doxycycline Counseling:  Patient counseled regarding possible photosensitivity and increased risk for sunburn.  Patient instructed to avoid sunlight, if possible.  When exposed to sunlight, patients should wear protective clothing, sunglasses, and sunscreen.  The patient was instructed to call the office immediately if the following severe adverse effects occur:  hearing changes, easy bruising/bleeding, severe headache, or vision changes.  The patient verbalized understanding of the proper use and possible adverse effects of doxycycline.  All of the patient's questions and concerns were addressed.
Klisyri Counseling:  I discussed with the patient the risks of Klisyri including but not limited to erythema, scaling, itching, weeping, crusting, and pain.
Nsaids Pregnancy And Lactation Text: These medications are considered safe up to 30 weeks gestation. It is excreted in breast milk.
Benzoyl Peroxide Counseling: Patient counseled that medicine may cause skin irritation and bleach clothing.  In the event of skin irritation, the patient was advised to reduce the amount of the drug applied or use it less frequently.   The patient verbalized understanding of the proper use and possible adverse effects of benzoyl peroxide.  All of the patient's questions and concerns were addressed.
Griseofulvin Counseling:  I discussed with the patient the risks of griseofulvin including but not limited to photosensitivity, cytopenia, liver damage, nausea/vomiting and severe allergy.  The patient understands that this medication is best absorbed when taken with a fatty meal (e.g., ice cream or french fries).
Drysol Pregnancy And Lactation Text: This medication is considered safe during pregnancy and breast feeding.
Cosentyx Pregnancy And Lactation Text: This medication is Pregnancy Category B and is considered safe during pregnancy. It is unknown if this medication is excreted in breast milk.
Propranolol Counseling:  I discussed with the patient the risks of propranolol including but not limited to low heart rate, low blood pressure, low blood sugar, restlessness and increased cold sensitivity. They should call the office if they experience any of these side effects.
Sotyktu Counseling:  I discussed the most common side effects of Sotyktu including: common cold, sore throat, sinus infections, cold sores, canker sores, folliculitis, and acne.  I also discussed more serious side effects of Sotyktu including but not limited to: serious allergic reactions; increased risk for infections such as TB; cancers such as lymphomas; rhabdomyolysis and elevated CPK; and elevated triglycerides and liver enzymes. 
Stelara Counseling:  I discussed with the patient the risks of ustekinumab including but not limited to immunosuppression, malignancy, posterior leukoencephalopathy syndrome, and serious infections.  The patient understands that monitoring is required including a PPD at baseline and must alert us or the primary physician if symptoms of infection or other concerning signs are noted.
Topical Metronidazole Counseling: Metronidazole is a topical antibiotic medication. You may experience burning, stinging, redness, or allergic reactions.  Please call our office if you develop any problems from using this medication.
Cellcept Counseling:  I discussed with the patient the risks of mycophenolate mofetil including but not limited to infection/immunosuppression, GI upset, hypokalemia, hypercholesterolemia, bone marrow suppression, lymphoproliferative disorders, malignancy, GI ulceration/bleed/perforation, colitis, interstitial lung disease, kidney failure, progressive multifocal leukoencephalopathy, and birth defects.  The patient understands that monitoring is required including a baseline creatinine and regular CBC testing. In addition, patient must alert us immediately if symptoms of infection or other concerning signs are noted.
Cimetidine Pregnancy And Lactation Text: This medication is Pregnancy Category B and is considered safe during pregnancy. It is also excreted in breast milk and breast feeding isn't recommended.
Valtrex Pregnancy And Lactation Text: this medication is Pregnancy Category B and is considered safe during pregnancy. This medication is not directly found in breast milk but it's metabolite acyclovir is present.
VTAMA Counseling: I discussed with the patient that VTAMA is not for use in the eyes, mouth or mouth. They should call the office if they develop any signs of allergic reactions to VTAMA. The patient verbalized understanding of the proper use and possible adverse effects of VTAMA.  All of the patient's questions and concerns were addressed.
Prednisone Pregnancy And Lactation Text: This medication is Pregnancy Category C and it isn't know if it is safe during pregnancy. This medication is excreted in breast milk.
Soolantra Pregnancy And Lactation Text: This medication is Pregnancy Category C. This medication is considered safe during breast feeding.
Arava Counseling:  Patient counseled regarding adverse effects of Arava including but not limited to nausea, vomiting, abnormalities in liver function tests. Patients may develop mouth sores, rash, diarrhea, and abnormalities in blood counts. The patient understands that monitoring is required including LFTs and blood counts.  There is a rare possibility of scarring of the liver and lung problems that can occur when taking methotrexate. Persistent nausea, loss of appetite, pale stools, dark urine, cough, and shortness of breath should be reported immediately. Patient advised to discontinue Arava treatment and consult with a physician prior to attempting conception. The patient will have to undergo a treatment to eliminate Arava from the body prior to conception.
Protopic Counseling: Patient may experience a mild burning sensation during topical application. Protopic is not approved in children less than 2 years of age. There have been case reports of hematologic and skin malignancies in patients using topical calcineurin inhibitors although causality is questionable.
Finasteride Male Counseling: Finasteride Counseling:  I discussed with the patient the risks of use of finasteride including but not limited to decreased libido, decreased ejaculate volume, gynecomastia, and depression. Women should not handle medication.  All of the patient's questions and concerns were addressed.
Rifampin Counseling: I discussed with the patient the risks of rifampin including but not limited to liver damage, kidney damage, red-orange body fluids, nausea/vomiting and severe allergy.
Isotretinoin Counseling: Patient should get monthly blood tests, not donate blood, not drive at night if vision affected, not share medication, and not undergo elective surgery for 6 months after tx completed. Side effects reviewed, pt to contact office should one occur.
Ivermectin Pregnancy And Lactation Text: This medication is Pregnancy Category C and it isn't known if it is safe during pregnancy. It is also excreted in breast milk.
Gabapentin Pregnancy And Lactation Text: This medication is Pregnancy Category C and isn't considered safe during pregnancy. It is excreted in breast milk.
Klisyri Pregnancy And Lactation Text: It is unknown if this medication can harm a developing fetus or if it is excreted in breast milk.
Doxycycline Pregnancy And Lactation Text: This medication is Pregnancy Category D and not consider safe during pregnancy. It is also excreted in breast milk but is considered safe for shorter treatment courses.
Erivedge Counseling- I discussed with the patient the risks of Erivedge including but not limited to nausea, vomiting, diarrhea, constipation, weight loss, changes in the sense of taste, decreased appetite, muscle spasms, and hair loss.  The patient verbalized understanding of the proper use and possible adverse effects of Erivedge.  All of the patient's questions and concerns were addressed.
Nsaids Counseling: NSAID Counseling: I discussed with the patient that NSAIDs should be taken with food. Prolonged use of NSAIDs can result in the development of stomach ulcers.  Patient advised to stop taking NSAIDs if abdominal pain occurs.  The patient verbalized understanding of the proper use and possible adverse effects of NSAIDs.  All of the patient's questions and concerns were addressed.
Griseofulvin Pregnancy And Lactation Text: This medication is Pregnancy Category X and is known to cause serious birth defects. It is unknown if this medication is excreted in breast milk but breast feeding should be avoided.
Elidel Counseling: Patient may experience a mild burning sensation during topical application. Elidel is not approved in children less than 2 years of age. There have been case reports of hematologic and skin malignancies in patients using topical calcineurin inhibitors although causality is questionable.
Azithromycin Counseling:  I discussed with the patient the risks of azithromycin including but not limited to GI upset, allergic reaction, drug rash, diarrhea, and yeast infections.
Dupixent Counseling: I discussed with the patient the risks of dupilumab including but not limited to eye infection and irritation, cold sores, injection site reactions, worsening of asthma, allergic reactions and increased risk of parasitic infection.  Live vaccines should be avoided while taking dupilumab. Dupilumab will also interact with certain medications such as warfarin and cyclosporine. The patient understands that monitoring is required and they must alert us or the primary physician if symptoms of infection or other concerning signs are noted.
Infliximab Counseling:  I discussed with the patient the risks of infliximab including but not limited to myelosuppression, immunosuppression, autoimmune hepatitis, demyelinating diseases, lymphoma, and serious infections.  The patient understands that monitoring is required including a PPD at baseline and must alert us or the primary physician if symptoms of infection or other concerning signs are noted.
Oxybutynin Pregnancy And Lactation Text: This medication is Pregnancy Category B and is considered safe during pregnancy. It is unknown if it is excreted in breast milk.
Rinvoq Pregnancy And Lactation Text: Based on animal studies, Rinvoq may cause embryo-fetal harm when administered to pregnant women.  The medication should not be used in pregnancy.  Breastfeeding is not recommended during treatment and for 6 days after the last dose.
Benzoyl Peroxide Pregnancy And Lactation Text: This medication is Pregnancy Category C. It is unknown if benzoyl peroxide is excreted in breast milk.
Skyrizi Pregnancy And Lactation Text: The risk during pregnancy and breastfeeding is uncertain with this medication.
Doxepin Counseling:  Patient advised that the medication is sedating and not to drive a car after taking this medication. Patient informed of potential adverse effects including but not limited to dry mouth, urinary retention, and blurry vision.  The patient verbalized understanding of the proper use and possible adverse effects of doxepin.  All of the patient's questions and concerns were addressed.
Topical Metronidazole Pregnancy And Lactation Text: This medication is Pregnancy Category B and considered safe during pregnancy.  It is also considered safe to use while breastfeeding.
Azathioprine Pregnancy And Lactation Text: This medication is Pregnancy Category D and isn't considered safe during pregnancy. It is unknown if this medication is excreted in breast milk.
Valtrex Counseling: I discussed with the patient the risks of valacyclovir including but not limited to kidney damage, nausea, vomiting and severe allergy.  The patient understands that if the infection seems to be worsening or is not improving, they are to call.
Winlevi Pregnancy And Lactation Text: This medication is considered safe during pregnancy and breastfeeding.
Topical Retinoid counseling:  Patient advised to apply a pea-sized amount only at bedtime and wait 30 minutes after washing their face before applying.  If too drying, patient may add a non-comedogenic moisturizer. The patient verbalized understanding of the proper use and possible adverse effects of retinoids.  All of the patient's questions and concerns were addressed.
Prednisone Counseling:  I discussed with the patient the risks of prolonged use of prednisone including but not limited to weight gain, insomnia, osteoporosis, mood changes, diabetes, susceptibility to infection, glaucoma and high blood pressure.  In cases where prednisone use is prolonged, patients should be monitored with blood pressure checks, serum glucose levels and an eye exam.  Additionally, the patient may need to be placed on GI prophylaxis, PCP prophylaxis, and calcium and vitamin D supplementation and/or a bisphosphonate.  The patient verbalized understanding of the proper use and the possible adverse effects of prednisone.  All of the patient's questions and concerns were addressed.
Dutasteride Pregnancy And Lactation Text: This medication is absolutely contraindicated in women, especially during pregnancy and breast feeding. Feminization of male fetuses is possible if taking while pregnant.
Rifampin Pregnancy And Lactation Text: This medication is Pregnancy Category C and it isn't know if it is safe during pregnancy. It is also excreted in breast milk and should not be used if you are breast feeding.
Protopic Pregnancy And Lactation Text: This medication is Pregnancy Category C. It is unknown if this medication is excreted in breast milk when applied topically.
Gabapentin Counseling: I discussed with the patient the risks of gabapentin including but not limited to dizziness, somnolence, fatigue and ataxia.
Erythromycin Counseling:  I discussed with the patient the risks of erythromycin including but not limited to GI upset, allergic reaction, drug rash, diarrhea, increase in liver enzymes, and yeast infections.
Minoxidil Counseling: Minoxidil is a topical medication which can increase blood flow where it is applied. It is uncertain how this medication increases hair growth. Side effects are uncommon and include stinging and allergic reactions.
Isotretinoin Pregnancy And Lactation Text: This medication is Pregnancy Category X and is considered extremely dangerous during pregnancy. It is unknown if it is excreted in breast milk.
Itraconazole Counseling:  I discussed with the patient the risks of itraconazole including but not limited to liver damage, nausea/vomiting, neuropathy, and severe allergy.  The patient understands that this medication is best absorbed when taken with acidic beverages such as non-diet cola or ginger ale.  The patient understands that monitoring is required including baseline LFTs and repeat LFTs at intervals.  The patient understands that they are to contact us or the primary physician if concerning signs are noted.
Opioid Pregnancy And Lactation Text: These medications can lead to premature delivery and should be avoided during pregnancy. These medications are also present in breast milk in small amounts.
Azithromycin Pregnancy And Lactation Text: This medication is considered safe during pregnancy and is also secreted in breast milk.
Rinvoq Counseling: I discussed with the patient the risks of Rinvoq therapy including but not limited to upper respiratory tract infections, shingles, cold sores, bronchitis, nausea, cough, fever, acne, and headache. Live vaccines should be avoided.  This medication has been linked to serious infections; higher rate of mortality; malignancy and lymphoproliferative disorders; major adverse cardiovascular events; thrombosis; thrombocytopenia, anemia, and neutropenia; lipid elevations; liver enzyme elevations; and gastrointestinal perforations.
Niacinamide Pregnancy And Lactation Text: These medications are considered safe during pregnancy.
Carac Counseling:  I discussed with the patient the risks of Carac including but not limited to erythema, scaling, itching, weeping, crusting, and pain.
Adbry Counseling: I discussed with the patient the risks of tralokinumab including but not limited to eye infection and irritation, cold sores, injection site reactions, worsening of asthma, allergic reactions and increased risk of parasitic infection.  Live vaccines should be avoided while taking tralokinumab. The patient understands that monitoring is required and they must alert us or the primary physician if symptoms of infection or other concerning signs are noted.
Skyrizi Counseling: I discussed with the patient the risks of risankizumab-rzaa including but not limited to immunosuppression, and serious infections.  The patient understands that monitoring is required including a PPD at baseline and must alert us or the primary physician if symptoms of infection or other concerning signs are noted.
Azathioprine Counseling:  I discussed with the patient the risks of azathioprine including but not limited to myelosuppression, immunosuppression, hepatotoxicity, lymphoma, and infections.  The patient understands that monitoring is required including baseline LFTs, Creatinine, possible TPMP genotyping and weekly CBCs for the first month and then every 2 weeks thereafter.  The patient verbalized understanding of the proper use and possible adverse effects of azathioprine.  All of the patient's questions and concerns were addressed.
Doxepin Pregnancy And Lactation Text: This medication is Pregnancy Category C and it isn't known if it is safe during pregnancy. It is also excreted in breast milk and breast feeding isn't recommended.
Topical Steroids Counseling: I discussed with the patient that prolonged use of topical steroids can result in the increased appearance of superficial blood vessels (telangiectasias), lightening (hypopigmentation) and thinning of the skin (atrophy).  Patient understands to avoid using high potency steroids in skin folds, the groin or the face.  The patient verbalized understanding of the proper use and possible adverse effects of topical steroids.  All of the patient's questions and concerns were addressed.
Dupixent Pregnancy And Lactation Text: This medication likely crosses the placenta but the risk for the fetus is uncertain. This medication is excreted in breast milk.
Oxybutynin Counseling:  I discussed with the patient the risks of oxybutynin including but not limited to skin rash, drowsiness, dry mouth, difficulty urinating, and blurred vision.
Xolair Pregnancy And Lactation Text: This medication is Pregnancy Category B and is considered safe during pregnancy. This medication is excreted in breast milk.
Methotrexate Pregnancy And Lactation Text: This medication is Pregnancy Category X and is known to cause fetal harm. This medication is excreted in breast milk.
Tranexamic Acid Pregnancy And Lactation Text: It is unknown if this medication is safe during pregnancy or breast feeding.
Winlevi Counseling:  I discussed with the patient the risks of topical clascoterone including but not limited to erythema, scaling, itching, and stinging. Patient voiced their understanding.
Dutasteride Female Counseling: Dutasteride Counseling:  I discussed with the patient the risks of use of dutasteride including but not limited to decreased libido and sexual dysfunction. Explained the teratogenic nature of the medication and stressed the importance of not getting pregnant during treatment. All of the patient's questions and concerns were addressed.
Sarecycline Counseling: Patient advised regarding possible photosensitivity and discoloration of the teeth, skin, lips, tongue and gums.  Patient instructed to avoid sunlight, if possible.  When exposed to sunlight, patients should wear protective clothing, sunglasses, and sunscreen.  The patient was instructed to call the office immediately if the following severe adverse effects occur:  hearing changes, easy bruising/bleeding, severe headache, or vision changes.  The patient verbalized understanding of the proper use and possible adverse effects of sarecycline.  All of the patient's questions and concerns were addressed.
Qbrexza Counseling:  I discussed with the patient the risks of Qbrexza including but not limited to headache, mydriasis, blurred vision, dry eyes, nasal dryness, dry mouth, dry throat, dry skin, urinary hesitation, and constipation.  Local skin reactions including erythema, burning, stinging, and itching can also occur.
Minoxidil Pregnancy And Lactation Text: This medication has not been assigned a Pregnancy Risk Category but animal studies failed to show danger with the topical medication. It is unknown if the medication is excreted in breast milk.
Erythromycin Pregnancy And Lactation Text: This medication is Pregnancy Category B and is considered safe during pregnancy. It is also excreted in breast milk.
Bexarotene Counseling:  I discussed with the patient the risks of bexarotene including but not limited to hair loss, dry lips/skin/eyes, liver abnormalities, hyperlipidemia, pancreatitis, depression/suicidal ideation, photosensitivity, drug rash/allergic reactions, hypothyroidism, anemia, leukopenia, infection, cataracts, and teratogenicity.  Patient understands that they will need regular blood tests to check lipid profile, liver function tests, white blood cell count, thyroid function tests and pregnancy test if applicable.
High Dose Vitamin A Counseling: Side effects reviewed, pt to contact office should one occur.
Dapsone Pregnancy And Lactation Text: This medication is Pregnancy Category C and is not considered safe during pregnancy or breast feeding.
Eucrisa Counseling: Patient may experience a mild burning sensation during topical application. Eucrisa is not approved in children less than 2 years of age.
Bactrim Counseling:  I discussed with the patient the risks of sulfa antibiotics including but not limited to GI upset, allergic reaction, drug rash, diarrhea, dizziness, photosensitivity, and yeast infections.  Rarely, more serious reactions can occur including but not limited to aplastic anemia, agranulocytosis, methemoglobinemia, blood dyscrasias, liver or kidney failure, lung infiltrates or desquamative/blistering drug rashes.
Niacinamide Counseling: I recommended taking niacin or niacinamide, also know as vitamin B3, twice daily. Recent evidence suggests that taking vitamin B3 (500 mg twice daily) can reduce the risk of actinic keratoses and non-melanoma skin cancers. Side effects of vitamin B3 include flushing and headache.
Olumiant Pregnancy And Lactation Text: Based on animal studies, Olumiant may cause embryo-fetal harm when administered to pregnant women.  The medication should not be used in pregnancy.  Breastfeeding is not recommended during treatment.
Carac Pregnancy And Lactation Text: This medication is Pregnancy Category X and contraindicated in pregnancy and in women who may become pregnant. It is unknown if this medication is excreted in breast milk.
Topical Steroids Applications Pregnancy And Lactation Text: Most topical steroids are considered safe to use during pregnancy and lactation.  Any topical steroid applied to the breast or nipple should be washed off before breastfeeding.
Opioid Counseling: I discussed with the patient the potential side effects of opioids including but not limited to addiction, altered mental status, and depression. I stressed avoiding alcohol, benzodiazepines, muscle relaxants and sleep aids unless specifically okayed by a physician. The patient verbalized understanding of the proper use and possible adverse effects of opioids. All of the patient's questions and concerns were addressed. They were instructed to flush the remaining pills down the toilet if they did not need them for pain.
Otezla Pregnancy And Lactation Text: This medication is Pregnancy Category C and it isn't known if it is safe during pregnancy. It is unknown if it is excreted in breast milk.
Enbrel Counseling:  I discussed with the patient the risks of etanercept including but not limited to myelosuppression, immunosuppression, autoimmune hepatitis, demyelinating diseases, lymphoma, and infections.  The patient understands that monitoring is required including a PPD at baseline and must alert us or the primary physician if symptoms of infection or other concerning signs are noted.
Adbry Pregnancy And Lactation Text: It is unknown if this medication will adversely affect pregnancy or breast feeding.
Methotrexate Counseling:  Patient counseled regarding adverse effects of methotrexate including but not limited to nausea, vomiting, abnormalities in liver function tests. Patients may develop mouth sores, rash, diarrhea, and abnormalities in blood counts. The patient understands that monitoring is required including LFT's and blood counts.  There is a rare possibility of scarring of the liver and lung problems that can occur when taking methotrexate. Persistent nausea, loss of appetite, pale stools, dark urine, cough, and shortness of breath should be reported immediately. Patient advised to discontinue methotrexate treatment at least three months before attempting to become pregnant.  I discussed the need for folate supplements while taking methotrexate.  These supplements can decrease side effects during methotrexate treatment. The patient verbalized understanding of the proper use and possible adverse effects of methotrexate.  All of the patient's questions and concerns were addressed.
Tazorac Counseling:  Patient advised that medication is irritating and drying.  Patient may need to apply sparingly and wash off after an hour before eventually leaving it on overnight.  The patient verbalized understanding of the proper use and possible adverse effects of tazorac.  All of the patient's questions and concerns were addressed.
Tranexamic Acid Counseling:  Patient advised of the small risk of bleeding problems with tranexamic acid. They were also instructed to call if they developed any nausea, vomiting or diarrhea. All of the patient's questions and concerns were addressed.
Xolair Counseling:  Patient informed of potential adverse effects including but not limited to fever, muscle aches, rash and allergic reactions.  The patient verbalized understanding of the proper use and possible adverse effects of Xolair.  All of the patient's questions and concerns were addressed.
Spironolactone Pregnancy And Lactation Text: This medication can cause feminization of the male fetus and should be avoided during pregnancy. The active metabolite is also found in breast milk.
Dutasteride Male Counseling: Dustasteride Counseling:  I discussed with the patient the risks of use of dutasteride including but not limited to decreased libido, decreased ejaculate volume, and gynecomastia. Women who can become pregnant should not handle medication.  All of the patient's questions and concerns were addressed.
Mirvaso Counseling: Mirvaso is a topical medication which can decrease superficial blood flow where applied. Side effects are uncommon and include stinging, redness and allergic reactions.
Qbrexza Pregnancy And Lactation Text: There is no available data on Qbrexza use in pregnant women.  There is no available data on Qbrexza use in lactation.
Acitretin Pregnancy And Lactation Text: This medication is Pregnancy Category X and should not be given to women who are pregnant or may become pregnant in the future. This medication is excreted in breast milk.
High Dose Vitamin A Pregnancy And Lactation Text: High dose vitamin A therapy is contraindicated during pregnancy and breast feeding.
Sarecycline Pregnancy And Lactation Text: This medication is Pregnancy Category D and not consider safe during pregnancy. It is also excreted in breast milk.
Dapsone Counseling: I discussed with the patient the risks of dapsone including but not limited to hemolytic anemia, agranulocytosis, rashes, methemoglobinemia, kidney failure, peripheral neuropathy, headaches, GI upset, and liver toxicity.  Patients who start dapsone require monitoring including baseline LFTs and weekly CBCs for the first month, then every month thereafter.  The patient verbalized understanding of the proper use and possible adverse effects of dapsone.  All of the patient's questions and concerns were addressed.
Metronidazole Counseling:  I discussed with the patient the risks of metronidazole including but not limited to seizures, nausea/vomiting, a metallic taste in the mouth, nausea/vomiting and severe allergy.
Low Dose Naltrexone Pregnancy And Lactation Text: Naltrexone is pregnancy category C.  There have been no adequate and well-controlled studies in pregnant women.  It should be used in pregnancy only if the potential benefit justifies the potential risk to the fetus.   Limited data indicates that naltrexone is minimally excreted into breastmilk.
Olumiant Counseling: I discussed with the patient the risks of Olumiant therapy including but not limited to upper respiratory tract infections, shingles, cold sores, and nausea. Live vaccines should be avoided.  This medication has been linked to serious infections; higher rate of mortality; malignancy and lymphoproliferative disorders; major adverse cardiovascular events; thrombosis; gastrointestinal perforations; neutropenia; lymphopenia; anemia; liver enzyme elevations; and lipid elevations.
Calcipotriene Counseling:  I discussed with the patient the risks of calcipotriene including but not limited to erythema, scaling, itching, and irritation.
Ketoconazole Counseling:   Patient counseled regarding improving absorption with orange juice.  Adverse effects include but are not limited to breast enlargement, headache, diarrhea, nausea, upset stomach, liver function test abnormalities, taste disturbance, and stomach pain.  There is a rare possibility of liver failure that can occur when taking ketoconazole. The patient understands that monitoring of LFTs may be required, especially at baseline. The patient verbalized understanding of the proper use and possible adverse effects of ketoconazole.  All of the patient's questions and concerns were addressed.
Include Pregnancy/Lactation Warning?: No
Bactrim Pregnancy And Lactation Text: This medication is Pregnancy Category D and is known to cause fetal risk.  It is also excreted in breast milk.
Otezla Counseling: The side effects of Otezla were discussed with the patient, including but not limited to worsening or new depression, weight loss, diarrhea, nausea, upper respiratory tract infection, and headache. Patient instructed to call the office should any adverse effect occur.  The patient verbalized understanding of the proper use and possible adverse effects of Otezla.  All the patient's questions and concerns were addressed.
Simponi Counseling:  I discussed with the patient the risks of golimumab including but not limited to myelosuppression, immunosuppression, autoimmune hepatitis, demyelinating diseases, lymphoma, and serious infections.  The patient understands that monitoring is required including a PPD at baseline and must alert us or the primary physician if symptoms of infection or other concerning signs are noted.
Bimzelx Counseling:  I discussed with the patient the risks of Bimzelx including but not limited to depression, immunosuppression, allergic reactions and infections.  The patient understands that monitoring is required including a PPD at baseline and must alert us or the primary physician if symptoms of infection or other concerning signs are noted.
Topical Sulfur Applications Counseling: Topical Sulfur Counseling: Patient counseled that this medication may cause skin irritation or allergic reactions.  In the event of skin irritation, the patient was advised to reduce the amount of the drug applied or use it less frequently.   The patient verbalized understanding of the proper use and possible adverse effects of topical sulfur application.  All of the patient's questions and concerns were addressed.
Tazorac Pregnancy And Lactation Text: This medication is not safe during pregnancy. It is unknown if this medication is excreted in breast milk.
Hydroxyzine Counseling: Patient advised that the medication is sedating and not to drive a car after taking this medication.  Patient informed of potential adverse effects including but not limited to dry mouth, urinary retention, and blurry vision.  The patient verbalized understanding of the proper use and possible adverse effects of hydroxyzine.  All of the patient's questions and concerns were addressed.
Calcipotriene Pregnancy And Lactation Text: The use of this medication during pregnancy or lactation is not recommended as there is insufficient data.
Tetracycline Counseling: Patient counseled regarding possible photosensitivity and increased risk for sunburn.  Patient instructed to avoid sunlight, if possible.  When exposed to sunlight, patients should wear protective clothing, sunglasses, and sunscreen.  The patient was instructed to call the office immediately if the following severe adverse effects occur:  hearing changes, easy bruising/bleeding, severe headache, or vision changes.  The patient verbalized understanding of the proper use and possible adverse effects of tetracycline.  All of the patient's questions and concerns were addressed. Patient understands to avoid pregnancy while on therapy due to potential birth defects.
Rhofade Counseling: Rhofade is a topical medication which can decrease superficial blood flow where applied. Side effects are uncommon and include stinging, redness and allergic reactions.
Spironolactone Counseling: Patient advised regarding risks of diarrhea, abdominal pain, hyperkalemia, birth defects (for female patients), liver toxicity and renal toxicity. The patient may need blood work to monitor liver and kidney function and potassium levels while on therapy. The patient verbalized understanding of the proper use and possible adverse effects of spironolactone.  All of the patient's questions and concerns were addressed.
Metronidazole Pregnancy And Lactation Text: This medication is Pregnancy Category B and considered safe during pregnancy.  It is also excreted in breast milk.
Mirvaso Pregnancy And Lactation Text: This medication has not been assigned a Pregnancy Risk Category. It is unknown if the medication is excreted in breast milk.
Acitretin Counseling:  I discussed with the patient the risks of acitretin including but not limited to hair loss, dry lips/skin/eyes, liver damage, hyperlipidemia, depression/suicidal ideation, photosensitivity.  Serious rare side effects can include but are not limited to pancreatitis, pseudotumor cerebri, bony changes, clot formation/stroke/heart attack.  Patient understands that alcohol is contraindicated since it can result in liver toxicity and significantly prolong the elimination of the drug by many years.
Low Dose Naltrexone Counseling- I discussed with the patient the potential risks and side effects of low dose naltrexone including but not limited to: more vivid dreams, headaches, nausea, vomiting, abdominal pain, fatigue, dizziness, and anxiety.
Ketoconazole Pregnancy And Lactation Text: This medication is Pregnancy Category C and it isn't know if it is safe during pregnancy. It is also excreted in breast milk and breast feeding isn't recommended.
Cephalexin Counseling: I counseled the patient regarding use of cephalexin as an antibiotic for prophylactic and/or therapeutic purposes. Cephalexin (commonly prescribed under brand name Keflex) is a cephalosporin antibiotic which is active against numerous classes of bacteria, including most skin bacteria. Side effects may include nausea, diarrhea, gastrointestinal upset, rash, hives, yeast infections, and in rare cases, hepatitis, kidney disease, seizures, fever, confusion, neurologic symptoms, and others. Patients with severe allergies to penicillin medications are cautioned that there is about a 10% incidence of cross-reactivity with cephalosporins. When possible, patients with penicillin allergies should use alternatives to cephalosporins for antibiotic therapy.
Hydroquinone Counseling:  Patient advised that medication may result in skin irritation, lightening (hypopigmentation), dryness, and burning.  In the event of skin irritation, the patient was advised to reduce the amount of the drug applied or use it less frequently.  Rarely, spots that are treated with hydroquinone can become darker (pseudoochronosis).  Should this occur, patient instructed to stop medication and call the office. The patient verbalized understanding of the proper use and possible adverse effects of hydroquinone.  All of the patient's questions and concerns were addressed.
Oral Minoxidil Pregnancy And Lactation Text: This medication should only be used when clearly needed if you are pregnant, attempting to become pregnant or breast feeding.
Litfulo Pregnancy And Lactation Text: Based on animal studies, Lifulo may cause embryo-fetal harm when administered to pregnant women.  The medication should not be used in pregnancy.  Breastfeeding is not recommended during treatment.
Cantharidin Counseling:  I discussed with the patient the risks of Cantharidin including but not limited to pain, redness, burning, itching, and blistering.
Detail Level: Simple
Topical Sulfur Applications Pregnancy And Lactation Text: This medication is Pregnancy Category C and has an unknown safety profile during pregnancy. It is unknown if this topical medication is excreted in breast milk.
Thalidomide Counseling: I discussed with the patient the risks of thalidomide including but not limited to birth defects, anxiety, weakness, chest pain, dizziness, cough and severe allergy.
Bimzelx Pregnancy And Lactation Text: This medication crosses the placenta and the safety is uncertain during pregnancy. It is unknown if this medication is present in breast milk.
Aklief counseling:  Patient advised to apply a pea-sized amount only at bedtime and wait 30 minutes after washing their face before applying.  If too drying, patient may add a non-comedogenic moisturizer.  The most commonly reported side effects including irritation, redness, scaling, dryness, stinging, burning, itching, and increased risk of sunburn.  The patient verbalized understanding of the proper use and possible adverse effects of retinoids.  All of the patient's questions and concerns were addressed.
Hydroxyzine Pregnancy And Lactation Text: This medication is not safe during pregnancy and should not be taken. It is also excreted in breast milk and breast feeding isn't recommended.
Topical Clindamycin Counseling: Patient counseled that this medication may cause skin irritation or allergic reactions.  In the event of skin irritation, the patient was advised to reduce the amount of the drug applied or use it less frequently.   The patient verbalized understanding of the proper use and possible adverse effects of clindamycin.  All of the patient's questions and concerns were addressed.
Tremfya Counseling: I discussed with the patient the risks of guselkumab including but not limited to immunosuppression, serious infections, worsening of inflammatory bowel disease and drug reactions.  The patient understands that monitoring is required including a PPD at baseline and must alert us or the primary physician if symptoms of infection or other concerning signs are noted.
Cyclosporine Counseling:  I discussed with the patient the risks of cyclosporine including but not limited to hypertension, gingival hyperplasia,myelosuppression, immunosuppression, liver damage, kidney damage, neurotoxicity, lymphoma, and serious infections. The patient understands that monitoring is required including baseline blood pressure, CBC, CMP, lipid panel and uric acid, and then 1-2 times monthly CMP and blood pressure.
Humira Counseling:  I discussed with the patient the risks of adalimumab including but not limited to myelosuppression, immunosuppression, autoimmune hepatitis, demyelinating diseases, lymphoma, and serious infections.  The patient understands that monitoring is required including a PPD at baseline and must alert us or the primary physician if symptoms of infection or other concerning signs are noted.
Zyclara Counseling:  I discussed with the patient the risks of imiquimod including but not limited to erythema, scaling, itching, weeping, crusting, and pain.  Patient understands that the inflammatory response to imiquimod is variable from person to person and was educated regarded proper titration schedule.  If flu-like symptoms develop, patient knows to discontinue the medication and contact us.
Cantharidin Pregnancy And Lactation Text: This medication has not been proven safe during pregnancy. It is unknown if this medication is excreted in breast milk.
Colchicine Counseling:  Patient counseled regarding adverse effects including but not limited to stomach upset (nausea, vomiting, stomach pain, or diarrhea).  Patient instructed to limit alcohol consumption while taking this medication.  Colchicine may reduce blood counts especially with prolonged use.  The patient understands that monitoring of kidney function and blood counts may be required, especially at baseline. The patient verbalized understanding of the proper use and possible adverse effects of colchicine.  All of the patient's questions and concerns were addressed.
Birth Control Pills Pregnancy And Lactation Text: This medication should be avoided if pregnant and for the first 30 days post-partum.
Opzelura Counseling:  I discussed with the patient the risks of Opzelura including but not limited to nasopharngitis, bronchitis, ear infection, eosinophila, hives, diarrhea, folliculitis, tonsillitis, and rhinorrhea.  Taken orally, this medication has been linked to serious infections; higher rate of mortality; malignancy and lymphoproliferative disorders; major adverse cardiovascular events; thrombosis; thrombocytopenia, anemia, and neutropenia; and lipid elevations.
Minocycline Counseling: Patient advised regarding possible photosensitivity and discoloration of the teeth, skin, lips, tongue and gums.  Patient instructed to avoid sunlight, if possible.  When exposed to sunlight, patients should wear protective clothing, sunglasses, and sunscreen.  The patient was instructed to call the office immediately if the following severe adverse effects occur:  hearing changes, easy bruising/bleeding, severe headache, or vision changes.  The patient verbalized understanding of the proper use and possible adverse effects of minocycline.  All of the patient's questions and concerns were addressed.
Hydroxychloroquine Pregnancy And Lactation Text: This medication has been shown to cause fetal harm but it isn't assigned a Pregnancy Risk Category. There are small amounts excreted in breast milk.
Terbinafine Counseling: Patient counseling regarding adverse effects of terbinafine including but not limited to headache, diarrhea, rash, upset stomach, liver function test abnormalities, itching, taste/smell disturbance, nausea, abdominal pain, and flatulence.  There is a rare possibility of liver failure that can occur when taking terbinafine.  The patient understands that a baseline LFT and kidney function test may be required. The patient verbalized understanding of the proper use and possible adverse effects of terbinafine.  All of the patient's questions and concerns were addressed.
Cephalexin Pregnancy And Lactation Text: This medication is Pregnancy Category B and considered safe during pregnancy.  It is also excreted in breast milk but can be used safely for shorter doses.
Odomzo Counseling- I discussed with the patient the risks of Odomzo including but not limited to nausea, vomiting, diarrhea, constipation, weight loss, changes in the sense of taste, decreased appetite, muscle spasms, and hair loss.  The patient verbalized understanding of the proper use and possible adverse effects of Odomzo.  All of the patient's questions and concerns were addressed.
Litfulo Counseling: I discussed with the patient the risks of Litfulo therapy including but not limited to upper respiratory tract infections, shingles, cold sores, and nausea. Live vaccines should be avoided.  This medication has been linked to serious infections; higher rate of mortality; malignancy and lymphoproliferative disorders; major adverse cardiovascular events; thrombosis; gastrointestinal perforations; neutropenia; lymphopenia; anemia; liver enzyme elevations; and lipid elevations.
5-Fu Counseling: 5-Fluorouracil Counseling:  I discussed with the patient the risks of 5-fluorouracil including but not limited to erythema, scaling, itching, weeping, crusting, and pain.
Cimzia Counseling:  I discussed with the patient the risks of Cimzia including but not limited to immunosuppression, allergic reactions and infections.  The patient understands that monitoring is required including a PPD at baseline and must alert us or the primary physician if symptoms of infection or other concerning signs are noted.
Wartpeel Counseling:  I discussed with the patient the risks of Wartpeel including but not limited to erythema, scaling, itching, weeping, crusting, and pain.
Siliq Counseling:  I discussed with the patient the risks of Siliq including but not limited to new or worsening depression, suicidal thoughts and behavior, immunosuppression, malignancy, posterior leukoencephalopathy syndrome, and serious infections.  The patient understands that monitoring is required including a PPD at baseline and must alert us or the primary physician if symptoms of infection or other concerning signs are noted. There is also a special program designed to monitor depression which is required with Siliq.
Sski Pregnancy And Lactation Text: This medication is Pregnancy Category D and isn't considered safe during pregnancy. It is excreted in breast milk.
Oral Minoxidil Counseling- I discussed with the patient the risks of oral minoxidil including but not limited to shortness of breath, swelling of the feet or ankles, dizziness, lightheadedness, unwanted hair growth and allergic reaction.  The patient verbalized understanding of the proper use and possible adverse effects of oral minoxidil.  All of the patient's questions and concerns were addressed.
Xelmagdielz Pregnancy And Lactation Text: This medication is Pregnancy Category D and is not considered safe during pregnancy.  The risk during breast feeding is also uncertain.
Aklief Pregnancy And Lactation Text: It is unknown if this medication is safe to use during pregnancy.  It is unknown if this medication is excreted in breast milk.  Breastfeeding women should use the topical cream on the smallest area of the skin for the shortest time needed while breastfeeding.  Do not apply to nipple and areola.
Cyclophosphamide Pregnancy And Lactation Text: This medication is Pregnancy Category D and it isn't considered safe during pregnancy. This medication is excreted in breast milk.
Zoryve Pregnancy And Lactation Text: It is unknown if this medication can cause problems during pregnancy and breastfeeding.
Solaraze Counseling:  I discussed with the patient the risks of Solaraze including but not limited to erythema, scaling, itching, weeping, crusting, and pain.
Birth Control Pills Counseling: Birth Control Pill Counseling: I discussed with the patient the potential side effects of OCPs including but not limited to increased risk of stroke, heart attack, thrombophlebitis, deep venous thrombosis, hepatic adenomas, breast changes, GI upset, headaches, and depression.  The patient verbalized understanding of the proper use and possible adverse effects of OCPs. All of the patient's questions and concerns were addressed.
Opzelura Pregnancy And Lactation Text: There is insufficient data to evaluate drug-associated risk for major birth defects, miscarriage, or other adverse maternal or fetal outcomes.  There is a pregnancy registry that monitors pregnancy outcomes in pregnant persons exposed to the medication during pregnancy.  It is unknown if this medication is excreted in breast milk.  Do not breastfeed during treatment and for about 4 weeks after the last dose.
Albendazole Counseling:  I discussed with the patient the risks of albendazole including but not limited to cytopenia, kidney damage, nausea/vomiting and severe allergy.  The patient understands that this medication is being used in an off-label manner.
Clindamycin Counseling: I counseled the patient regarding use of clindamycin as an antibiotic for prophylactic and/or therapeutic purposes. Clindamycin is active against numerous classes of bacteria, including skin bacteria. Side effects may include nausea, diarrhea, gastrointestinal upset, rash, hives, yeast infections, and in rare cases, colitis.
Imiquimod Counseling:  I discussed with the patient the risks of imiquimod including but not limited to erythema, scaling, itching, weeping, crusting, and pain.  Patient understands that the inflammatory response to imiquimod is variable from person to person and was educated regarded proper titration schedule.  If flu-like symptoms develop, patient knows to discontinue the medication and contact us.
Libtayo Pregnancy And Lactation Text: This medication is contraindicated in pregnancy and when breast feeding.
Fluconazole Counseling:  Patient counseled regarding adverse effects of fluconazole including but not limited to headache, diarrhea, nausea, upset stomach, liver function test abnormalities, taste disturbance, and stomach pain.  There is a rare possibility of liver failure that can occur when taking fluconazole.  The patient understands that monitoring of LFTs and kidney function test may be required, especially at baseline. The patient verbalized understanding of the proper use and possible adverse effects of fluconazole.  All of the patient's questions and concerns were addressed.
Cibinqo Pregnancy And Lactation Text: It is unknown if this medication will adversely affect pregnancy or breast feeding.  You should not take this medication if you are currently pregnant or planning a pregnancy or while breastfeeding.
Hydroxychloroquine Counseling:  I discussed with the patient that a baseline ophthalmologic exam is needed at the start of therapy and every year thereafter while on therapy. A CBC may also be warranted for monitoring.  The side effects of this medication were discussed with the patient, including but not limited to agranulocytosis, aplastic anemia, seizures, rashes, retinopathy, and liver toxicity. Patient instructed to call the office should any adverse effect occur.  The patient verbalized understanding of the proper use and possible adverse effects of Plaquenil.  All the patient's questions and concerns were addressed.
Rituxan Pregnancy And Lactation Text: This medication is Pregnancy Category C and it isn't know if it is safe during pregnancy. It is unknown if this medication is excreted in breast milk but similar antibodies are known to be excreted.
Xeljanz Counseling: I discussed with the patient the risks of Xeljanz therapy including increased risk of infection, liver issues, headache, diarrhea, or cold symptoms. Live vaccines should be avoided. They were instructed to call if they have any problems.
Olanzapine Pregnancy And Lactation Text: This medication is pregnancy category C.   There are no adequate and well controlled trials with olanzapine in pregnant females.  Olanzapine should be used during pregnancy only if the potential benefit justifies the potential risk to the fetus.   In a study in lactating healthy women, olanzapine was excreted in breast milk.  It is recommended that women taking olanzapine should not breast feed.
Taltz Counseling: I discussed with the patient the risks of ixekizumab including but not limited to immunosuppression, serious infections, worsening of inflammatory bowel disease and drug reactions.  The patient understands that monitoring is required including a PPD at baseline and must alert us or the primary physician if symptoms of infection or other concerning signs are noted.
Azelaic Acid Counseling: Patient counseled that medicine may cause skin irritation and to avoid applying near the eyes.  In the event of skin irritation, the patient was advised to reduce the amount of the drug applied or use it less frequently.   The patient verbalized understanding of the proper use and possible adverse effects of azelaic acid.  All of the patient's questions and concerns were addressed.
Cyclophosphamide Counseling:  I discussed with the patient the risks of cyclophosphamide including but not limited to hair loss, hormonal abnormalities, decreased fertility, abdominal pain, diarrhea, nausea and vomiting, bone marrow suppression and infection. The patient understands that monitoring is required while taking this medication.
Topical Ketoconazole Counseling: Patient counseled that this medication may cause skin irritation or allergic reactions.  In the event of skin irritation, the patient was advised to reduce the amount of the drug applied or use it less frequently.   The patient verbalized understanding of the proper use and possible adverse effects of ketoconazole.  All of the patient's questions and concerns were addressed.
Cimzia Pregnancy And Lactation Text: This medication crosses the placenta but can be considered safe in certain situations. Cimzia may be excreted in breast milk.
SSKI Counseling:  I discussed with the patient the risks of SSKI including but not limited to thyroid abnormalities, metallic taste, GI upset, fever, headache, acne, arthralgias, paraesthesias, lymphadenopathy, easy bleeding, arrhythmias, and allergic reaction.
Solaraze Pregnancy And Lactation Text: This medication is Pregnancy Category B and is considered safe. There is some data to suggest avoiding during the third trimester. It is unknown if this medication is excreted in breast milk.
Zoryve Counseling:  I discussed with the patient that Zoryve is not for use in the eyes, mouth or vagina. The most commonly reported side effects include diarrhea, headache, insomnia, application site pain, upper respiratory tract infections, and urinary tract infections.  All of the patient's questions and concerns were addressed.
Finasteride Pregnancy And Lactation Text: This medication is absolutely contraindicated during pregnancy. It is unknown if it is excreted in breast milk.
Picato Counseling:  I discussed with the patient the risks of Picato including but not limited to erythema, scaling, itching, weeping, crusting, and pain.
Ilumya Counseling: I discussed with the patient the risks of tildrakizumab including but not limited to immunosuppression, malignancy, posterior leukoencephalopathy syndrome, and serious infections.  The patient understands that monitoring is required including a PPD at baseline and must alert us or the primary physician if symptoms of infection or other concerning signs are noted.
Clofazimine Counseling:  I discussed with the patient the risks of clofazimine including but not limited to skin and eye pigmentation, liver damage, nausea/vomiting, gastrointestinal bleeding and allergy.
Quinolones Counseling:  I discussed with the patient the risks of fluoroquinolones including but not limited to GI upset, allergic reaction, drug rash, diarrhea, dizziness, photosensitivity, yeast infections, liver function test abnormalities, tendonitis/tendon rupture.
Libtayo Counseling- I discussed with the patient the risks of Libtayo including but not limited to nausea, vomiting, diarrhea, and bone or muscle pain.  The patient verbalized understanding of the proper use and possible adverse effects of Libtayo.  All of the patient's questions and concerns were addressed.
Glycopyrrolate Pregnancy And Lactation Text: This medication is Pregnancy Category B and is considered safe during pregnancy. It is unknown if it is excreted breast milk.
Cibinqo Counseling: I discussed with the patient the risks of Cibinqo therapy including but not limited to common cold, nausea, headache, cold sores, increased blood CPK levels, dizziness, UTIs, fatigue, acne, and vomitting. Live vaccines should be avoided.  This medication has been linked to serious infections; higher rate of mortality; malignancy and lymphoproliferative disorders; major adverse cardiovascular events; thrombosis; thrombocytopenia and lymphopenia; lipid elevations; and retinal detachment.
Rituxan Counseling:  I discussed with the patient the risks of Rituxan infusions. Side effects can include infusion reactions, severe drug rashes including mucocutaneous reactions, reactivation of latent hepatitis and other infections and rarely progressive multifocal leukoencephalopathy.  All of the patient's questions and concerns were addressed.
Drysol Counseling:  I discussed with the patient the risks of drysol/aluminum chloride including but not limited to skin rash, itching, irritation, burning.
Clindamycin Pregnancy And Lactation Text: This medication can be used in pregnancy if certain situations. Clindamycin is also present in breast milk.
Olanzapine Counseling- I discussed with the patient the common side effects of olanzapine including but are not limited to: lack of energy, dry mouth, increased appetite, sleepiness, tremor, constipation, dizziness, changes in behavior, or restlessness.  Explained that teenagers are more likely to experience headaches, abdominal pain, pain in the arms or legs, tiredness, and sleepiness.  Serious side effects include but are not limited: increased risk of death in elderly patients who are confused, have memory loss, or dementia-related psychosis; hyperglycemia; increased cholesterol and triglycerides; and weight gain.
Sotyktu Pregnancy And Lactation Text: There is insufficient data to evaluate whether or not Sotyktu is safe to use during pregnancy.   It is not known if Sotyktu passes into breast milk and whether or not it is safe to use when breastfeeding.  
Propranolol Pregnancy And Lactation Text: This medication is Pregnancy Category C and it isn't known if it is safe during pregnancy. It is excreted in breast milk.
Cosentyx Counseling:  I discussed with the patient the risks of Cosentyx including but not limited to worsening of Crohn's disease, immunosuppression, allergic reactions and infections.  The patient understands that monitoring is required including a PPD at baseline and must alert us or the primary physician if symptoms of infection or other concerning signs are noted.
Cimetidine Counseling:  I discussed with the patient the risks of Cimetidine including but not limited to gynecomastia, headache, diarrhea, nausea, drowsiness, arrhythmias, pancreatitis, skin rashes, psychosis, bone marrow suppression and kidney toxicity.
Soolantra Counseling: I discussed with the patients the risks of topial Soolantra. This is a medicine which decreases the number of mites and inflammation in the skin. You experience burning, stinging, eye irritation or allergic reactions.  Please call our office if you develop any problems from using this medication.

## 2024-02-16 ENCOUNTER — APPOINTMENT (OUTPATIENT)
Dept: MEDICAL GROUP | Facility: IMAGING CENTER | Age: 34
End: 2024-02-16
Payer: COMMERCIAL

## 2024-02-25 DIAGNOSIS — R09.82 POST-NASAL DRIP: ICD-10-CM

## 2024-02-25 DIAGNOSIS — Z88.9 H/O SEASONAL ALLERGIES: ICD-10-CM

## 2024-02-26 RX ORDER — MONTELUKAST SODIUM 10 MG/1
10 TABLET ORAL DAILY
Qty: 90 TABLET | Refills: 1 | Status: SHIPPED | OUTPATIENT
Start: 2024-02-26

## 2024-03-07 ENCOUNTER — OFFICE VISIT (OUTPATIENT)
Dept: MEDICAL GROUP | Facility: IMAGING CENTER | Age: 34
End: 2024-03-07
Payer: COMMERCIAL

## 2024-03-07 VITALS
TEMPERATURE: 98.1 F | HEIGHT: 70 IN | OXYGEN SATURATION: 97 % | SYSTOLIC BLOOD PRESSURE: 132 MMHG | DIASTOLIC BLOOD PRESSURE: 84 MMHG | HEART RATE: 96 BPM | WEIGHT: 277 LBS | BODY MASS INDEX: 39.65 KG/M2 | RESPIRATION RATE: 16 BRPM

## 2024-03-07 DIAGNOSIS — Z13.21 ENCOUNTER FOR VITAMIN DEFICIENCY SCREENING: ICD-10-CM

## 2024-03-07 DIAGNOSIS — E55.9 VITAMIN D DEFICIENCY: ICD-10-CM

## 2024-03-07 DIAGNOSIS — D72.9 NEUTROPHILIA: ICD-10-CM

## 2024-03-07 DIAGNOSIS — Z13.0 SCREENING FOR DEFICIENCY ANEMIA: ICD-10-CM

## 2024-03-07 DIAGNOSIS — Z13.29 SCREENING FOR THYROID DISORDER: ICD-10-CM

## 2024-03-07 DIAGNOSIS — E78.5 DYSLIPIDEMIA: Chronic | ICD-10-CM

## 2024-03-07 DIAGNOSIS — Z00.00 WELLNESS EXAMINATION: ICD-10-CM

## 2024-03-07 DIAGNOSIS — E66.01 CLASS 2 SEVERE OBESITY DUE TO EXCESS CALORIES WITH SERIOUS COMORBIDITY AND BODY MASS INDEX (BMI) OF 39.0 TO 39.9 IN ADULT (HCC): ICD-10-CM

## 2024-03-07 DIAGNOSIS — R71.8 MICROCYTOSIS: ICD-10-CM

## 2024-03-07 DIAGNOSIS — Z13.1 DIABETES MELLITUS SCREENING: ICD-10-CM

## 2024-03-07 DIAGNOSIS — Z13.220 SCREENING CHOLESTEROL LEVEL: ICD-10-CM

## 2024-03-07 DIAGNOSIS — K21.9 LARYNGOPHARYNGEAL REFLUX (LPR): ICD-10-CM

## 2024-03-07 DIAGNOSIS — D75.1 ERYTHROCYTOSIS: ICD-10-CM

## 2024-03-07 PROCEDURE — 99395 PREV VISIT EST AGE 18-39: CPT | Performed by: PHYSICIAN ASSISTANT

## 2024-03-07 PROCEDURE — 3075F SYST BP GE 130 - 139MM HG: CPT | Performed by: PHYSICIAN ASSISTANT

## 2024-03-07 PROCEDURE — 3079F DIAST BP 80-89 MM HG: CPT | Performed by: PHYSICIAN ASSISTANT

## 2024-03-07 RX ORDER — DOXYCYCLINE HYCLATE 100 MG/1
CAPSULE ORAL
COMMUNITY
Start: 2024-01-12 | End: 2024-03-07

## 2024-03-07 RX ORDER — PANTOPRAZOLE SODIUM 40 MG/1
40 TABLET, DELAYED RELEASE ORAL DAILY
Qty: 90 TABLET | Refills: 1 | Status: SHIPPED | OUTPATIENT
Start: 2024-03-07

## 2024-03-07 SDOH — HEALTH STABILITY: PHYSICAL HEALTH: ON AVERAGE, HOW MANY DAYS PER WEEK DO YOU ENGAGE IN MODERATE TO STRENUOUS EXERCISE (LIKE A BRISK WALK)?: 1 DAY

## 2024-03-07 SDOH — ECONOMIC STABILITY: FOOD INSECURITY: WITHIN THE PAST 12 MONTHS, THE FOOD YOU BOUGHT JUST DIDN'T LAST AND YOU DIDN'T HAVE MONEY TO GET MORE.: NEVER TRUE

## 2024-03-07 SDOH — ECONOMIC STABILITY: HOUSING INSECURITY: IN THE LAST 12 MONTHS, HOW MANY PLACES HAVE YOU LIVED?: 1

## 2024-03-07 SDOH — ECONOMIC STABILITY: INCOME INSECURITY: IN THE LAST 12 MONTHS, WAS THERE A TIME WHEN YOU WERE NOT ABLE TO PAY THE MORTGAGE OR RENT ON TIME?: NO

## 2024-03-07 SDOH — HEALTH STABILITY: MENTAL HEALTH
STRESS IS WHEN SOMEONE FEELS TENSE, NERVOUS, ANXIOUS, OR CAN'T SLEEP AT NIGHT BECAUSE THEIR MIND IS TROUBLED. HOW STRESSED ARE YOU?: ONLY A LITTLE

## 2024-03-07 SDOH — ECONOMIC STABILITY: INCOME INSECURITY: HOW HARD IS IT FOR YOU TO PAY FOR THE VERY BASICS LIKE FOOD, HOUSING, MEDICAL CARE, AND HEATING?: NOT HARD AT ALL

## 2024-03-07 SDOH — ECONOMIC STABILITY: FOOD INSECURITY: WITHIN THE PAST 12 MONTHS, YOU WORRIED THAT YOUR FOOD WOULD RUN OUT BEFORE YOU GOT MONEY TO BUY MORE.: NEVER TRUE

## 2024-03-07 SDOH — HEALTH STABILITY: PHYSICAL HEALTH: ON AVERAGE, HOW MANY MINUTES DO YOU ENGAGE IN EXERCISE AT THIS LEVEL?: 10 MIN

## 2024-03-07 ASSESSMENT — SOCIAL DETERMINANTS OF HEALTH (SDOH)
HOW OFTEN DO YOU ATTENT MEETINGS OF THE CLUB OR ORGANIZATION YOU BELONG TO?: MORE THAN 4 TIMES PER YEAR
HOW OFTEN DO YOU GET TOGETHER WITH FRIENDS OR RELATIVES?: ONCE A WEEK
WITHIN THE PAST 12 MONTHS, YOU WORRIED THAT YOUR FOOD WOULD RUN OUT BEFORE YOU GOT THE MONEY TO BUY MORE: NEVER TRUE
IN A TYPICAL WEEK, HOW MANY TIMES DO YOU TALK ON THE PHONE WITH FAMILY, FRIENDS, OR NEIGHBORS?: ONCE A WEEK
DO YOU BELONG TO ANY CLUBS OR ORGANIZATIONS SUCH AS CHURCH GROUPS UNIONS, FRATERNAL OR ATHLETIC GROUPS, OR SCHOOL GROUPS?: YES
IN A TYPICAL WEEK, HOW MANY TIMES DO YOU TALK ON THE PHONE WITH FAMILY, FRIENDS, OR NEIGHBORS?: ONCE A WEEK
DO YOU BELONG TO ANY CLUBS OR ORGANIZATIONS SUCH AS CHURCH GROUPS UNIONS, FRATERNAL OR ATHLETIC GROUPS, OR SCHOOL GROUPS?: YES
HOW OFTEN DO YOU ATTEND CHURCH OR RELIGIOUS SERVICES?: NEVER
HOW OFTEN DO YOU HAVE A DRINK CONTAINING ALCOHOL: NEVER
HOW MANY DRINKS CONTAINING ALCOHOL DO YOU HAVE ON A TYPICAL DAY WHEN YOU ARE DRINKING: PATIENT DOES NOT DRINK
HOW OFTEN DO YOU ATTENT MEETINGS OF THE CLUB OR ORGANIZATION YOU BELONG TO?: MORE THAN 4 TIMES PER YEAR
HOW OFTEN DO YOU ATTEND CHURCH OR RELIGIOUS SERVICES?: NEVER
HOW OFTEN DO YOU GET TOGETHER WITH FRIENDS OR RELATIVES?: ONCE A WEEK
HOW OFTEN DO YOU HAVE SIX OR MORE DRINKS ON ONE OCCASION: NEVER
HOW HARD IS IT FOR YOU TO PAY FOR THE VERY BASICS LIKE FOOD, HOUSING, MEDICAL CARE, AND HEATING?: NOT HARD AT ALL

## 2024-03-07 ASSESSMENT — FIBROSIS 4 INDEX: FIB4 SCORE: 0.4

## 2024-03-07 ASSESSMENT — LIFESTYLE VARIABLES
HOW OFTEN DO YOU HAVE SIX OR MORE DRINKS ON ONE OCCASION: NEVER
HOW OFTEN DO YOU HAVE A DRINK CONTAINING ALCOHOL: NEVER
HOW MANY STANDARD DRINKS CONTAINING ALCOHOL DO YOU HAVE ON A TYPICAL DAY: PATIENT DOES NOT DRINK
AUDIT-C TOTAL SCORE: 0
SKIP TO QUESTIONS 9-10: 1

## 2024-03-07 NOTE — ASSESSMENT & PLAN NOTE
Patient with chronic intermittent indigestion, throat clearing, nocturnal cough.  Has been on pantoprazole 40 mg daily.  States this has been controlling his symptoms overall, but there are still some foods that exacerbate his symptoms.  He is interested in meeting with GI about this.  No abdominal pain or diarrhea.

## 2024-03-07 NOTE — PATIENT INSTRUCTIONS
Please continue working on lifestyle modifications.  There are 4 types of exercise that are recommended.  Endurance training includes 150 minutes or more of moderate-intensity activity each week as tolerated.  It is also recommended to incorporate exercises to help with flexibility and balance twice per week, and weight/resistance training twice per week.  The benefits of weight training include increased strength of the bones, muscles, and connective tissues which ultimately helps to stabilize your joints, lower risk of injury, decrease fall risk, improve your metabolism, and improve quality of life long-term.  If you are weightlifting twice a week, one day can be focus more on your lower body and the other on your upper body.  If you need assistance with a weight training program, please consult with a certified  or schedule a consultation in my office for guidance.    Additionally, please follow a healthy diet that is low in saturated fat, sodium, and cholesterol, such as the mediterranean diet.  Please focus on a diet that is high in fruits, vegetables, whole grains, beans, nuts, and seeds, includes olive oil as an important source of monounsaturated fat. You may also consider a plant-based diet.  Please also increase your dietary fiber intake to 25 to 30 g a day and limit added sugar to <30 g/day.      In regards to alcohol intake, the 2020 to 2025 Dietary Guidelines for Americans advise no more than two drinks per day for males and one drink per day for nonpregnant females, although complete cessation would be ideal to reduce risks of long term health effects from alcohol.    Please consume at least 5-6 servings of fruits and vegetables per day. Studies show that consuming 5 servings of fruits and vegetables per day can increase life expectancy by 3 years.  Even eating 2.5 servings of fruits and vegetables per day can be associated with a 16% reduced risk of heart disease, 18% reduced risk of stroke, 4%  reduced risk of cancer, and 15% reduced risk of premature death.    I would also recommend protein consumption with every meal.  Protein sources include beans, lentils, chickpeas, nuts and seeds, eggs, high protein yogurt (>15 g), cottage cheese, lean meats such as salmon and tuna, soy products i.e. tofu, as well as supplementary protein powders that are low in added sugar.      It was a pleasure meeting with you today at [facility]!    Your medical history/records and medications were reviewed today.     UPDATE on MyChart Results: If you have blood work, and/or imaging studies, or any other test or procedure completed, you will have access to results as soon as they become available in MyChart. Recently, these results will be available for review at the same time that your provider is able to see results!    This will likely mean you will see a result before your provider has had a chance to review and discuss with you.  Some results or care notes may be hard to understand and may be serious in nature.    We look at every result and your provider will contact you to explain what they mean and discuss appropriate next steps. Please allow for at least 72 business hours for chart and result review.     We prefer that you wait for your care team to contact you with your results.  Often, your provider will discuss your results with you at your next appointment. We look forward to continuing to partner with you in your care.    Please review my practice information below:    If you have any prescription refill requests, please send them via [facility] or discuss with your provider at the start of your office visits. Please allow 3-5 business days for lab and testing review and you will be contacted via [facility] with those results, or if advised to make a follow up appointment regarding those results, then please do so.     Once resulted, your lab/test/imaging results will show up automatically in your MyChart.  Please wait for my interpretation and recommendations prior to viewing your results to avoid any unnecessary confusion or misinterpretation. I will address all of the lab values that I interpret as abnormal and message you accordingly on your [facility]. I will always send you a message about your results even if they are normal. If you do not hear back from me within 5-7 business days after completing your tests, then please send me a message on [facility] so I can obtain your results (especially if you went to an outside lab or imaging center - [facility], [facility], etc).     If you have any additional questions or concerns beyond my interpretation of your results, please make an appointment with me to discuss in further detail.    Please only use the [facility] messaging system for questions regarding your most recent appointment or if advised to use otherwise (glucose or blood pressure reporting).     If you have any new problems or concerns, you must make an appointment to discuss. This includes any referral requests, lab requests (unless advised to notify me for pre-appt labs), medication side effects, or request for medication adjustments.     Please arrive 15 minutes prior to your appointment time to complete your check-in and intake with the medical assistant.      Thank you,    Melody Zuniga PA-C ([place])  Physician Assistant Certified  [facility]    -----------------------------------------------------------------    Attn: Patients of [facility]:    In an effort to continue to provide excellent and efficient care to our patients, it is vital that we continue to use our resources appropriately. With that, this is a reminder that [facility] is used for prescription refill requests, test results, virtual visits, and chart review only.     Any new questions, concerns/conditions, lab/imaging requests, medication adjustments, new prescriptions, or referral requests do require an appointment (virtually  or in person), unless discussed otherwise at your most recent appointment.     Thank you for your understanding,    [facility]

## 2024-03-07 NOTE — PROGRESS NOTES
Subjective:     CC:   Chief Complaint   Patient presents with    Annual Exam       HPI:   Chao presents today to discuss:    Laryngopharyngeal reflux (LPR)  Patient with chronic intermittent indigestion, throat clearing, nocturnal cough.  Has been on pantoprazole 40 mg daily.  States this has been controlling his symptoms overall, but there are still some foods that exacerbate his symptoms.  He is interested in meeting with GI about this.  No abdominal pain or diarrhea.    Health Maintenance/Anticipatory Guidance:  Diet: eats 3 meals per day, 3 servings of fruits/veggies per day  Exercise: walking  Substance Abuse: no history  Safe in Relationship: yes  Sun Protection/Sunscreen: yes   Dermatologist: ANGELES  Dentist: twice yearly appointments  Eye Doctor: due for appt  Seatbelts, helmets, and gun safety discussed    Cancer Screening: reviewed    Infectious Disease Screening:  STI/HIV screening: declined    Immunizations: will get flu shot at the pharmacy    Past Medical History:   Diagnosis Date    Acute respiratory failure due to COVID-19 (HCC) 09/10/2021    Allergy     Dyslipidemia     History of COVID-19 12/3/2021    Morning headache     Pneumonia due to COVID-19 virus 12/01/2021    Wears glasses      Family History   Problem Relation Age of Onset    Alcohol abuse Mother     Hypertension Father     Drug abuse Sister     Psychiatric Illness Sister         schizophrenia    Drug abuse Sister     Cancer Neg Hx     Diabetes Neg Hx     Heart Disease Neg Hx     Stroke Neg Hx      Past Surgical History:   Procedure Laterality Date    OTHER 2005    triple arthrodesis    VASECTOMY       Social History     Tobacco Use    Smoking status: Never    Smokeless tobacco: Never   Vaping Use    Vaping Use: Never used   Substance Use Topics    Alcohol use: Not Currently     Comment: Less than 1/month    Drug use: Never     Social History     Social History Narrative    , 3 kids     and      Current  "Outpatient Medications Ordered in Epic   Medication Sig Dispense Refill    pantoprazole (PROTONIX) 40 MG Tablet Delayed Response Take 1 Tablet by mouth every day. 90 Tablet 1    montelukast (SINGULAIR) 10 MG Tab TAKE 1 TABLET BY MOUTH EVERY DAY 90 Tablet 1    fluticasone (FLONASE) 50 MCG/ACT nasal spray Administer 1-2 Sprays into affected nostril(S) every day. Each nostril. 16 g 6    albuterol 108 (90 Base) MCG/ACT Aero Soln inhalation aerosol INHALE TWO PUFFS BY MOUTH EVERY 4 HOURS AS NEEDED FOR SHORTNESS OF BREATH 18 g 0    acetaminophen (TYLENOL) 500 MG Tab Take 500 mg by mouth every four hours as needed for Mild Pain or Moderate Pain.       No current Epic-ordered facility-administered medications on file.     Patient has no known allergies.    PMH/PSH/FH/Social history reviewed.  Vaccinations discussed.  Previous records and labs reviewed. Discussed age appropriate anticipatory guidance.    ROS: see hpi  Gen: no fevers/chills  Pulm: no sob, no cough  CV: no chest pain, no palpitations, no edema  GI: no nausea/vomiting, no diarrhea  Skin: no rash    Objective:   Exam:  /84 (BP Location: Left arm, Patient Position: Sitting, BP Cuff Size: Adult)   Pulse 96   Temp 36.7 °C (98.1 °F) (Temporal)   Resp 16   Ht 1.778 m (5' 10\")   Wt (!) 126 kg (277 lb)   SpO2 97%   BMI 39.75 kg/m²    Body mass index is 39.75 kg/m².    Gen: Alert and oriented, No apparent distress.  HEENT: Head atraumatic, normocephalic. Pupils equal and round.  Bilateral TMs pearly gray.  Posterior pharynx clear, no exudate, erythema, postnasal drip.  Neck: Neck is supple without lymphadenopathy.   Lungs: Normal effort, CTA bilaterally, no wheezes, rhonchi, or rales  CV: Regular rate and rhythm. No murmurs, rubs, or gallops.  ABD: +BS. Non-tender, non-distended. No rebound, rigidity, or guarding.  Ext: No clubbing, cyanosis, edema.    Assessment & Plan:     33 y.o. male with the following -     1. Wellness examination  PMH/PSH/FH/Social " history reviewed.  Vaccinations discussed.  Previous records and labs reviewed. Discussed age appropriate anticipatory guidance.    2. Class 2 severe obesity due to excess calories with serious comorbidity and body mass index (BMI) of 39.0 to 39.9 in adult (HCC)  Chronic, uncontrolled. Please continue working on lifestyle modifications.  There are 4 types of exercise that are recommended.  Endurance training includes 150 minutes or more of moderate-intensity activity each week as tolerated.  It is also recommended to incorporate exercises to help with flexibility and balance twice per week, and weight/resistance training twice per week.  The benefits of weight training include increased strength of the bones, muscles, and connective tissues which ultimately helps to stabilize your joints, lower risk of injury, decrease fall risk, improve your metabolism, and improve quality of life long-term.  If you are weightlifting twice a week, one day can be focus more on your lower body and the other on your upper body.  If you need assistance with a weight training program, please consult with a certified  or schedule a consultation in my office for guidance.    Additionally, please follow a healthy diet that is low in saturated fat, sodium, and cholesterol, such as the mediterranean diet.  Please focus on a diet that is high in fruits, vegetables, whole grains, beans, nuts, and seeds, includes olive oil as an important source of monounsaturated fat. You may also consider a plant-based diet.  Please also increase your dietary fiber intake to 25 to 30 g a day and limit added sugar to <30 g/day.      In regards to alcohol intake, the 2020 to 2025 Dietary Guidelines for Americans advise no more than two drinks per day for males and one drink per day for nonpregnant females, although complete cessation would be ideal to reduce risks of long term health effects from alcohol.    Please consume at least 5-6 servings of fruits  and vegetables per day. Studies show that consuming 5 servings of fruits and vegetables per day can increase life expectancy by 3 years.  Even eating 2.5 servings of fruits and vegetables per day can be associated with a 16% reduced risk of heart disease, 18% reduced risk of stroke, 4% reduced risk of cancer, and 15% reduced risk of premature death.    I would also recommend protein consumption with every meal.  Protein sources include beans, lentils, chickpeas, nuts and seeds, eggs, high protein yogurt (>15 g), cottage cheese, lean meats such as salmon and tuna, soy products i.e. tofu, as well as supplementary protein powders that are low in added sugar.  - HEMOGLOBIN A1C; Future  - Lipid Profile; Future    3. Vitamin D deficiency  - VITAMIN D,25 HYDROXY (DEFICIENCY); Future    4. Erythrocytosis  - CBC WITH DIFFERENTIAL; Future  - IRON/TOTAL IRON BIND; Future  - FERRITIN; Future    5. Microcytosis  - CBC WITH DIFFERENTIAL; Future  - IRON/TOTAL IRON BIND; Future  - FERRITIN; Future    6. Neutrophilia  - CBC WITH DIFFERENTIAL; Future    7. Dyslipidemia  - Lipid Profile; Future    8. Laryngopharyngeal reflux (LPR)  Chronic, uncontrolled.  Refill pantoprazole, GERD diet.  Refer to GI for consultation.  - Referral to Gastroenterology  - pantoprazole (PROTONIX) 40 MG Tablet Delayed Response; Take 1 Tablet by mouth every day.  Dispense: 90 Tablet; Refill: 1    9. Diabetes mellitus screening  - HEMOGLOBIN A1C; Future  - Comp Metabolic Panel; Future    10. Screening for deficiency anemia  - CBC WITH DIFFERENTIAL; Future  - IRON/TOTAL IRON BIND; Future  - FERRITIN; Future    11. Encounter for vitamin deficiency screening  - VITAMIN D,25 HYDROXY (DEFICIENCY); Future    12. Screening cholesterol level  - Lipid Profile; Future    13. Screening for thyroid disorder  - TSH WITH REFLEX TO FT4; Future    Return for Will notify patient to follow-up pending tests.    Melody Zuniga PA-C ([place])  Physician Assistant Certified  [facility]  [facility]    Please note that this dictation was created using voice recognition software. I have made every reasonable attempt to correct obvious errors, but I expect that there are errors of grammar and possibly content that I did not discover before finalizing the note.

## 2024-03-18 ENCOUNTER — APPOINTMENT (OUTPATIENT)
Dept: SLEEP MEDICINE | Facility: MEDICAL CENTER | Age: 34
End: 2024-03-18
Attending: STUDENT IN AN ORGANIZED HEALTH CARE EDUCATION/TRAINING PROGRAM
Payer: COMMERCIAL

## 2024-03-30 ENCOUNTER — HOSPITAL ENCOUNTER (OUTPATIENT)
Dept: LAB | Facility: MEDICAL CENTER | Age: 34
End: 2024-03-30
Attending: PHYSICIAN ASSISTANT
Payer: COMMERCIAL

## 2024-03-30 DIAGNOSIS — R71.8 MICROCYTOSIS: ICD-10-CM

## 2024-03-30 DIAGNOSIS — E78.5 DYSLIPIDEMIA: Chronic | ICD-10-CM

## 2024-03-30 DIAGNOSIS — Z13.1 DIABETES MELLITUS SCREENING: ICD-10-CM

## 2024-03-30 DIAGNOSIS — Z13.29 SCREENING FOR THYROID DISORDER: ICD-10-CM

## 2024-03-30 DIAGNOSIS — Z13.21 ENCOUNTER FOR VITAMIN DEFICIENCY SCREENING: ICD-10-CM

## 2024-03-30 DIAGNOSIS — E55.9 VITAMIN D DEFICIENCY: ICD-10-CM

## 2024-03-30 DIAGNOSIS — Z13.220 SCREENING CHOLESTEROL LEVEL: ICD-10-CM

## 2024-03-30 DIAGNOSIS — D75.1 ERYTHROCYTOSIS: ICD-10-CM

## 2024-03-30 DIAGNOSIS — D72.9 NEUTROPHILIA: ICD-10-CM

## 2024-03-30 DIAGNOSIS — E66.01 CLASS 2 SEVERE OBESITY DUE TO EXCESS CALORIES WITH SERIOUS COMORBIDITY AND BODY MASS INDEX (BMI) OF 39.0 TO 39.9 IN ADULT (HCC): ICD-10-CM

## 2024-03-30 DIAGNOSIS — Z13.0 SCREENING FOR DEFICIENCY ANEMIA: ICD-10-CM

## 2024-03-30 LAB
25(OH)D3 SERPL-MCNC: 13 NG/ML (ref 30–100)
ALBUMIN SERPL BCP-MCNC: 4.4 G/DL (ref 3.2–4.9)
ALBUMIN/GLOB SERPL: 1.6 G/DL
ALP SERPL-CCNC: 127 U/L (ref 30–99)
ALT SERPL-CCNC: 48 U/L (ref 2–50)
ANION GAP SERPL CALC-SCNC: 13 MMOL/L (ref 7–16)
AST SERPL-CCNC: 32 U/L (ref 12–45)
BASOPHILS # BLD AUTO: 0.6 % (ref 0–1.8)
BASOPHILS # BLD: 0.07 K/UL (ref 0–0.12)
BILIRUB SERPL-MCNC: 0.3 MG/DL (ref 0.1–1.5)
BUN SERPL-MCNC: 14 MG/DL (ref 8–22)
CALCIUM ALBUM COR SERPL-MCNC: 8.8 MG/DL (ref 8.5–10.5)
CALCIUM SERPL-MCNC: 9.1 MG/DL (ref 8.5–10.5)
CHLORIDE SERPL-SCNC: 102 MMOL/L (ref 96–112)
CHOLEST SERPL-MCNC: 179 MG/DL (ref 100–199)
CO2 SERPL-SCNC: 23 MMOL/L (ref 20–33)
CREAT SERPL-MCNC: 0.8 MG/DL (ref 0.5–1.4)
EOSINOPHIL # BLD AUTO: 0.28 K/UL (ref 0–0.51)
EOSINOPHIL NFR BLD: 2.5 % (ref 0–6.9)
ERYTHROCYTE [DISTWIDTH] IN BLOOD BY AUTOMATED COUNT: 42.3 FL (ref 35.9–50)
EST. AVERAGE GLUCOSE BLD GHB EST-MCNC: 128 MG/DL
FASTING STATUS PATIENT QL REPORTED: NORMAL
FERRITIN SERPL-MCNC: 86.2 NG/ML (ref 22–322)
GFR SERPLBLD CREATININE-BSD FMLA CKD-EPI: 118 ML/MIN/1.73 M 2
GLOBULIN SER CALC-MCNC: 2.8 G/DL (ref 1.9–3.5)
GLUCOSE SERPL-MCNC: 94 MG/DL (ref 65–99)
HBA1C MFR BLD: 6.1 % (ref 4–5.6)
HCT VFR BLD AUTO: 49.3 % (ref 42–52)
HDLC SERPL-MCNC: 30 MG/DL
HGB BLD-MCNC: 15.4 G/DL (ref 14–18)
IMM GRANULOCYTES # BLD AUTO: 0.06 K/UL (ref 0–0.11)
IMM GRANULOCYTES NFR BLD AUTO: 0.5 % (ref 0–0.9)
IRON SATN MFR SERPL: 14 % (ref 15–55)
IRON SERPL-MCNC: 41 UG/DL (ref 50–180)
LDLC SERPL CALC-MCNC: 124 MG/DL
LYMPHOCYTES # BLD AUTO: 1.7 K/UL (ref 1–4.8)
LYMPHOCYTES NFR BLD: 15.1 % (ref 22–41)
MCH RBC QN AUTO: 24.5 PG (ref 27–33)
MCHC RBC AUTO-ENTMCNC: 31.2 G/DL (ref 32.3–36.5)
MCV RBC AUTO: 78.5 FL (ref 81.4–97.8)
MONOCYTES # BLD AUTO: 0.86 K/UL (ref 0–0.85)
MONOCYTES NFR BLD AUTO: 7.7 % (ref 0–13.4)
NEUTROPHILS # BLD AUTO: 8.26 K/UL (ref 1.82–7.42)
NEUTROPHILS NFR BLD: 73.6 % (ref 44–72)
NRBC # BLD AUTO: 0 K/UL
NRBC BLD-RTO: 0 /100 WBC (ref 0–0.2)
PLATELET # BLD AUTO: 357 K/UL (ref 164–446)
PMV BLD AUTO: 10.7 FL (ref 9–12.9)
POTASSIUM SERPL-SCNC: 4.2 MMOL/L (ref 3.6–5.5)
PROT SERPL-MCNC: 7.2 G/DL (ref 6–8.2)
RBC # BLD AUTO: 6.28 M/UL (ref 4.7–6.1)
SODIUM SERPL-SCNC: 138 MMOL/L (ref 135–145)
TIBC SERPL-MCNC: 297 UG/DL (ref 250–450)
TRIGL SERPL-MCNC: 127 MG/DL (ref 0–149)
TSH SERPL DL<=0.005 MIU/L-ACNC: 2.29 UIU/ML (ref 0.38–5.33)
UIBC SERPL-MCNC: 256 UG/DL (ref 110–370)
WBC # BLD AUTO: 11.2 K/UL (ref 4.8–10.8)

## 2024-03-30 PROCEDURE — 84443 ASSAY THYROID STIM HORMONE: CPT

## 2024-03-30 PROCEDURE — 83036 HEMOGLOBIN GLYCOSYLATED A1C: CPT

## 2024-03-30 PROCEDURE — 80061 LIPID PANEL: CPT

## 2024-03-30 PROCEDURE — 82728 ASSAY OF FERRITIN: CPT

## 2024-03-30 PROCEDURE — 80053 COMPREHEN METABOLIC PANEL: CPT

## 2024-03-30 PROCEDURE — 83540 ASSAY OF IRON: CPT

## 2024-03-30 PROCEDURE — 83550 IRON BINDING TEST: CPT

## 2024-03-30 PROCEDURE — 85025 COMPLETE CBC W/AUTO DIFF WBC: CPT

## 2024-03-30 PROCEDURE — 36415 COLL VENOUS BLD VENIPUNCTURE: CPT

## 2024-03-30 PROCEDURE — 82306 VITAMIN D 25 HYDROXY: CPT

## 2024-04-01 DIAGNOSIS — R71.8 MICROCYTOSIS: ICD-10-CM

## 2024-04-01 DIAGNOSIS — D75.1 ERYTHROCYTOSIS: ICD-10-CM

## 2024-04-01 DIAGNOSIS — E55.9 VITAMIN D DEFICIENCY: ICD-10-CM

## 2024-04-01 DIAGNOSIS — D72.9 NEUTROPHILIA: ICD-10-CM

## 2024-04-01 RX ORDER — ERGOCALCIFEROL 1.25 MG/1
50000 CAPSULE ORAL
Qty: 12 CAPSULE | Refills: 0 | Status: SHIPPED | OUTPATIENT
Start: 2024-04-01

## 2024-04-04 DIAGNOSIS — R53.82 CHRONIC FATIGUE: ICD-10-CM

## 2024-05-17 ENCOUNTER — APPOINTMENT (RX ONLY)
Dept: URBAN - METROPOLITAN AREA CLINIC 31 | Facility: CLINIC | Age: 34
Setting detail: DERMATOLOGY
End: 2024-05-17

## 2024-05-17 DIAGNOSIS — L82.0 INFLAMED SEBORRHEIC KERATOSIS: ICD-10-CM

## 2024-05-17 DIAGNOSIS — L663 OTHER SPECIFIED DISEASES OF HAIR AND HAIR FOLLICLES: ICD-10-CM

## 2024-05-17 DIAGNOSIS — L73.9 FOLLICULAR DISORDER, UNSPECIFIED: ICD-10-CM

## 2024-05-17 DIAGNOSIS — L738 OTHER SPECIFIED DISEASES OF HAIR AND HAIR FOLLICLES: ICD-10-CM

## 2024-05-17 PROBLEM — L02.426 FURUNCLE OF LEFT LOWER LIMB: Status: ACTIVE | Noted: 2024-05-17

## 2024-05-17 PROBLEM — L02.223 FURUNCLE OF CHEST WALL: Status: ACTIVE | Noted: 2024-05-17

## 2024-05-17 PROBLEM — L02.222 FURUNCLE OF BACK [ANY PART, EXCEPT BUTTOCK]: Status: ACTIVE | Noted: 2024-05-17

## 2024-05-17 PROCEDURE — ? ADDITIONAL NOTES

## 2024-05-17 PROCEDURE — ? PRESCRIPTION

## 2024-05-17 PROCEDURE — 99213 OFFICE O/P EST LOW 20 MIN: CPT

## 2024-05-17 PROCEDURE — ? COUNSELING

## 2024-05-17 RX ORDER — SULFACETAMIDE SODIUM AND SULFUR 10; 5 MG/G; MG/G
RINSE TOPICAL
Qty: 340.2 | Refills: 11 | Status: ERX | COMMUNITY
Start: 2024-05-17

## 2024-05-17 RX ADMIN — SULFACETAMIDE SODIUM AND SULFUR: 10; 5 RINSE TOPICAL at 00:00

## 2024-05-17 ASSESSMENT — LOCATION ZONE DERM
LOCATION ZONE: ARM
LOCATION ZONE: TRUNK
LOCATION ZONE: LEG

## 2024-05-17 ASSESSMENT — LOCATION DETAILED DESCRIPTION DERM
LOCATION DETAILED: RIGHT SUPERIOR MEDIAL UPPER BACK
LOCATION DETAILED: LEFT MEDIAL SUPERIOR CHEST
LOCATION DETAILED: LEFT ANTERIOR PROXIMAL THIGH
LOCATION DETAILED: RIGHT ANTECUBITAL SKIN

## 2024-05-17 ASSESSMENT — LOCATION SIMPLE DESCRIPTION DERM
LOCATION SIMPLE: CHEST
LOCATION SIMPLE: LEFT THIGH
LOCATION SIMPLE: RIGHT ELBOW
LOCATION SIMPLE: RIGHT UPPER BACK

## 2024-05-17 NOTE — PROCEDURE: ADDITIONAL NOTES
Render Risk Assessment In Note?: no
Additional Notes: d/c doxycycline\\nRTC if worsening
Detail Level: Detailed

## 2024-05-31 DIAGNOSIS — K21.9 LARYNGOPHARYNGEAL REFLUX (LPR): ICD-10-CM

## 2024-05-31 RX ORDER — PANTOPRAZOLE SODIUM 40 MG/1
40 TABLET, DELAYED RELEASE ORAL DAILY
Qty: 90 TABLET | Refills: 1 | Status: SHIPPED | OUTPATIENT
Start: 2024-05-31

## 2024-05-31 NOTE — TELEPHONE ENCOUNTER
Received request via: Pharmacy    Was the patient seen in the last year in this department? Yes    Does the patient have an active prescription (recently filled or refills available) for medication(s) requested? No    Pharmacy Name: [facility] #[id number]    Does the patient have care home Plus and need 100 day supply (blood pressure, diabetes and cholesterol meds only)? Patient does not have SCP

## 2024-07-01 DIAGNOSIS — E55.9 VITAMIN D DEFICIENCY: ICD-10-CM

## 2024-07-01 RX ORDER — ERGOCALCIFEROL 1.25 MG/1
50000 CAPSULE ORAL
Qty: 12 CAPSULE | Refills: 0 | Status: SHIPPED | OUTPATIENT
Start: 2024-07-01

## 2025-03-31 ENCOUNTER — APPOINTMENT (OUTPATIENT)
Dept: MEDICAL GROUP | Facility: IMAGING CENTER | Age: 35
End: 2025-03-31
Payer: COMMERCIAL

## 2025-04-16 DIAGNOSIS — K21.9 LARYNGOPHARYNGEAL REFLUX (LPR): ICD-10-CM

## 2025-04-16 NOTE — TELEPHONE ENCOUNTER
Received request via: Patient    Was the patient seen in the last year in this department? No. Has appt 5/20    Does the patient have an active prescription (recently filled or refills available) for medication(s) requested? No    Pharmacy Name: [facility] [id number]    Does the patient have custodial Plus and need 100-day supply? (This applies to ALL medications) Patient does not have SCP

## 2025-04-17 RX ORDER — PANTOPRAZOLE SODIUM 40 MG/1
40 TABLET, DELAYED RELEASE ORAL DAILY
Qty: 90 TABLET | Refills: 0 | Status: SHIPPED | OUTPATIENT
Start: 2025-04-17

## 2025-05-20 ENCOUNTER — APPOINTMENT (OUTPATIENT)
Dept: MEDICAL GROUP | Facility: IMAGING CENTER | Age: 35
End: 2025-05-20
Payer: COMMERCIAL

## 2025-07-07 ENCOUNTER — APPOINTMENT (OUTPATIENT)
Dept: MEDICAL GROUP | Facility: IMAGING CENTER | Age: 35
End: 2025-07-07
Payer: COMMERCIAL

## 2025-08-20 ENCOUNTER — OFFICE VISIT (OUTPATIENT)
Dept: MEDICAL GROUP | Facility: IMAGING CENTER | Age: 35
End: 2025-08-20
Payer: COMMERCIAL

## 2025-08-20 VITALS
DIASTOLIC BLOOD PRESSURE: 78 MMHG | WEIGHT: 264 LBS | HEART RATE: 96 BPM | SYSTOLIC BLOOD PRESSURE: 122 MMHG | BODY MASS INDEX: 36.96 KG/M2 | TEMPERATURE: 99.3 F | HEIGHT: 71 IN | OXYGEN SATURATION: 100 %

## 2025-08-20 DIAGNOSIS — Z13.0 SCREENING FOR DEFICIENCY ANEMIA: ICD-10-CM

## 2025-08-20 DIAGNOSIS — D72.828 NEUTROPHILIA: ICD-10-CM

## 2025-08-20 DIAGNOSIS — R23.9 SKIN CHANGE: ICD-10-CM

## 2025-08-20 DIAGNOSIS — Z13.29 SCREENING FOR THYROID DISORDER: ICD-10-CM

## 2025-08-20 DIAGNOSIS — Z13.1 DIABETES MELLITUS SCREENING: ICD-10-CM

## 2025-08-20 DIAGNOSIS — Z13.21 ENCOUNTER FOR VITAMIN DEFICIENCY SCREENING: ICD-10-CM

## 2025-08-20 DIAGNOSIS — K21.9 LARYNGOPHARYNGEAL REFLUX (LPR): ICD-10-CM

## 2025-08-20 DIAGNOSIS — Z13.220 SCREENING CHOLESTEROL LEVEL: ICD-10-CM

## 2025-08-20 DIAGNOSIS — Z88.9 H/O SEASONAL ALLERGIES: ICD-10-CM

## 2025-08-20 DIAGNOSIS — Z00.00 WELLNESS EXAMINATION: Primary | ICD-10-CM

## 2025-08-20 DIAGNOSIS — Z23 NEED FOR VACCINATION: ICD-10-CM

## 2025-08-20 DIAGNOSIS — G47.33 OSA (OBSTRUCTIVE SLEEP APNEA): ICD-10-CM

## 2025-08-20 DIAGNOSIS — R73.02 IGT (IMPAIRED GLUCOSE TOLERANCE): ICD-10-CM

## 2025-08-20 DIAGNOSIS — E78.5 DYSLIPIDEMIA: Chronic | ICD-10-CM

## 2025-08-20 DIAGNOSIS — E55.9 VITAMIN D DEFICIENCY: ICD-10-CM

## 2025-08-20 DIAGNOSIS — R25.2 FOOT SPASMS: ICD-10-CM

## 2025-08-20 DIAGNOSIS — R71.8 MICROCYTOSIS: ICD-10-CM

## 2025-08-20 DIAGNOSIS — Z11.4 SCREENING FOR HIV (HUMAN IMMUNODEFICIENCY VIRUS): ICD-10-CM

## 2025-08-20 PROBLEM — R06.83 SNORING: Status: RESOLVED | Noted: 2023-01-05 | Resolved: 2025-08-20

## 2025-08-20 PROCEDURE — 3074F SYST BP LT 130 MM HG: CPT | Performed by: PHYSICIAN ASSISTANT

## 2025-08-20 PROCEDURE — 99395 PREV VISIT EST AGE 18-39: CPT | Mod: 25 | Performed by: PHYSICIAN ASSISTANT

## 2025-08-20 PROCEDURE — 90651 9VHPV VACCINE 2/3 DOSE IM: CPT | Performed by: PHYSICIAN ASSISTANT

## 2025-08-20 PROCEDURE — 3078F DIAST BP <80 MM HG: CPT | Performed by: PHYSICIAN ASSISTANT

## 2025-08-20 PROCEDURE — 90471 IMMUNIZATION ADMIN: CPT | Performed by: PHYSICIAN ASSISTANT

## 2025-08-20 RX ORDER — TRIAMCINOLONE ACETONIDE 1 MG/G
OINTMENT TOPICAL
Qty: 30 G | Refills: 0 | Status: SHIPPED | OUTPATIENT
Start: 2025-08-20

## 2025-08-20 RX ORDER — CYCLOBENZAPRINE HCL 10 MG
10 TABLET ORAL 3 TIMES DAILY PRN
Qty: 30 TABLET | Refills: 0 | Status: SHIPPED | OUTPATIENT
Start: 2025-08-20

## 2025-08-20 SDOH — ECONOMIC STABILITY: FOOD INSECURITY: WITHIN THE PAST 12 MONTHS, THE FOOD YOU BOUGHT JUST DIDN'T LAST AND YOU DIDN'T HAVE MONEY TO GET MORE.: NEVER TRUE

## 2025-08-20 SDOH — HEALTH STABILITY: PHYSICAL HEALTH: ON AVERAGE, HOW MANY MINUTES DO YOU ENGAGE IN EXERCISE AT THIS LEVEL?: 20 MIN

## 2025-08-20 SDOH — HEALTH STABILITY: PHYSICAL HEALTH: ON AVERAGE, HOW MANY DAYS PER WEEK DO YOU ENGAGE IN MODERATE TO STRENUOUS EXERCISE (LIKE A BRISK WALK)?: 1 DAY

## 2025-08-20 SDOH — HEALTH STABILITY: MENTAL HEALTH

## 2025-08-20 SDOH — ECONOMIC STABILITY: INCOME INSECURITY: HOW HARD IS IT FOR YOU TO PAY FOR THE VERY BASICS LIKE FOOD, HOUSING, MEDICAL CARE, AND HEATING?: NOT HARD AT ALL

## 2025-08-20 SDOH — ECONOMIC STABILITY: FOOD INSECURITY: WITHIN THE PAST 12 MONTHS, YOU WORRIED THAT YOUR FOOD WOULD RUN OUT BEFORE YOU GOT MONEY TO BUY MORE.: NEVER TRUE

## 2025-08-20 SDOH — ECONOMIC STABILITY: INCOME INSECURITY: IN THE LAST 12 MONTHS, WAS THERE A TIME WHEN YOU WERE NOT ABLE TO PAY THE MORTGAGE OR RENT ON TIME?: NO

## 2025-08-20 ASSESSMENT — SOCIAL DETERMINANTS OF HEALTH (SDOH)
IN A TYPICAL WEEK, HOW MANY TIMES DO YOU TALK ON THE PHONE WITH FAMILY, FRIENDS, OR NEIGHBORS?: ONCE A WEEK
WITHIN THE PAST 12 MONTHS, YOU WORRIED THAT YOUR FOOD WOULD RUN OUT BEFORE YOU GOT THE MONEY TO BUY MORE: NEVER TRUE
IN THE PAST 12 MONTHS, HAS THE ELECTRIC, GAS, OIL, OR WATER COMPANY THREATENED TO SHUT OFF SERVICE IN YOUR HOME?: NO
HOW OFTEN DO YOU GET TOGETHER WITH FRIENDS OR RELATIVES?: PATIENT DECLINED
HOW OFTEN DO YOU ATTEND CHURCH OR RELIGIOUS SERVICES?: PATIENT DECLINED
DO YOU BELONG TO ANY CLUBS OR ORGANIZATIONS SUCH AS CHURCH GROUPS UNIONS, FRATERNAL OR ATHLETIC GROUPS, OR SCHOOL GROUPS?: YES
HOW OFTEN DO YOU ATTEND CHURCH OR RELIGIOUS SERVICES?: PATIENT DECLINED
HOW OFTEN DO YOU HAVE A DRINK CONTAINING ALCOHOL: MONTHLY OR LESS
HOW OFTEN DO YOU ATTENT MEETINGS OF THE CLUB OR ORGANIZATION YOU BELONG TO?: MORE THAN 4 TIMES PER YEAR
HOW OFTEN DO YOU HAVE SIX OR MORE DRINKS ON ONE OCCASION: NEVER
HOW HARD IS IT FOR YOU TO PAY FOR THE VERY BASICS LIKE FOOD, HOUSING, MEDICAL CARE, AND HEATING?: NOT HARD AT ALL
IN A TYPICAL WEEK, HOW MANY TIMES DO YOU TALK ON THE PHONE WITH FAMILY, FRIENDS, OR NEIGHBORS?: ONCE A WEEK
HOW OFTEN DO YOU GET TOGETHER WITH FRIENDS OR RELATIVES?: PATIENT DECLINED
HOW OFTEN DO YOU ATTENT MEETINGS OF THE CLUB OR ORGANIZATION YOU BELONG TO?: MORE THAN 4 TIMES PER YEAR
HOW MANY DRINKS CONTAINING ALCOHOL DO YOU HAVE ON A TYPICAL DAY WHEN YOU ARE DRINKING: 1 OR 2
DO YOU BELONG TO ANY CLUBS OR ORGANIZATIONS SUCH AS CHURCH GROUPS UNIONS, FRATERNAL OR ATHLETIC GROUPS, OR SCHOOL GROUPS?: YES

## 2025-08-20 ASSESSMENT — FIBROSIS 4 INDEX: FIB4 SCORE: 0.47

## 2025-08-20 ASSESSMENT — PATIENT HEALTH QUESTIONNAIRE - PHQ9: CLINICAL INTERPRETATION OF PHQ2 SCORE: 0

## 2025-08-20 ASSESSMENT — LIFESTYLE VARIABLES
HOW OFTEN DO YOU HAVE SIX OR MORE DRINKS ON ONE OCCASION: NEVER
SKIP TO QUESTIONS 9-10: 1
HOW MANY STANDARD DRINKS CONTAINING ALCOHOL DO YOU HAVE ON A TYPICAL DAY: 1 OR 2
AUDIT-C TOTAL SCORE: 1
HOW OFTEN DO YOU HAVE A DRINK CONTAINING ALCOHOL: MONTHLY OR LESS

## 2025-08-26 ENCOUNTER — APPOINTMENT (OUTPATIENT)
Dept: MEDICAL GROUP | Facility: IMAGING CENTER | Age: 35
End: 2025-08-26
Payer: COMMERCIAL